# Patient Record
Sex: MALE | Race: BLACK OR AFRICAN AMERICAN | NOT HISPANIC OR LATINO | Employment: UNEMPLOYED | URBAN - NONMETROPOLITAN AREA
[De-identification: names, ages, dates, MRNs, and addresses within clinical notes are randomized per-mention and may not be internally consistent; named-entity substitution may affect disease eponyms.]

---

## 2017-01-03 RX ORDER — TESTOSTERONE CYPIONATE 200 MG/ML
200 INJECTION, SOLUTION INTRAMUSCULAR
Qty: 2 ML | Refills: 5 | Status: SHIPPED | OUTPATIENT
Start: 2017-01-03 | End: 2017-02-16

## 2017-01-03 RX ORDER — LEVOTHYROXINE SODIUM 0.05 MG/1
50 TABLET ORAL DAILY
Qty: 30 TABLET | Refills: 5 | Status: SHIPPED | OUTPATIENT
Start: 2017-01-03 | End: 2017-05-01 | Stop reason: SDUPTHER

## 2017-01-03 RX ORDER — ERGOCALCIFEROL 1.25 MG/1
50000 CAPSULE ORAL 2 TIMES WEEKLY
Qty: 8 CAPSULE | Refills: 5 | Status: SHIPPED | OUTPATIENT
Start: 2017-01-05 | End: 2017-05-01 | Stop reason: SDUPTHER

## 2017-02-16 ENCOUNTER — OFFICE VISIT (OUTPATIENT)
Dept: FAMILY MEDICINE CLINIC | Facility: CLINIC | Age: 42
End: 2017-02-16

## 2017-02-16 VITALS
BODY MASS INDEX: 42.66 KG/M2 | SYSTOLIC BLOOD PRESSURE: 124 MMHG | WEIGHT: 315 LBS | HEIGHT: 72 IN | DIASTOLIC BLOOD PRESSURE: 80 MMHG

## 2017-02-16 DIAGNOSIS — R11.0 NAUSEA: Primary | ICD-10-CM

## 2017-02-16 DIAGNOSIS — I10 ESSENTIAL HYPERTENSION: ICD-10-CM

## 2017-02-16 DIAGNOSIS — F41.1 GENERALIZED ANXIETY DISORDER: ICD-10-CM

## 2017-02-16 DIAGNOSIS — E11.8 TYPE 2 DIABETES MELLITUS WITH COMPLICATION, WITHOUT LONG-TERM CURRENT USE OF INSULIN (HCC): Primary | ICD-10-CM

## 2017-02-16 PROCEDURE — 99214 OFFICE O/P EST MOD 30 MIN: CPT | Performed by: FAMILY MEDICINE

## 2017-02-16 RX ORDER — ALPRAZOLAM 2 MG/1
2 TABLET ORAL 2 TIMES DAILY
Qty: 60 TABLET | Refills: 2 | Status: SHIPPED | OUTPATIENT
Start: 2017-02-16 | End: 2017-06-07 | Stop reason: SDUPTHER

## 2017-02-16 NOTE — PROGRESS NOTES
Subjective   Kuldip Bossman Castaneda is a 41 y.o. male.   He is here today for follow-up on some abdominal pain issues in refills of medicines.  Continues to have a lot of anxiety.  He says that every night he worries that he would not even wake up the following morning.  He recently had a cardiac workup that was negative.  The cardiologist recommended he consider an EGD because of some upper epigastric bloating and occasional pain that he has  sometimes after meals.  He has history of cholecystectomy he just had a colonoscopy about 5 months ago which was normal.  He's had a CT of the abdomen and chest recently which were also normal.    He worries excessively.  He says every night he finds himself getting very stressed out and worried about his health overall and that something might be wrong with him.  History of Present Illness   Anxiety   Presents for follow-up visit. Symptoms include decreased concentration, depressed mood, excessive worry, irritability, muscle tension, nervous/anxious behavior, palpitations, panic and restlessness. Patient reports no chest pain, compulsions, confusion, dizziness, dry mouth, feeling of choking, hyperventilation, impotence, insomnia, malaise, nausea, obsessions, shortness of breath or suicidal ideas. Symptoms occur most days. The severity of symptoms is severe and causing significant distress. The quality of sleep is fair. Nighttime awakenings: occasional.     Compliance with medications is %.     The following portions of the patient's history were reviewed and updated as appropriate: allergies, current medications, past family history, past medical history, past social history, past surgical history and problem list.    Review of Systems   Constitutional: Negative for activity change, appetite change, diaphoresis, fatigue, fever and unexpected weight change.   HENT: Negative for congestion, ear pain, hearing loss, sinus pressure, sore throat, tinnitus, trouble swallowing and  voice change.    Eyes: Negative.    Respiratory: Negative.    Cardiovascular: Negative.    Gastrointestinal: Negative for abdominal distention, abdominal pain, blood in stool, constipation, diarrhea, nausea and vomiting.   Endocrine: Negative.    Genitourinary: Negative for decreased urine volume, dysuria, frequency, hematuria and urgency.   Musculoskeletal: Negative.    Skin: Negative.    Allergic/Immunologic: Negative.    Neurological: Negative for dizziness, tremors, seizures, syncope, speech difficulty, weakness, numbness and headaches.   Hematological: Negative.    Psychiatric/Behavioral: Negative for dysphoric mood and sleep disturbance. The patient is not nervous/anxious.        Objective   Physical Exam   Constitutional: He is oriented to person, place, and time. He appears well-developed and well-nourished. No distress.   HENT:   Head: Normocephalic and atraumatic.   Nose: Nose normal.   Mouth/Throat: Oropharynx is clear and moist.   Eyes: Conjunctivae and EOM are normal. Pupils are equal, round, and reactive to light.   Neck: Normal range of motion. Neck supple. No JVD present. No tracheal deviation present. No thyromegaly present.   Cardiovascular: Normal rate, regular rhythm, normal heart sounds and intact distal pulses.    No murmur heard.  Pulmonary/Chest: Effort normal and breath sounds normal. He has no wheezes. He exhibits no tenderness.   Abdominal: Soft. Bowel sounds are normal. He exhibits no distension and no mass. There is no tenderness.   Musculoskeletal: Normal range of motion. He exhibits no edema or tenderness.   Lymphadenopathy:     He has no cervical adenopathy.   Neurological: He is alert and oriented to person, place, and time. He exhibits normal muscle tone. Coordination normal.   Skin: Skin is warm and dry. No rash noted. No erythema. No pallor.   Psychiatric: He has a normal mood and affect.   Nursing note and vitals reviewed.      Assessment/Plan   Kuldip was seen today for med  refill.    Diagnoses and all orders for this visit:    Nausea  -     Ambulatory Referral to Gastroenterology    Generalized anxiety disorder    Essential hypertension    Other orders  -     ALPRAZolam (XANAX) 2 MG tablet; Take 1 tablet by mouth 2 (Two) Times a Day.    The patient has read and signed the [facility] Controlled Substance Contract.  I will continue to see patient for regular follow up appointments. Patient is well controlled on the medication.  MARY has been reviewed by me and is updated every 3 months. The patient is aware of the potential for addiction and dependence.   Continue with Xanax when needed for panic attacks and encouraged him to use this minimally and only when needed    Discussed with him today all the tests that he's had an reviewed some of the results.  There's been no sign or finding of a life-threatening illness.  I will refer him for EGD to work up the epigastric pain.  I do think some of his symptoms may be anxiety related.    He is going to be due for labs in the near future.  He reports that he is not testing blood sugars anymore because whenever he did that were running around 9200, and the nondiabetic range.  Last A1c is 6.4 and he's off medications for this    His blood pressures a been doing well, will continue lisinopril

## 2017-02-27 ENCOUNTER — LAB (OUTPATIENT)
Dept: LAB | Facility: OTHER | Age: 42
End: 2017-02-27

## 2017-02-27 DIAGNOSIS — R53.83 FATIGUE, UNSPECIFIED TYPE: ICD-10-CM

## 2017-02-27 DIAGNOSIS — I10 ESSENTIAL HYPERTENSION: ICD-10-CM

## 2017-02-27 DIAGNOSIS — E11.8 TYPE 2 DIABETES MELLITUS WITH COMPLICATION, WITHOUT LONG-TERM CURRENT USE OF INSULIN (HCC): ICD-10-CM

## 2017-02-27 DIAGNOSIS — E55.9 VITAMIN D DEFICIENCY: ICD-10-CM

## 2017-02-27 LAB
ALBUMIN SERPL-MCNC: 4.1 G/DL (ref 3.2–5.5)
ALBUMIN/GLOB SERPL: 1.3 G/DL (ref 1–3)
ALP SERPL-CCNC: 42 U/L (ref 15–121)
ALT SERPL W P-5'-P-CCNC: 21 U/L (ref 10–60)
ANION GAP SERPL CALCULATED.3IONS-SCNC: 8 MMOL/L (ref 5–15)
AST SERPL-CCNC: 24 U/L (ref 10–60)
BACTERIA UR QL AUTO: ABNORMAL /HPF
BASOPHILS # BLD AUTO: 0.02 10*3/MM3 (ref 0–0.2)
BASOPHILS NFR BLD AUTO: 0.5 % (ref 0–2)
BILIRUB SERPL-MCNC: 0.7 MG/DL (ref 0.2–1)
BILIRUB UR QL STRIP: NEGATIVE
BUN BLD-MCNC: 14 MG/DL (ref 8–25)
BUN/CREAT SERPL: 12.7 (ref 7–25)
CALCIUM SPEC-SCNC: 9.5 MG/DL (ref 8.4–10.8)
CHLORIDE SERPL-SCNC: 106 MMOL/L (ref 100–112)
CHOLEST SERPL-MCNC: 124 MG/DL (ref 150–200)
CLARITY UR: CLEAR
CO2 SERPL-SCNC: 24 MMOL/L (ref 20–32)
COLOR UR: YELLOW
CREAT BLD-MCNC: 1.1 MG/DL (ref 0.4–1.3)
DEPRECATED RDW RBC AUTO: 44.8 FL (ref 35.1–43.9)
EOSINOPHIL # BLD AUTO: 0.29 10*3/MM3 (ref 0–0.7)
EOSINOPHIL NFR BLD AUTO: 7.2 % (ref 0–7)
ERYTHROCYTE [DISTWIDTH] IN BLOOD BY AUTOMATED COUNT: 14.8 % (ref 11.5–14.5)
GFR SERPL CREATININE-BSD FRML MDRD: 89 ML/MIN/1.73 (ref 63–147)
GLOBULIN UR ELPH-MCNC: 3.2 GM/DL (ref 2.5–4.6)
GLUCOSE BLD-MCNC: 112 MG/DL (ref 70–100)
GLUCOSE UR STRIP-MCNC: NEGATIVE MG/DL
HCT VFR BLD AUTO: 39.3 % (ref 39–49)
HDLC SERPL-MCNC: 31 MG/DL (ref 35–100)
HGB BLD-MCNC: 13.6 G/DL (ref 13.7–17.3)
HGB UR QL STRIP.AUTO: ABNORMAL
HYALINE CASTS UR QL AUTO: ABNORMAL /LPF
KETONES UR QL STRIP: NEGATIVE
LDLC SERPL CALC-MCNC: 81 MG/DL
LDLC/HDLC SERPL: 2.62 {RATIO}
LEUKOCYTE ESTERASE UR QL STRIP.AUTO: NEGATIVE
LYMPHOCYTES # BLD AUTO: 1.76 10*3/MM3 (ref 0.6–4.2)
LYMPHOCYTES NFR BLD AUTO: 43.7 % (ref 10–50)
MCH RBC QN AUTO: 29.1 PG (ref 26.5–34)
MCHC RBC AUTO-ENTMCNC: 34.6 G/DL (ref 31.5–36.3)
MCV RBC AUTO: 84.2 FL (ref 80–98)
MONOCYTES # BLD AUTO: 0.37 10*3/MM3 (ref 0–0.9)
MONOCYTES NFR BLD AUTO: 9.2 % (ref 0–12)
NEUTROPHILS # BLD AUTO: 1.59 10*3/MM3 (ref 2–8.6)
NEUTROPHILS NFR BLD AUTO: 39.4 % (ref 37–80)
NITRITE UR QL STRIP: NEGATIVE
PH UR STRIP.AUTO: 5.5 [PH] (ref 5.5–8)
PLATELET # BLD AUTO: 256 10*3/MM3 (ref 150–450)
PMV BLD AUTO: 9 FL (ref 8–12)
POTASSIUM BLD-SCNC: 4.1 MMOL/L (ref 3.4–5.4)
PROT SERPL-MCNC: 7.3 G/DL (ref 6.7–8.2)
PROT UR QL STRIP: NEGATIVE
RBC # BLD AUTO: 4.67 10*6/MM3 (ref 4.37–5.74)
RBC # UR: ABNORMAL /HPF
REF LAB TEST METHOD: ABNORMAL
SODIUM BLD-SCNC: 138 MMOL/L (ref 134–146)
SP GR UR STRIP: 1.02 (ref 1–1.03)
SQUAMOUS #/AREA URNS HPF: ABNORMAL /HPF
TRIGL SERPL-MCNC: 59 MG/DL (ref 35–160)
UROBILINOGEN UR QL STRIP: ABNORMAL
VLDLC SERPL-MCNC: 11.8 MG/DL
WBC NRBC COR # BLD: 4.03 10*3/MM3 (ref 3.2–9.8)
WBC UR QL AUTO: ABNORMAL /HPF

## 2017-02-27 PROCEDURE — 83036 HEMOGLOBIN GLYCOSYLATED A1C: CPT | Performed by: FAMILY MEDICINE

## 2017-02-27 PROCEDURE — 80053 COMPREHEN METABOLIC PANEL: CPT | Performed by: NURSE PRACTITIONER

## 2017-02-27 PROCEDURE — 36415 COLL VENOUS BLD VENIPUNCTURE: CPT | Performed by: NURSE PRACTITIONER

## 2017-02-27 PROCEDURE — 84443 ASSAY THYROID STIM HORMONE: CPT | Performed by: NURSE PRACTITIONER

## 2017-02-27 PROCEDURE — 80061 LIPID PANEL: CPT | Performed by: FAMILY MEDICINE

## 2017-02-27 PROCEDURE — 81001 URINALYSIS AUTO W/SCOPE: CPT | Performed by: NURSE PRACTITIONER

## 2017-02-27 PROCEDURE — 82607 VITAMIN B-12: CPT | Performed by: NURSE PRACTITIONER

## 2017-02-27 PROCEDURE — 84402 ASSAY OF FREE TESTOSTERONE: CPT | Performed by: NURSE PRACTITIONER

## 2017-02-27 PROCEDURE — 85025 COMPLETE CBC W/AUTO DIFF WBC: CPT | Performed by: NURSE PRACTITIONER

## 2017-02-27 PROCEDURE — 84403 ASSAY OF TOTAL TESTOSTERONE: CPT | Performed by: NURSE PRACTITIONER

## 2017-02-27 PROCEDURE — 82306 VITAMIN D 25 HYDROXY: CPT | Performed by: NURSE PRACTITIONER

## 2017-02-28 LAB
25(OH)D3 SERPL-MCNC: 39.9 NG/ML (ref 30–100)
HBA1C MFR BLD: 6.78 % (ref 4–5.6)
TSH SERPL DL<=0.05 MIU/L-ACNC: 2.43 MIU/ML (ref 0.46–4.68)
VIT B12 BLD-MCNC: 842 PG/ML (ref 239–931)

## 2017-03-02 LAB
CONV COMMENT: ABNORMAL
TESTOST FREE SERPL-MCNC: 11.6 PG/ML (ref 6.8–21.5)
TESTOST SERPL-MCNC: 328 NG/DL (ref 348–1197)

## 2017-03-06 ENCOUNTER — OFFICE VISIT (OUTPATIENT)
Dept: OPHTHALMOLOGY | Facility: CLINIC | Age: 42
End: 2017-03-06

## 2017-03-06 DIAGNOSIS — E11.9 DIABETES MELLITUS WITHOUT COMPLICATION (HCC): ICD-10-CM

## 2017-03-06 DIAGNOSIS — H40.003 BORDERLINE GLAUCOMA, BILATERAL: Primary | ICD-10-CM

## 2017-03-06 PROCEDURE — 92014 COMPRE OPH EXAM EST PT 1/>: CPT | Performed by: OPHTHALMOLOGY

## 2017-03-06 PROCEDURE — 92133 CPTRZD OPH DX IMG PST SGM ON: CPT | Performed by: OPHTHALMOLOGY

## 2017-03-06 PROCEDURE — 76514 ECHO EXAM OF EYE THICKNESS: CPT | Performed by: OPHTHALMOLOGY

## 2017-03-06 NOTE — PROGRESS NOTES
Subjective   Kuldip Castaneda is a 41 y.o. male.   Chief Complaint   Patient presents with   • Diabetic Eye Exam   • borderline glaucoma     HPI     Diabetic Eye Exam   Associated symptoms: Negative for blurred vision   Diabetes Type: Type 2 and controlled with diet   Duration: years   Blood Sugars: is controlled       Last edited by Christos Richardson MD on 3/6/2017 11:33 AM. (History)          Review of Systems    Objective   Visual Acuity (Snellen - Linear)      Right Left   Dist sc 20/30 +2 20/25   Near sc J1+ J1+                  Pupils      Pupils   Right PERRL   Left PERRL           Confrontational Visual Fields     Visual Fields      Left Right   Result Full Full                  Extraocular Movement      Right Left   Result Full Full              Tonometry (Applanation, 11:35 AM)      Right Left   Pressure 19 19              Main Ophthalmology Exam     External Exam      Right Left    External Normal Normal      Slit Lamp Exam      Right Left    Lids/Lashes Normal Normal    Conjunctiva/Sclera White and quiet White and quiet    Cornea Clear Clear    Anterior Chamber Deep and quiet Deep and quiet    Iris Round and reactive Round and reactive    Lens Clear Clear    Vitreous Normal Normal      Fundus Exam      Right Left    Disc Thin rim 0.15 ST, IT Thin rim 0.15 St, IT    Macula Normal Normal    Vessels Normal Normal    Periphery Normal Normal            OCT Disc:   OD- relative superior thinning and not progressed  OS- relative superior thinning and not progressed    Assessment/Plan   Diagnoses and all orders for this visit:    Borderline glaucoma, bilateral    Diabetes mellitus without complication    Eye disease risks with diabetes discussed         Return in about 1 year (around 3/6/2018) for Visual Field 24-2.

## 2017-03-13 ENCOUNTER — OFFICE VISIT (OUTPATIENT)
Dept: FAMILY MEDICINE CLINIC | Facility: CLINIC | Age: 42
End: 2017-03-13

## 2017-03-13 VITALS
BODY MASS INDEX: 42.66 KG/M2 | SYSTOLIC BLOOD PRESSURE: 162 MMHG | HEIGHT: 72 IN | WEIGHT: 315 LBS | DIASTOLIC BLOOD PRESSURE: 122 MMHG | HEART RATE: 65 BPM

## 2017-03-13 DIAGNOSIS — J06.9 ACUTE URI: Primary | ICD-10-CM

## 2017-03-13 PROCEDURE — 99213 OFFICE O/P EST LOW 20 MIN: CPT | Performed by: FAMILY MEDICINE

## 2017-03-13 RX ORDER — AZITHROMYCIN 250 MG/1
TABLET, FILM COATED ORAL
Qty: 6 TABLET | Refills: 0 | Status: SHIPPED | OUTPATIENT
Start: 2017-03-13 | End: 2017-03-18

## 2017-03-13 RX ORDER — AZITHROMYCIN 250 MG/1
TABLET, FILM COATED ORAL
Qty: 6 TABLET | Refills: 0 | Status: SHIPPED | OUTPATIENT
Start: 2017-03-13 | End: 2017-03-13 | Stop reason: SDUPTHER

## 2017-03-13 NOTE — PROGRESS NOTES
Subjective   Kuldip Castaneda is a 41 y.o. male.  He's here today with a weeklong history of productive cough congestion and drainage.    History of Present Illness   URI    This is a new problem. The current episode started in the past 7 days. The problem has been unchanged. There has been no fever. Associated symptoms include congestion, coughing, ear pain, headaches, a plugged ear sensation, rhinorrhea, sinus pain, sneezing and a sore throat. Pertinent negatives include no abdominal pain, chest pain, diarrhea, dysuria, joint pain, joint swelling, nausea, neck pain, rash, vomiting or wheezing.  he has tried, antihistamine and acetaminophen for the symptoms. The treatment provided mild relief.       The following portions of the patient's history were reviewed and updated as appropriate: allergies, current medications, past family history, past medical history, past social history, past surgical history and problem list.    Review of Systems  Review of Systems   Constitutional: Negative.    HENT: Positive for congestion, ear pain, rhinorrhea, sneezing and sore throat.    Respiratory: Positive for cough. Negative for shortness of breath and wheezing.    Cardiovascular: Negative.  Negative for chest pain.   Gastrointestinal: Negative.  Negative for abdominal pain, diarrhea, nausea and vomiting.   Genitourinary: Negative for dysuria.   Musculoskeletal: Negative.  Negative for joint pain, myalgias and neck pain.   Skin: Negative.  Negative for rash.   Allergic/Immunologic: Negative for immunocompromised state.   Neurological: Positive for headaches. Negative for dizziness, tremors, seizures, syncope, weakness and numbness.   Hematological: Negative.    Psychiatric/Behavioral: Negative for agitation, confusion, dysphoric mood and sleep disturbance. The patient is not nervous/anxious.      Objective   Physical Exam  Physical Exam   Constitutional: She is oriented to person, place, and time. She appears well-developed  and well-nourished.   HENT:   Head: Normocephalic and atraumatic.   Nose: Nose normal.   Mouth/Throat: Oropharynx is clear and moist.   Posterior pharynx shows lymphoid hyperplasia and mild erythema.  Tympanic membranes are retracted bilaterally.  There is mild tenderness over the maxillary sinuses bilaterally  tonsils are enlarged bilaterally  Eyes: Conjunctivae and EOM are normal. Pupils are equal, round, and reactive to light.   Neck: Normal range of motion. Neck supple. No JVD present. No tracheal deviation present. No thyromegaly present.   Cardiovascular: Normal rate, regular rhythm, normal heart sounds and intact distal pulses.    No murmur heard.  Pulmonary/Chest: Effort normal and breath sounds normal. She has no wheezes.   Abdominal: Soft. Bowel sounds are normal. She exhibits no distension. There is no tenderness.   Musculoskeletal: Normal range of motion. She exhibits no edema.   Lymphadenopathy:     She has no cervical adenopathy.   Neurological: She is alert and oriented to person, place, and time. Coordination normal.   Skin: Skin is warm and dry. No rash noted.   Psychiatric: She has a normal mood and affect.   Nursing note and vitals reviewed.    Assessment/Plan   Kuldip was seen today for cough.    Diagnoses and all orders for this visit:    Acute URI    Other orders  -     Discontinue: azithromycin (ZITHROMAX) 250 MG tablet; Take 2 tablets by mouth the first day, then 1 tablet daily for the next 4 days.  -     azithromycin (ZITHROMAX) 250 MG tablet; Take 2 tablets by mouth the first day, then 1 tablet daily for the next 4 days.   Zithromax is given for upper respiratory infection symptoms.  He is advised to use Mucinex, discontinue antihistamines for now.  Contact me if not improving within a week    Regarding the enlarged tonsils and snoring, once the infection clears he may want to consider sleep study or ENT referral.  Discussed sleep apnea with patient and his wife today.  They will consider  referral and let me know

## 2017-04-25 ENCOUNTER — OFFICE VISIT (OUTPATIENT)
Dept: GASTROENTEROLOGY | Facility: CLINIC | Age: 42
End: 2017-04-25

## 2017-04-25 VITALS
DIASTOLIC BLOOD PRESSURE: 82 MMHG | SYSTOLIC BLOOD PRESSURE: 130 MMHG | WEIGHT: 315 LBS | HEART RATE: 66 BPM | HEIGHT: 72 IN | BODY MASS INDEX: 42.66 KG/M2

## 2017-04-25 DIAGNOSIS — R10.13 EPIGASTRIC PAIN: Primary | ICD-10-CM

## 2017-04-25 PROCEDURE — 99214 OFFICE O/P EST MOD 30 MIN: CPT | Performed by: INTERNAL MEDICINE

## 2017-04-25 RX ORDER — DEXTROSE AND SODIUM CHLORIDE 5; .45 G/100ML; G/100ML
30 INJECTION, SOLUTION INTRAVENOUS CONTINUOUS PRN
Status: CANCELLED | OUTPATIENT
Start: 2017-05-24

## 2017-04-25 RX ORDER — CYANOCOBALAMIN 1000 UG/ML
INJECTION, SOLUTION INTRAMUSCULAR; SUBCUTANEOUS
Qty: 2 ML | Refills: 3 | Status: SHIPPED | OUTPATIENT
Start: 2017-04-25 | End: 2017-08-16

## 2017-04-25 RX ORDER — SODIUM CHLORIDE 0.9 % (FLUSH) 0.9 %
1-10 SYRINGE (ML) INJECTION AS NEEDED
Status: CANCELLED | OUTPATIENT
Start: 2017-05-24
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
## 2017-04-26 ENCOUNTER — APPOINTMENT (OUTPATIENT)
Dept: GENERAL RADIOLOGY | Facility: HOSPITAL | Age: 42
End: 2017-04-26

## 2017-04-26 ENCOUNTER — HOSPITAL ENCOUNTER (EMERGENCY)
Facility: HOSPITAL | Age: 42
Discharge: HOME | End: 2017-04-26
Attending: EMERGENCY MEDICINE | Admitting: EMERGENCY MEDICINE

## 2017-04-26 VITALS
DIASTOLIC BLOOD PRESSURE: 107 MMHG | RESPIRATION RATE: 18 BRPM | SYSTOLIC BLOOD PRESSURE: 163 MMHG | OXYGEN SATURATION: 100 % | TEMPERATURE: 98 F | WEIGHT: 308.25 LBS | HEIGHT: 72 IN | HEART RATE: 53 BPM | BODY MASS INDEX: 41.75 KG/M2

## 2017-04-26 DIAGNOSIS — S39.012A LUMBAR STRAIN, INITIAL ENCOUNTER: Primary | ICD-10-CM

## 2017-04-26 DIAGNOSIS — M51.36 DEGENERATIVE DISC DISEASE, LUMBAR: ICD-10-CM

## 2017-04-26 DIAGNOSIS — I10 HYPERTENSION, UNCONTROLLED: ICD-10-CM

## 2017-04-26 LAB
ALBUMIN SERPL-MCNC: 4.3 G/DL (ref 3.4–4.8)
ALBUMIN/GLOB SERPL: 1.4 G/DL (ref 1.1–1.8)
ALP SERPL-CCNC: 49 U/L (ref 38–126)
ALT SERPL W P-5'-P-CCNC: 27 U/L (ref 21–72)
ANION GAP SERPL CALCULATED.3IONS-SCNC: 12 MMOL/L (ref 5–15)
AST SERPL-CCNC: 30 U/L (ref 17–59)
BASOPHILS # BLD AUTO: 0.02 10*3/MM3 (ref 0–0.2)
BASOPHILS NFR BLD AUTO: 0.4 % (ref 0–2)
BILIRUB SERPL-MCNC: 0.5 MG/DL (ref 0.2–1.3)
BILIRUB UR QL STRIP: NEGATIVE
BUN BLD-MCNC: 14 MG/DL (ref 7–21)
BUN/CREAT SERPL: 13.1 (ref 7–25)
CALCIUM SPEC-SCNC: 9.2 MG/DL (ref 8.4–10.2)
CHLORIDE SERPL-SCNC: 99 MMOL/L (ref 95–110)
CLARITY UR: CLEAR
CO2 SERPL-SCNC: 26 MMOL/L (ref 22–31)
COLOR UR: YELLOW
CREAT BLD-MCNC: 1.07 MG/DL (ref 0.7–1.3)
DEPRECATED RDW RBC AUTO: 44.4 FL (ref 35.1–43.9)
EOSINOPHIL # BLD AUTO: 0.31 10*3/MM3 (ref 0–0.7)
EOSINOPHIL NFR BLD AUTO: 6.7 % (ref 0–7)
ERYTHROCYTE [DISTWIDTH] IN BLOOD BY AUTOMATED COUNT: 14.5 % (ref 11.5–14.5)
GFR SERPL CREATININE-BSD FRML MDRD: 92 ML/MIN/1.73 (ref 63–147)
GLOBULIN UR ELPH-MCNC: 3 GM/DL (ref 2.3–3.5)
GLUCOSE BLD-MCNC: 83 MG/DL (ref 60–100)
GLUCOSE UR STRIP-MCNC: NEGATIVE MG/DL
HCT VFR BLD AUTO: 40.9 % (ref 39–49)
HGB BLD-MCNC: 14.3 G/DL (ref 13.7–17.3)
HGB UR QL STRIP.AUTO: NEGATIVE
HOLD SPECIMEN: NORMAL
HOLD SPECIMEN: NORMAL
IMM GRANULOCYTES # BLD: 0.01 10*3/MM3 (ref 0–0.02)
IMM GRANULOCYTES NFR BLD: 0.2 % (ref 0–0.5)
KETONES UR QL STRIP: NEGATIVE
LEUKOCYTE ESTERASE UR QL STRIP.AUTO: NEGATIVE
LYMPHOCYTES # BLD AUTO: 2.57 10*3/MM3 (ref 0.6–4.2)
LYMPHOCYTES NFR BLD AUTO: 55.9 % (ref 10–50)
MCH RBC QN AUTO: 29.2 PG (ref 26.5–34)
MCHC RBC AUTO-ENTMCNC: 35 G/DL (ref 31.5–36.3)
MCV RBC AUTO: 83.5 FL (ref 80–98)
MONOCYTES # BLD AUTO: 0.37 10*3/MM3 (ref 0–0.9)
MONOCYTES NFR BLD AUTO: 8 % (ref 0–12)
NEUTROPHILS # BLD AUTO: 1.32 10*3/MM3 (ref 2–8.6)
NEUTROPHILS NFR BLD AUTO: 28.8 % (ref 37–80)
NITRITE UR QL STRIP: NEGATIVE
PH UR STRIP.AUTO: 6 [PH] (ref 5–9)
PLATELET # BLD AUTO: 246 10*3/MM3 (ref 150–450)
PMV BLD AUTO: 9.1 FL (ref 8–12)
POTASSIUM BLD-SCNC: 3.6 MMOL/L (ref 3.5–5.1)
PROT SERPL-MCNC: 7.3 G/DL (ref 6.3–8.6)
PROT UR QL STRIP: NEGATIVE
RBC # BLD AUTO: 4.9 10*6/MM3 (ref 4.37–5.74)
SODIUM BLD-SCNC: 137 MMOL/L (ref 137–145)
SP GR UR STRIP: 1.02 (ref 1–1.03)
UROBILINOGEN UR QL STRIP: NORMAL
WBC NRBC COR # BLD: 4.6 10*3/MM3 (ref 3.2–9.8)
WHOLE BLOOD HOLD SPECIMEN: NORMAL
WHOLE BLOOD HOLD SPECIMEN: NORMAL

## 2017-04-26 PROCEDURE — 96374 THER/PROPH/DIAG INJ IV PUSH: CPT

## 2017-04-26 PROCEDURE — 25010000002 KETOROLAC TROMETHAMINE PER 15 MG: Performed by: EMERGENCY MEDICINE

## 2017-04-26 PROCEDURE — 85025 COMPLETE CBC W/AUTO DIFF WBC: CPT | Performed by: EMERGENCY MEDICINE

## 2017-04-26 PROCEDURE — 81003 URINALYSIS AUTO W/O SCOPE: CPT | Performed by: EMERGENCY MEDICINE

## 2017-04-26 PROCEDURE — 80053 COMPREHEN METABOLIC PANEL: CPT | Performed by: EMERGENCY MEDICINE

## 2017-04-26 PROCEDURE — 72100 X-RAY EXAM L-S SPINE 2/3 VWS: CPT

## 2017-04-26 PROCEDURE — 99284 EMERGENCY DEPT VISIT MOD MDM: CPT

## 2017-04-26 RX ORDER — KETOROLAC TROMETHAMINE 30 MG/ML
60 INJECTION, SOLUTION INTRAMUSCULAR; INTRAVENOUS ONCE
Status: DISCONTINUED | OUTPATIENT
Start: 2017-04-26 | End: 2017-04-26

## 2017-04-26 RX ORDER — METHOCARBAMOL 750 MG/1
750 TABLET, FILM COATED ORAL 4 TIMES DAILY PRN
Qty: 30 TABLET | Refills: 0 | Status: SHIPPED | OUTPATIENT
Start: 2017-04-26 | End: 2017-06-07

## 2017-04-26 RX ORDER — SODIUM CHLORIDE 0.9 % (FLUSH) 0.9 %
10 SYRINGE (ML) INJECTION AS NEEDED
Status: DISCONTINUED | OUTPATIENT
Start: 2017-04-26 | End: 2017-04-26 | Stop reason: HOSPADM

## 2017-04-26 RX ORDER — KETOROLAC TROMETHAMINE 30 MG/ML
30 INJECTION, SOLUTION INTRAMUSCULAR; INTRAVENOUS ONCE
Status: COMPLETED | OUTPATIENT
Start: 2017-04-26 | End: 2017-04-26

## 2017-04-26 RX ORDER — CLONIDINE HYDROCHLORIDE 0.2 MG/1
0.2 TABLET ORAL ONCE
Status: COMPLETED | OUTPATIENT
Start: 2017-04-26 | End: 2017-04-26

## 2017-04-26 RX ADMIN — CLONIDINE HYDROCHLORIDE 0.2 MG: 0.2 TABLET ORAL at 21:12

## 2017-04-26 RX ADMIN — Medication 10 ML: at 20:12

## 2017-04-26 RX ADMIN — KETOROLAC TROMETHAMINE 30 MG: 30 INJECTION, SOLUTION INTRAMUSCULAR; INTRAVENOUS at 20:11

## 2017-05-01 ENCOUNTER — OFFICE VISIT (OUTPATIENT)
Dept: FAMILY MEDICINE CLINIC | Facility: CLINIC | Age: 42
End: 2017-05-01

## 2017-05-01 VITALS
WEIGHT: 306 LBS | SYSTOLIC BLOOD PRESSURE: 130 MMHG | DIASTOLIC BLOOD PRESSURE: 110 MMHG | BODY MASS INDEX: 41.45 KG/M2 | HEART RATE: 73 BPM | HEIGHT: 72 IN | OXYGEN SATURATION: 97 % | TEMPERATURE: 96 F

## 2017-05-01 DIAGNOSIS — M51.37 DDD (DEGENERATIVE DISC DISEASE), LUMBOSACRAL: Primary | Chronic | ICD-10-CM

## 2017-05-01 DIAGNOSIS — D17.0 LIPOMA OF NECK: ICD-10-CM

## 2017-05-01 PROCEDURE — 99214 OFFICE O/P EST MOD 30 MIN: CPT | Performed by: NURSE PRACTITIONER

## 2017-05-03 ENCOUNTER — HOSPITAL ENCOUNTER (OUTPATIENT)
Dept: CT IMAGING | Facility: HOSPITAL | Age: 42
Discharge: HOME | End: 2017-05-03
Attending: INTERNAL MEDICINE | Admitting: INTERNAL MEDICINE

## 2017-05-03 DIAGNOSIS — R10.13 EPIGASTRIC PAIN: ICD-10-CM

## 2017-05-03 PROCEDURE — 0 IOPAMIDOL 61 % SOLUTION: Performed by: INTERNAL MEDICINE

## 2017-05-03 PROCEDURE — 74177 CT ABD & PELVIS W/CONTRAST: CPT

## 2017-05-03 RX ADMIN — IOPAMIDOL 94 ML: 612 INJECTION, SOLUTION INTRAVENOUS at 11:30

## 2017-05-04 PROBLEM — D17.0 LIPOMA OF NECK: Status: ACTIVE | Noted: 2017-05-04

## 2017-05-04 PROBLEM — M51.37 DDD (DEGENERATIVE DISC DISEASE), LUMBOSACRAL: Chronic | Status: ACTIVE | Noted: 2017-05-04

## 2017-05-04 RX ORDER — METHYLPREDNISOLONE 4 MG/1
TABLET ORAL
Qty: 21 TABLET | Refills: 0 | Status: SHIPPED | OUTPATIENT
Start: 2017-05-04 | End: 2017-05-17

## 2017-05-04 RX ORDER — TIZANIDINE 4 MG/1
4 TABLET ORAL NIGHTLY PRN
Qty: 30 TABLET | Refills: 0 | Status: SHIPPED | OUTPATIENT
Start: 2017-05-04 | End: 2017-05-17 | Stop reason: SDDI

## 2017-05-04 RX ORDER — LIDOCAINE 50 MG/G
OINTMENT TOPICAL
Qty: 50 G | Refills: 0 | Status: SHIPPED | OUTPATIENT
Start: 2017-05-04 | End: 2017-05-17 | Stop reason: SDDI

## 2017-05-08 ENCOUNTER — OFFICE VISIT (OUTPATIENT)
Dept: ENDOCRINOLOGY | Facility: CLINIC | Age: 42
End: 2017-05-08

## 2017-05-08 VITALS
SYSTOLIC BLOOD PRESSURE: 140 MMHG | DIASTOLIC BLOOD PRESSURE: 84 MMHG | HEIGHT: 70 IN | HEART RATE: 72 BPM | WEIGHT: 297.2 LBS | BODY MASS INDEX: 42.55 KG/M2

## 2017-05-08 DIAGNOSIS — E03.8 HYPOTHYROIDISM DUE TO HASHIMOTO'S THYROIDITIS: ICD-10-CM

## 2017-05-08 DIAGNOSIS — E78.2 MIXED HYPERLIPIDEMIA: ICD-10-CM

## 2017-05-08 DIAGNOSIS — I10 ESSENTIAL HYPERTENSION: ICD-10-CM

## 2017-05-08 DIAGNOSIS — E55.9 VITAMIN D DEFICIENCY: ICD-10-CM

## 2017-05-08 DIAGNOSIS — E11.9 TYPE 2 DIABETES MELLITUS WITHOUT COMPLICATION, WITHOUT LONG-TERM CURRENT USE OF INSULIN (HCC): Primary | ICD-10-CM

## 2017-05-08 DIAGNOSIS — E29.1 DEFICIENCY OF TESTOSTERONE BIOSYNTHESIS: Chronic | ICD-10-CM

## 2017-05-08 DIAGNOSIS — E53.8 B12 DEFICIENCY: ICD-10-CM

## 2017-05-08 DIAGNOSIS — E06.3 HYPOTHYROIDISM DUE TO HASHIMOTO'S THYROIDITIS: ICD-10-CM

## 2017-05-08 PROCEDURE — 99214 OFFICE O/P EST MOD 30 MIN: CPT | Performed by: INTERNAL MEDICINE

## 2017-05-08 RX ORDER — TESTOSTERONE CYPIONATE 200 MG/ML
INJECTION, SOLUTION INTRAMUSCULAR
Qty: 3 ML | Refills: 5 | Status: SHIPPED | OUTPATIENT
Start: 2017-05-08 | End: 2017-10-10 | Stop reason: SDUPTHER

## 2017-05-08 RX ORDER — SULFAMETHOXAZOLE AND TRIMETHOPRIM 800; 160 MG/1; MG/1
1 TABLET ORAL 2 TIMES DAILY
Qty: 6 TABLET | Refills: 0 | Status: SHIPPED | OUTPATIENT
Start: 2017-05-08 | End: 2017-05-08 | Stop reason: SDUPTHER

## 2017-05-08 RX ORDER — SULFAMETHOXAZOLE AND TRIMETHOPRIM 800; 160 MG/1; MG/1
1 TABLET ORAL 2 TIMES DAILY
Qty: 6 TABLET | Refills: 0 | Status: SHIPPED | OUTPATIENT
Start: 2017-05-08 | End: 2017-05-11

## 2017-05-12 ENCOUNTER — HOSPITAL ENCOUNTER (OUTPATIENT)
Dept: MRI IMAGING | Facility: HOSPITAL | Age: 42
Discharge: HOME | End: 2017-05-12
Admitting: NURSE PRACTITIONER

## 2017-05-12 PROCEDURE — 72148 MRI LUMBAR SPINE W/O DYE: CPT

## 2017-05-15 DIAGNOSIS — R93.7 ABNORMAL MRI, LUMBAR SPINE: Primary | ICD-10-CM

## 2017-05-17 ENCOUNTER — CONSULT (OUTPATIENT)
Dept: PHYSICAL THERAPY | Facility: CLINIC | Age: 42
End: 2017-05-17

## 2017-05-17 DIAGNOSIS — R93.7 ABNORMAL MRI, LUMBAR SPINE: Primary | ICD-10-CM

## 2017-05-17 PROCEDURE — 97162 PT EVAL MOD COMPLEX 30 MIN: CPT | Performed by: PHYSICAL THERAPIST

## 2017-05-24 ENCOUNTER — ANESTHESIA EVENT (OUTPATIENT)
Dept: GASTROENTEROLOGY | Facility: HOSPITAL | Age: 42
End: 2017-05-24

## 2017-05-24 ENCOUNTER — ANESTHESIA (OUTPATIENT)
Dept: GASTROENTEROLOGY | Facility: HOSPITAL | Age: 42
End: 2017-05-24

## 2017-05-24 ENCOUNTER — HOSPITAL ENCOUNTER (OUTPATIENT)
Facility: HOSPITAL | Age: 42
Setting detail: HOSPITAL OUTPATIENT SURGERY
Discharge: HOME | End: 2017-05-24
Attending: INTERNAL MEDICINE | Admitting: INTERNAL MEDICINE

## 2017-05-24 VITALS
OXYGEN SATURATION: 97 % | SYSTOLIC BLOOD PRESSURE: 161 MMHG | HEART RATE: 59 BPM | BODY MASS INDEX: 39.15 KG/M2 | DIASTOLIC BLOOD PRESSURE: 78 MMHG | TEMPERATURE: 97 F | WEIGHT: 289.02 LBS | HEIGHT: 72 IN | RESPIRATION RATE: 18 BRPM

## 2017-05-24 DIAGNOSIS — R10.13 EPIGASTRIC PAIN: ICD-10-CM

## 2017-05-24 PROCEDURE — 88313 SPECIAL STAINS GROUP 2: CPT | Performed by: INTERNAL MEDICINE

## 2017-05-24 PROCEDURE — 88313 SPECIAL STAINS GROUP 2: CPT | Performed by: PATHOLOGY

## 2017-05-24 PROCEDURE — 43239 EGD BIOPSY SINGLE/MULTIPLE: CPT | Performed by: INTERNAL MEDICINE

## 2017-05-24 PROCEDURE — 25010000002 PROPOFOL 10 MG/ML EMULSION: Performed by: NURSE ANESTHETIST, CERTIFIED REGISTERED

## 2017-05-24 PROCEDURE — 88305 TISSUE EXAM BY PATHOLOGIST: CPT | Performed by: PATHOLOGY

## 2017-05-24 PROCEDURE — 88305 TISSUE EXAM BY PATHOLOGIST: CPT | Performed by: INTERNAL MEDICINE

## 2017-05-24 RX ORDER — LIDOCAINE HYDROCHLORIDE 10 MG/ML
INJECTION, SOLUTION INFILTRATION; PERINEURAL AS NEEDED
Status: DISCONTINUED | OUTPATIENT
Start: 2017-05-24 | End: 2017-05-24 | Stop reason: SURG

## 2017-05-24 RX ORDER — ONDANSETRON 2 MG/ML
4 INJECTION INTRAMUSCULAR; INTRAVENOUS ONCE AS NEEDED
Status: DISCONTINUED | OUTPATIENT
Start: 2017-05-24 | End: 2017-05-24 | Stop reason: HOSPADM

## 2017-05-24 RX ORDER — PROMETHAZINE HYDROCHLORIDE 25 MG/1
25 SUPPOSITORY RECTAL ONCE AS NEEDED
Status: DISCONTINUED | OUTPATIENT
Start: 2017-05-24 | End: 2017-05-24 | Stop reason: HOSPADM

## 2017-05-24 RX ORDER — PROMETHAZINE HYDROCHLORIDE 25 MG/1
25 TABLET ORAL ONCE AS NEEDED
Status: DISCONTINUED | OUTPATIENT
Start: 2017-05-24 | End: 2017-05-24 | Stop reason: HOSPADM

## 2017-05-24 RX ORDER — DEXAMETHASONE SODIUM PHOSPHATE 4 MG/ML
8 INJECTION, SOLUTION INTRA-ARTICULAR; INTRALESIONAL; INTRAMUSCULAR; INTRAVENOUS; SOFT TISSUE ONCE AS NEEDED
Status: DISCONTINUED | OUTPATIENT
Start: 2017-05-24 | End: 2017-05-24 | Stop reason: HOSPADM

## 2017-05-24 RX ORDER — PROMETHAZINE HYDROCHLORIDE 25 MG/ML
12.5 INJECTION, SOLUTION INTRAMUSCULAR; INTRAVENOUS ONCE AS NEEDED
Status: DISCONTINUED | OUTPATIENT
Start: 2017-05-24 | End: 2017-05-24 | Stop reason: HOSPADM

## 2017-05-24 RX ORDER — DEXTROSE AND SODIUM CHLORIDE 5; .45 G/100ML; G/100ML
30 INJECTION, SOLUTION INTRAVENOUS CONTINUOUS PRN
Status: DISCONTINUED | OUTPATIENT
Start: 2017-05-24 | End: 2017-05-24 | Stop reason: HOSPADM

## 2017-05-24 RX ORDER — PROPOFOL 10 MG/ML
VIAL (ML) INTRAVENOUS AS NEEDED
Status: DISCONTINUED | OUTPATIENT
Start: 2017-05-24 | End: 2017-05-24 | Stop reason: SURG

## 2017-05-24 RX ADMIN — DEXTROSE AND SODIUM CHLORIDE 30 ML/HR: 5; 450 INJECTION, SOLUTION INTRAVENOUS at 15:22

## 2017-05-24 RX ADMIN — PROPOFOL 100 MG: 10 INJECTION, EMULSION INTRAVENOUS at 16:04

## 2017-05-24 RX ADMIN — DEXTROSE AND SODIUM CHLORIDE: 5; 450 INJECTION, SOLUTION INTRAVENOUS at 16:01

## 2017-05-24 RX ADMIN — PROPOFOL 100 MG: 10 INJECTION, EMULSION INTRAVENOUS at 16:15

## 2017-05-24 RX ADMIN — PROPOFOL 100 MG: 10 INJECTION, EMULSION INTRAVENOUS at 16:08

## 2017-05-24 RX ADMIN — DEXTROSE AND SODIUM CHLORIDE 30 ML/HR: 5; 450 INJECTION, SOLUTION INTRAVENOUS at 16:26

## 2017-05-24 RX ADMIN — LIDOCAINE HYDROCHLORIDE 100 MG: 10 INJECTION, SOLUTION INFILTRATION; PERINEURAL at 16:15

## 2017-05-24 RX ADMIN — LIDOCAINE HYDROCHLORIDE 100 MG: 10 INJECTION, SOLUTION INFILTRATION; PERINEURAL at 16:04

## 2017-05-26 LAB
LAB AP CASE REPORT: NORMAL
Lab: NORMAL
PATH REPORT.FINAL DX SPEC: NORMAL
PATH REPORT.GROSS SPEC: NORMAL

## 2017-05-31 ENCOUNTER — TREATMENT (OUTPATIENT)
Dept: PHYSICAL THERAPY | Facility: CLINIC | Age: 42
End: 2017-05-31

## 2017-05-31 DIAGNOSIS — R93.7 ABNORMAL MRI, LUMBAR SPINE: Primary | ICD-10-CM

## 2017-05-31 PROCEDURE — A9999 DME SUPPLY OR ACCESSORY, NOS: HCPCS | Performed by: PHYSICAL THERAPIST

## 2017-05-31 PROCEDURE — 97110 THERAPEUTIC EXERCISES: CPT | Performed by: PHYSICAL THERAPIST

## 2017-06-07 ENCOUNTER — OFFICE VISIT (OUTPATIENT)
Dept: FAMILY MEDICINE CLINIC | Facility: CLINIC | Age: 42
End: 2017-06-07

## 2017-06-07 VITALS
BODY MASS INDEX: 39.06 KG/M2 | HEIGHT: 72 IN | SYSTOLIC BLOOD PRESSURE: 156 MMHG | WEIGHT: 288.4 LBS | DIASTOLIC BLOOD PRESSURE: 94 MMHG

## 2017-06-07 DIAGNOSIS — R22.2 ABDOMINAL WALL MASS: Primary | ICD-10-CM

## 2017-06-07 DIAGNOSIS — F41.1 GENERALIZED ANXIETY DISORDER: ICD-10-CM

## 2017-06-07 PROCEDURE — 99214 OFFICE O/P EST MOD 30 MIN: CPT | Performed by: FAMILY MEDICINE

## 2017-06-07 RX ORDER — ALPRAZOLAM 2 MG/1
2 TABLET ORAL 2 TIMES DAILY
Qty: 60 TABLET | Refills: 2 | Status: SHIPPED | OUTPATIENT
Start: 2017-06-07 | End: 2017-10-04 | Stop reason: HOSPADM

## 2017-06-07 RX ORDER — ALPRAZOLAM 2 MG/1
2 TABLET ORAL 2 TIMES DAILY
Qty: 60 TABLET | Refills: 2 | Status: SHIPPED | OUTPATIENT
Start: 2017-06-07 | End: 2017-06-07 | Stop reason: SDUPTHER

## 2017-06-07 NOTE — PROGRESS NOTES
Subjective   Kuldip Castaneda is a 41 y.o. male who presents to the office for follow-up after workup for some abdominal pain.  He had an EGD showing moderately severe esophagitis.  Evidently a colonoscopy had been discussed but wasn't done.  He had a CT of the abdomen which was read as normal.  He is having a pain and a firm area in the left lower abdominal wall.  He's concerned that he was bleeding here.  Nothing was seen on the CT in this region.  Needs refill of medication for anxiety.  He takes this only as needed usually at night.  History of Present Illness   Anxiety   Presents for follow-up visit. Symptoms include decreased concentration, depressed mood, excessive worry, irritability, muscle tension, nervous/anxious behavior, palpitations, panic and restlessness. Patient reports no chest pain, compulsions, confusion, dizziness, dry mouth, feeling of choking, hyperventilation, impotence, insomnia, malaise, nausea, obsessions, shortness of breath or suicidal ideas. Symptoms occur most days. The severity of symptoms is moderate to severe and causing significant distress. The quality of sleep is fair. Nighttime awakenings: occasional.     Compliance with medications is %.     The following portions of the patient's history were reviewed and updated as appropriate: allergies, current medications, past family history, past medical history, past social history, past surgical history and problem list.    Review of Systems   Constitutional: Negative for chills, fatigue and fever.   HENT: Negative for congestion, sneezing, sore throat and trouble swallowing.    Eyes: Negative for visual disturbance.   Respiratory: Negative for cough, chest tightness, shortness of breath and wheezing.    Cardiovascular: Negative for chest pain, palpitations and leg swelling.   Gastrointestinal: Negative for abdominal pain, constipation, diarrhea, nausea and vomiting.   Genitourinary: Negative for dysuria, frequency and urgency.    Musculoskeletal: Positive for back pain. Negative for neck pain.   Skin: Negative for rash.   Neurological: Negative for dizziness, weakness and headaches.   Psychiatric/Behavioral:        In the past two weeks the pt has not felt down, depressed, hopeless or lost interest in doing things   All other systems reviewed and are negative.      Objective   Physical Exam   Constitutional: He is oriented to person, place, and time. He appears well-developed and well-nourished. He is cooperative. He does not have a sickly appearance. He does not appear ill.   HENT:   Head: Normocephalic and atraumatic.   Right Ear: External ear normal.   Left Ear: External ear normal.   Nose: Nose normal.   Mouth/Throat: Oropharynx is clear and moist.   Eyes: Conjunctivae and EOM are normal. Pupils are equal, round, and reactive to light. Right eye exhibits no discharge. Left eye exhibits no discharge. No scleral icterus.   Neck: Trachea normal, normal range of motion and phonation normal. Neck supple. No thyromegaly present.   Lipoma palpated on back of neck, rates pain a 6   Cardiovascular: Normal rate, regular rhythm, normal heart sounds and intact distal pulses.  Exam reveals no gallop and no friction rub.    No murmur heard.  Pulmonary/Chest: Effort normal and breath sounds normal. No respiratory distress. He has no wheezes. He has no rales.   Abdominal: Soft. Normal appearance and bowel sounds are normal. He exhibits no distension. There is no tenderness. There is no rebound and no guarding.   There is a firm area in the lower abdominal wall on the left just above the inguinal line.  It feels like it is in the fatty tissue about 3-4 cm total and is not particular tender to palpation.   Musculoskeletal: Normal range of motion. He exhibits no edema.        Lumbar back: He exhibits pain (chronic, rates pain a 5).       Vascular Status -  His exam exhibits right foot vasculature normal. His exam exhibits no right foot edema. His exam  exhibits left foot vasculature normal. His exam exhibits no left foot edema.  Lymphadenopathy:     He has no cervical adenopathy.   Neurological: He is alert and oriented to person, place, and time. No cranial nerve deficit. GCS eye subscore is 4. GCS verbal subscore is 5. GCS motor subscore is 6.   Skin: Skin is warm and dry. No rash noted.   Psychiatric: He has a normal mood and affect. His behavior is normal. Judgment and thought content normal.   Nursing note and vitals reviewed.      Assessment/Plan   Kuldip was seen today for follow-up, med refill and abdominal pain.    Diagnoses and all orders for this visit:    Abdominal wall mass  -     US Abdomen Limited    Generalized anxiety disorder    Other orders  -     Discontinue: ALPRAZolam (XANAX) 2 MG tablet; Take 1 tablet by mouth 2 (Two) Times a Day.  -     ALPRAZolam (XANAX) 2 MG tablet; Take 1 tablet by mouth 2 (Two) Times a Day.     refilled Xanax to use as needed for anxiety    We'll get an ultrasound of the area in the left lower abdominal wall.  It may be a hernia.  Consider referral back to surgeon if it continues to bother him.

## 2017-06-08 LAB
BASOPHILS # BLD AUTO: 0.02 10*3/MM3 (ref 0–0.2)
BASOPHILS NFR BLD AUTO: 0.5 % (ref 0–2)
DEPRECATED RDW RBC AUTO: 44.9 FL (ref 35.1–43.9)
EOSINOPHIL # BLD AUTO: 0.61 10*3/MM3 (ref 0–0.7)
EOSINOPHIL NFR BLD AUTO: 14.8 % (ref 0–7)
ERYTHROCYTE [DISTWIDTH] IN BLOOD BY AUTOMATED COUNT: 14.8 % (ref 11.5–14.5)
HCT VFR BLD AUTO: 40.9 % (ref 39–49)
HGB BLD-MCNC: 14.1 G/DL (ref 13.7–17.3)
LYMPHOCYTES # BLD AUTO: 1.68 10*3/MM3 (ref 0.6–4.2)
LYMPHOCYTES NFR BLD AUTO: 40.7 % (ref 10–50)
MCH RBC QN AUTO: 29 PG (ref 26.5–34)
MCHC RBC AUTO-ENTMCNC: 34.5 G/DL (ref 31.5–36.3)
MCV RBC AUTO: 84 FL (ref 80–98)
MONOCYTES # BLD AUTO: 0.35 10*3/MM3 (ref 0–0.9)
MONOCYTES NFR BLD AUTO: 8.5 % (ref 0–12)
NEUTROPHILS # BLD AUTO: 1.47 10*3/MM3 (ref 2–8.6)
NEUTROPHILS NFR BLD AUTO: 35.5 % (ref 37–80)
PLATELET # BLD AUTO: 222 10*3/MM3 (ref 150–450)
PMV BLD AUTO: 9.2 FL (ref 8–12)
RBC # BLD AUTO: 4.87 10*6/MM3 (ref 4.37–5.74)
WBC NRBC COR # BLD: 4.13 10*3/MM3 (ref 3.2–9.8)

## 2017-06-08 PROCEDURE — 80061 LIPID PANEL: CPT | Performed by: INTERNAL MEDICINE

## 2017-06-08 PROCEDURE — 82607 VITAMIN B-12: CPT | Performed by: INTERNAL MEDICINE

## 2017-06-08 PROCEDURE — 83036 HEMOGLOBIN GLYCOSYLATED A1C: CPT | Performed by: INTERNAL MEDICINE

## 2017-06-08 PROCEDURE — 85025 COMPLETE CBC W/AUTO DIFF WBC: CPT | Performed by: INTERNAL MEDICINE

## 2017-06-08 PROCEDURE — 84153 ASSAY OF PSA TOTAL: CPT | Performed by: INTERNAL MEDICINE

## 2017-06-08 PROCEDURE — 82043 UR ALBUMIN QUANTITATIVE: CPT | Performed by: INTERNAL MEDICINE

## 2017-06-08 PROCEDURE — 80053 COMPREHEN METABOLIC PANEL: CPT | Performed by: INTERNAL MEDICINE

## 2017-06-08 PROCEDURE — 84403 ASSAY OF TOTAL TESTOSTERONE: CPT | Performed by: INTERNAL MEDICINE

## 2017-06-08 PROCEDURE — 82306 VITAMIN D 25 HYDROXY: CPT | Performed by: INTERNAL MEDICINE

## 2017-06-08 PROCEDURE — 84443 ASSAY THYROID STIM HORMONE: CPT | Performed by: INTERNAL MEDICINE

## 2017-06-08 PROCEDURE — 82570 ASSAY OF URINE CREATININE: CPT | Performed by: INTERNAL MEDICINE

## 2017-06-09 LAB
25(OH)D3 SERPL-MCNC: 44.1 NG/ML (ref 30–100)
ALBUMIN SERPL-MCNC: 4.4 G/DL (ref 3.2–5.5)
ALBUMIN UR-MCNC: 12.6 MG/L
ALBUMIN/GLOB SERPL: 1.5 G/DL (ref 1–3)
ALP SERPL-CCNC: 44 U/L (ref 15–121)
ALT SERPL W P-5'-P-CCNC: 18 U/L (ref 10–60)
ANION GAP SERPL CALCULATED.3IONS-SCNC: 10 MMOL/L (ref 5–15)
AST SERPL-CCNC: 21 U/L (ref 10–60)
BILIRUB SERPL-MCNC: 0.5 MG/DL (ref 0.2–1)
BUN BLD-MCNC: 15 MG/DL (ref 8–25)
BUN/CREAT SERPL: 12.5 (ref 7–25)
CALCIUM SPEC-SCNC: 9.7 MG/DL (ref 8.4–10.8)
CHLORIDE SERPL-SCNC: 104 MMOL/L (ref 100–112)
CHOLEST SERPL-MCNC: 139 MG/DL (ref 150–200)
CO2 SERPL-SCNC: 24 MMOL/L (ref 20–32)
CREAT BLD-MCNC: 1.2 MG/DL (ref 0.4–1.3)
CREAT UR-MCNC: 229.4 MG/DL
GFR SERPL CREATININE-BSD FRML MDRD: 81 ML/MIN/1.73 (ref 63–147)
GLOBULIN UR ELPH-MCNC: 3 GM/DL (ref 2.5–4.6)
GLUCOSE BLD-MCNC: 98 MG/DL (ref 70–100)
HBA1C MFR BLD: 6.42 % (ref 4–5.6)
HDLC SERPL-MCNC: 35 MG/DL (ref 35–100)
LDLC SERPL CALC-MCNC: 90 MG/DL
LDLC/HDLC SERPL: 2.57 {RATIO}
MICROALBUMIN/CREAT UR: 54.9 MG/G (ref 0–30)
POTASSIUM BLD-SCNC: 4.7 MMOL/L (ref 3.4–5.4)
PROT SERPL-MCNC: 7.4 G/DL (ref 6.7–8.2)
SODIUM BLD-SCNC: 138 MMOL/L (ref 134–146)
TRIGL SERPL-MCNC: 71 MG/DL (ref 35–160)
TSH SERPL DL<=0.05 MIU/L-ACNC: 2.46 MIU/ML (ref 0.46–4.68)
VIT B12 BLD-MCNC: 637 PG/ML (ref 239–931)
VLDLC SERPL-MCNC: 14.2 MG/DL

## 2017-06-10 LAB
CONV COMMENT: NORMAL
PSA SERPL-MCNC: 0.6 NG/ML (ref 0–4)
REFLEX: NORMAL
TESTOST SERPL-MCNC: 538 NG/DL (ref 348–1197)

## 2017-06-12 NOTE — PROGRESS NOTES
Notify patient test results are ok.  There is no mass.  Tell the patient that the radiologist does see the knots that he's complaining about and they look like just calcifications from where he has received shots in his abdomen in the past.  There is nothing suspicious about the

## 2017-06-19 ENCOUNTER — OFFICE VISIT (OUTPATIENT)
Dept: FAMILY MEDICINE CLINIC | Facility: CLINIC | Age: 42
End: 2017-06-19

## 2017-06-19 VITALS
BODY MASS INDEX: 39.01 KG/M2 | WEIGHT: 288 LBS | DIASTOLIC BLOOD PRESSURE: 72 MMHG | HEIGHT: 72 IN | SYSTOLIC BLOOD PRESSURE: 120 MMHG

## 2017-06-19 DIAGNOSIS — T80.90XS INJECTION SITE REACTION, SEQUELA: ICD-10-CM

## 2017-06-19 DIAGNOSIS — F41.1 GENERALIZED ANXIETY DISORDER: Primary | ICD-10-CM

## 2017-06-19 PROCEDURE — 99214 OFFICE O/P EST MOD 30 MIN: CPT | Performed by: FAMILY MEDICINE

## 2017-06-19 NOTE — PROGRESS NOTES
Subjective   Kuldip Castaneda is a 41 y.o. male who presents to the office for follow-up and review of labs.  He had some recent testing including a CT for a mass in the abdomen that appeared to be consistent with a calcification in the abdominal fat likely from previous abdominal injections, which he has taken.  He does labs as below.     History of Present Illness     The following portions of the patient's history were reviewed and updated as appropriate: allergies, current medications, past family history, past medical history, past social history, past surgical history and problem list.    Review of Systems   Constitutional: Negative for chills, fatigue and fever.   HENT: Negative for congestion, sneezing, sore throat and trouble swallowing.    Eyes: Negative for visual disturbance.   Respiratory: Negative for cough, chest tightness, shortness of breath and wheezing.    Cardiovascular: Negative for chest pain, palpitations and leg swelling.   Gastrointestinal: Negative for abdominal pain, constipation, diarrhea, nausea and vomiting.   Genitourinary: Negative for dysuria, frequency and urgency.   Musculoskeletal: Positive for back pain. Negative for neck pain.   Skin: Negative for rash.   Neurological: Negative for dizziness, weakness and headaches.   Psychiatric/Behavioral: The patient is nervous/anxious.         In the past two weeks the pt has not felt down, depressed, hopeless or lost interest in doing things   All other systems reviewed and are negative.      Objective   Physical Exam   Constitutional: He is oriented to person, place, and time. He appears well-developed and well-nourished. He is cooperative. He does not have a sickly appearance. He does not appear ill.   HENT:   Head: Normocephalic and atraumatic.   Right Ear: External ear normal.   Left Ear: External ear normal.   Nose: Nose normal.   Mouth/Throat: Oropharynx is clear and moist.   Eyes: Conjunctivae and EOM are normal. Pupils are equal,  round, and reactive to light. Right eye exhibits no discharge. Left eye exhibits no discharge. No scleral icterus.   Neck: Trachea normal, normal range of motion and phonation normal. Neck supple. No thyromegaly present.   Lipoma palpated on back of neck, rates pain a 6   Cardiovascular: Normal rate, regular rhythm, normal heart sounds and intact distal pulses.  Exam reveals no gallop and no friction rub.    No murmur heard.  Pulmonary/Chest: Effort normal and breath sounds normal. No respiratory distress. He has no wheezes. He has no rales.   Abdominal: Soft. Normal appearance and bowel sounds are normal. He exhibits no distension. There is no tenderness. There is no rebound and no guarding.   There is a firm area in the lower abdominal wall on the left just above the inguinal line.  It feels like it is in the fatty tissue about 3-4 cm total and is not particular tender to palpation.   Musculoskeletal: Normal range of motion. He exhibits no edema.        Lumbar back: He exhibits pain (chronic, rates pain a 5).       Vascular Status -  His exam exhibits right foot vasculature normal. His exam exhibits no right foot edema. His exam exhibits left foot vasculature normal. His exam exhibits no left foot edema.  Lymphadenopathy:     He has no cervical adenopathy.   Neurological: He is alert and oriented to person, place, and time. No cranial nerve deficit. GCS eye subscore is 4. GCS verbal subscore is 5. GCS motor subscore is 6.   Skin: Skin is warm and dry. No rash noted.   Psychiatric: He has a normal mood and affect. His behavior is normal. Judgment and thought content normal.   Nursing note and vitals reviewed.      Assessment/Plan   Kuldip was seen today for labs only.    Diagnoses and all orders for this visit:    Generalized anxiety disorder    Injection site reaction, sequela   reviewed all his labs and radiology findings with him today.  He continues to have a high level of stress and worry over her physical  symptoms.  Reassurance is given today.  He's to continue with current medications.  His blood sugar is better and his A1c has improved.  Test blood sugars daily and as needed, recheck labs in 6 months.     Labs are reviewed with patient.      ]

## 2017-06-22 ENCOUNTER — OFFICE VISIT (OUTPATIENT)
Dept: GASTROENTEROLOGY | Facility: CLINIC | Age: 42
End: 2017-06-22

## 2017-06-22 VITALS
SYSTOLIC BLOOD PRESSURE: 132 MMHG | HEART RATE: 67 BPM | BODY MASS INDEX: 38.18 KG/M2 | WEIGHT: 281.9 LBS | DIASTOLIC BLOOD PRESSURE: 88 MMHG | HEIGHT: 72 IN

## 2017-06-22 DIAGNOSIS — K21.9 GASTROESOPHAGEAL REFLUX DISEASE, ESOPHAGITIS PRESENCE NOT SPECIFIED: Primary | ICD-10-CM

## 2017-06-22 PROCEDURE — 99213 OFFICE O/P EST LOW 20 MIN: CPT | Performed by: INTERNAL MEDICINE

## 2017-06-22 RX ORDER — OMEPRAZOLE 20 MG/1
20 CAPSULE, DELAYED RELEASE ORAL DAILY
Qty: 30 CAPSULE | Refills: 5 | Status: SHIPPED | OUTPATIENT
Start: 2017-06-22 | End: 2017-10-04 | Stop reason: HOSPADM

## 2017-06-22 NOTE — PROGRESS NOTES
[facility]      Chief Complaint:   Chief Complaint   Patient presents with   • egd follow up       Subjective     HPI:   Patient with epigastric abdominal pain EGD shows patient to have mild esophagitis and gastritis biopsies are only significant for mild inflammation.  Patient is not currently taking any medication for his abdominal discomfort.    Plan; place patient on Prilosec 20 mg daily.  Will have patient follow up as needed.  Patient will need a repeat colonoscopy in 4 years.    Past Medical History:   Past Medical History:   Diagnosis Date   • Anxiety    • Biliary dyskinesia    • Blood in feces         • Chest pain    • Chronic cholecystitis without calculus     postoperative      • Depressive disorder    • Epigastric pain    • Essential hypertension    • Gastro-esophageal reflux disease with esophagitis    • Generalized anxiety disorder    • Genital warts     large left groin      • Glaucoma suspect     suspicious disc cupping      • Hashimoto's thyroiditis    • Hematochezia    • History of echocardiogram 01/15/2016    Mild concentric LV hypertrophy with normal left atrial and aortic root size. LV systolic function is overall well preserved with EF 55-60%. Mitral valve mildly thickened with adequate opening.   • Hypercholesterolemia    • Hyperlipemia    • Hypertensive disorder    • Lipoma of anterior chest wall     left   • Major depressive disorder    • Microscopic hematuria    • Morbid obesity    • Multiple acquired skin tags     in groin   • Nausea    • Nausea and vomiting    • Obesity    • Pain in pelvis    • Painful urging to urinate    • Panic disorder    • Right upper quadrant pain    • Transient visual loss     resolved, prob BS elevation      • Type 2 diabetes mellitus     not on medications at this time    • Visual disturbance    • Vitamin D deficiency        Family History:  Family History   Problem Relation Age of Onset   • No Known Problems Mother    • No Known Problems  "Father        Social History:   reports that he has quit smoking. He has never used smokeless tobacco. He reports that he does not drink alcohol or use illicit drugs.    Medications:   Current Outpatient Prescriptions   Medication Sig Dispense Refill   • ALPRAZolam (XANAX) 2 MG tablet Take 1 tablet by mouth 2 (Two) Times a Day. 60 tablet 2   • atorvastatin (LIPITOR) 20 MG tablet Take 1 tablet by mouth Every Night. 30 tablet 10   • B-D 3CC LUER-JUAN DIEGO SYR 25GX1/2\" 25G X 1-1/2\" 3 ML misc   5   • cyanocobalamin 1000 MCG/ML injection Inject 1 ml (1,000 mcg) intramuscularly every 14 days (Patient taking differently: Inject 1,000 mcg as directed Every 14 (Fourteen) Days.) 2 mL 3   • enalapril (VASOTEC) 10 MG tablet Take 1 tablet by mouth Daily. 30 tablet 5   • SYNTHROID 50 MCG tablet Take 1 tablet by mouth Every Morning. 30 tablet 7   • Syringe/Needle, Disp, (B-D 3CC LUER-JUAN DIEGO SYR 24TY2-8/2) 21G X 1-1/2\" 3 ML misc FOR USE WITH TESTOSTERONE **RX DISP ALSO 18 GAUGE NEEDLE TO DRAW UP** 2 each 3   • Syringe/Needle, Disp, (B-D 3CC LUER-JUAN DIEGO SYR 06SZ4-1/2) 21G X 1-1/2\" 3 ML misc INJECT 1 SYRINGE INTO THE BUTTOCKS MUSCLE EVERY 14 DAYS **DISPENSE 18 GAUGE NEEDLE** 2 each 6   • Syringe/Needle, Disp, (B-D 3CC LUER-JUAN DIEGO SYR 78NL9-7/2) 22G X 1-1/2\" 3 ML misc Inject 1 syringe into the shoulder, thigh, or buttocks Every 14 (Fourteen) Days. 2 each 5   • Syringe/Needle, Disp, (B-D SYRINGE/NEEDLE 1CC/25GX5/8) 25G X 5/8\" 1 ML misc FOR USE WITH VIT B-12 SHOT 2 each 3   • Testosterone Cypionate (DEPOTESTOTERONE CYPIONATE) 200 MG/ML injection 100mg every 10 days , Provide(#3)18G,(#3)21Gneedles (#3)3mlsyringes q month (Patient taking differently: Inject 100 mg into the shoulder, thigh, or buttocks. 100mg every 10 days , Provide(#3)18G,(#3)21Gneedles (#3)3mlsyringes q month) 3 mL 5   • Testosterone Cypionate (DEPOTESTOTERONE CYPIONATE) 200 MG/ML injection Inject 0.5 mL into the shoulder, thigh, or buttocks every 10 days 3 mL 5   • vitamin D " "(ERGOCALCIFEROL) 66679 UNITS capsule capsule Take 1 capsule by mouth 2 (Two) Times a Week. 8 capsule 10   • omeprazole (priLOSEC) 20 MG capsule Take 1 capsule by mouth Daily. 30 capsule 5     Current Facility-Administered Medications   Medication Dose Route Frequency Provider Last Rate Last Dose   • cyanocobalamin injection 1,000 mcg  1,000 mcg Intramuscular Q28 Days SARIKA Napoles   1,000 mcg at 11/16/16 1037       Allergies:  Review of patient's allergies indicates no known allergies.    ROS:    Review of Systems   Constitutional: Negative for activity change, appetite change, chills, diaphoresis, fatigue, fever and unexpected weight change.   HENT: Negative for sore throat and trouble swallowing.    Respiratory: Negative for shortness of breath.    Gastrointestinal: Negative for abdominal distention, abdominal pain, anal bleeding, blood in stool, constipation, diarrhea, nausea, rectal pain and vomiting.   Musculoskeletal: Negative for arthralgias.   Skin: Negative for pallor.   Neurological: Negative for light-headedness.     Objective     Blood pressure 132/88, pulse 67, height 72\" (182.9 cm), weight 281 lb 14.4 oz (128 kg).    Physical Exam   Constitutional: He is oriented to person, place, and time. He appears well-developed and well-nourished. No distress.   HENT:   Head: Normocephalic and atraumatic.   Cardiovascular: Normal rate, regular rhythm, normal heart sounds and intact distal pulses.  Exam reveals no gallop and no friction rub.    No murmur heard.  Pulmonary/Chest: Breath sounds normal. No respiratory distress. He has no wheezes. He has no rales. He exhibits no tenderness.   Abdominal: Soft. Bowel sounds are normal. He exhibits no distension and no mass. There is no tenderness. There is no rebound and no guarding. No hernia.   Musculoskeletal: Normal range of motion. He exhibits no edema.   Neurological: He is alert and oriented to person, place, and time.   Skin: Skin is warm and dry. No rash " noted. He is not diaphoretic. No erythema. No pallor.   Psychiatric: He has a normal mood and affect. His behavior is normal. Judgment and thought content normal.        Assessment/Plan   Kuldip was seen today for egd follow up.    Diagnoses and all orders for this visit:    Gastroesophageal reflux disease, esophagitis presence not specified  -     omeprazole (priLOSEC) 20 MG capsule; Take 1 capsule by mouth Daily.        * Surgery not found *     Diagnosis Plan   1. Gastroesophageal reflux disease, esophagitis presence not specified  omeprazole (priLOSEC) 20 MG capsule       Anticipated Surgical Procedure:  No orders of the defined types were placed in this encounter.      The risks, benefits, and alternatives of this procedure have been discussed with the patient or the responsible party- the patient understands and agrees to proceed.

## 2017-07-11 ENCOUNTER — APPOINTMENT (OUTPATIENT)
Dept: CT IMAGING | Facility: HOSPITAL | Age: 42
End: 2017-07-11

## 2017-07-11 ENCOUNTER — APPOINTMENT (OUTPATIENT)
Dept: GENERAL RADIOLOGY | Facility: HOSPITAL | Age: 42
End: 2017-07-11

## 2017-07-11 ENCOUNTER — HOSPITAL ENCOUNTER (OUTPATIENT)
Facility: HOSPITAL | Age: 42
Setting detail: OBSERVATION
Discharge: HOME | End: 2017-07-14
Attending: FAMILY MEDICINE | Admitting: FAMILY MEDICINE

## 2017-07-11 DIAGNOSIS — R07.9 CHEST PAIN, UNSPECIFIED TYPE: Primary | ICD-10-CM

## 2017-07-11 DIAGNOSIS — I20.9 ISCHEMIC CHEST PAIN (HCC): ICD-10-CM

## 2017-07-11 LAB
ALBUMIN SERPL-MCNC: 4.9 G/DL (ref 3.4–4.8)
ALBUMIN/GLOB SERPL: 1.2 G/DL (ref 1.1–1.8)
ALP SERPL-CCNC: 69 U/L (ref 38–126)
ALT SERPL W P-5'-P-CCNC: 36 U/L (ref 21–72)
AMPHET+METHAMPHET UR QL: NEGATIVE
ANION GAP SERPL CALCULATED.3IONS-SCNC: 20 MMOL/L (ref 5–15)
AST SERPL-CCNC: 29 U/L (ref 17–59)
BACTERIA UR QL AUTO: ABNORMAL /HPF
BARBITURATES UR QL SCN: NEGATIVE
BENZODIAZ UR QL SCN: POSITIVE
BILIRUB SERPL-MCNC: 1.2 MG/DL (ref 0.2–1.3)
BILIRUB UR QL STRIP: ABNORMAL
BUN BLD-MCNC: 13 MG/DL (ref 7–21)
BUN/CREAT SERPL: 10.7 (ref 7–25)
CALCIUM SPEC-SCNC: 10.1 MG/DL (ref 8.4–10.2)
CANNABINOIDS SERPL QL: POSITIVE
CHLORIDE SERPL-SCNC: 98 MMOL/L (ref 95–110)
CK MB SERPL-CCNC: 4.1 NG/ML (ref 0–5)
CK SERPL-CCNC: 343 U/L (ref 55–170)
CLARITY UR: CLEAR
CO2 SERPL-SCNC: 22 MMOL/L (ref 22–31)
COCAINE UR QL: NEGATIVE
COLOR UR: ABNORMAL
CREAT BLD-MCNC: 1.21 MG/DL (ref 0.7–1.3)
GFR SERPL CREATININE-BSD FRML MDRD: 80 ML/MIN/1.73 (ref 63–147)
GLOBULIN UR ELPH-MCNC: 4 GM/DL (ref 2.3–3.5)
GLUCOSE BLD-MCNC: 145 MG/DL (ref 60–100)
GLUCOSE BLDC GLUCOMTR-MCNC: 119 MG/DL (ref 70–130)
GLUCOSE UR STRIP-MCNC: NEGATIVE MG/DL
HGB UR QL STRIP.AUTO: ABNORMAL
HYALINE CASTS UR QL AUTO: ABNORMAL /LPF
KETONES UR QL STRIP: ABNORMAL
LEUKOCYTE ESTERASE UR QL STRIP.AUTO: NEGATIVE
METHADONE UR QL SCN: NEGATIVE
NITRITE UR QL STRIP: NEGATIVE
NT-PROBNP SERPL-MCNC: 207 PG/ML (ref 0–450)
OPIATES UR QL: NEGATIVE
OXYCODONE UR QL SCN: NEGATIVE
PH UR STRIP.AUTO: 6 [PH] (ref 5–9)
POTASSIUM BLD-SCNC: 3.8 MMOL/L (ref 3.5–5.1)
PROT SERPL-MCNC: 8.9 G/DL (ref 6.3–8.6)
PROT UR QL STRIP: ABNORMAL
RBC # UR: ABNORMAL /HPF
REF LAB TEST METHOD: ABNORMAL
SODIUM BLD-SCNC: 140 MMOL/L (ref 137–145)
SP GR UR STRIP: 1.03 (ref 1–1.03)
SQUAMOUS #/AREA URNS HPF: ABNORMAL /HPF
TROPONIN I SERPL-MCNC: 0.06 NG/ML
TSH SERPL DL<=0.05 MIU/L-ACNC: 3.5 MIU/ML (ref 0.46–4.68)
UROBILINOGEN UR QL STRIP: ABNORMAL
WBC UR QL AUTO: ABNORMAL /HPF
WHOLE BLOOD HOLD SPECIMEN: NORMAL

## 2017-07-11 PROCEDURE — 82553 CREATINE MB FRACTION: CPT | Performed by: FAMILY MEDICINE

## 2017-07-11 PROCEDURE — 96376 TX/PRO/DX INJ SAME DRUG ADON: CPT

## 2017-07-11 PROCEDURE — 80307 DRUG TEST PRSMV CHEM ANLYZR: CPT | Performed by: PHYSICIAN ASSISTANT

## 2017-07-11 PROCEDURE — G0378 HOSPITAL OBSERVATION PER HR: HCPCS

## 2017-07-11 PROCEDURE — 83880 ASSAY OF NATRIURETIC PEPTIDE: CPT | Performed by: PHYSICIAN ASSISTANT

## 2017-07-11 PROCEDURE — 71010 HC CHEST PA OR AP: CPT

## 2017-07-11 PROCEDURE — 96361 HYDRATE IV INFUSION ADD-ON: CPT

## 2017-07-11 PROCEDURE — 99285 EMERGENCY DEPT VISIT HI MDM: CPT

## 2017-07-11 PROCEDURE — 25010000002 LORAZEPAM PER 2 MG: Performed by: EMERGENCY MEDICINE

## 2017-07-11 PROCEDURE — 82962 GLUCOSE BLOOD TEST: CPT

## 2017-07-11 PROCEDURE — 84484 ASSAY OF TROPONIN QUANT: CPT | Performed by: FAMILY MEDICINE

## 2017-07-11 PROCEDURE — 84443 ASSAY THYROID STIM HORMONE: CPT | Performed by: FAMILY MEDICINE

## 2017-07-11 PROCEDURE — 84484 ASSAY OF TROPONIN QUANT: CPT | Performed by: PHYSICIAN ASSISTANT

## 2017-07-11 PROCEDURE — 80053 COMPREHEN METABOLIC PANEL: CPT | Performed by: FAMILY MEDICINE

## 2017-07-11 PROCEDURE — 93005 ELECTROCARDIOGRAM TRACING: CPT | Performed by: PHYSICIAN ASSISTANT

## 2017-07-11 PROCEDURE — 70450 CT HEAD/BRAIN W/O DYE: CPT

## 2017-07-11 PROCEDURE — 96374 THER/PROPH/DIAG INJ IV PUSH: CPT

## 2017-07-11 PROCEDURE — 81001 URINALYSIS AUTO W/SCOPE: CPT | Performed by: PHYSICIAN ASSISTANT

## 2017-07-11 PROCEDURE — 93010 ELECTROCARDIOGRAM REPORT: CPT | Performed by: INTERNAL MEDICINE

## 2017-07-11 PROCEDURE — 82550 ASSAY OF CK (CPK): CPT | Performed by: FAMILY MEDICINE

## 2017-07-11 RX ORDER — HYDROCHLOROTHIAZIDE 25 MG/1
25 TABLET ORAL DAILY
COMMUNITY
End: 2017-09-13 | Stop reason: SDUPTHER

## 2017-07-11 RX ORDER — ALPRAZOLAM 1 MG/1
2 TABLET ORAL 2 TIMES DAILY
Status: DISCONTINUED | OUTPATIENT
Start: 2017-07-11 | End: 2017-07-12

## 2017-07-11 RX ORDER — DEXTROSE MONOHYDRATE 25 G/50ML
25 INJECTION, SOLUTION INTRAVENOUS
Status: DISCONTINUED | OUTPATIENT
Start: 2017-07-11 | End: 2017-07-14 | Stop reason: HOSPADM

## 2017-07-11 RX ORDER — SODIUM CHLORIDE 0.9 % (FLUSH) 0.9 %
10 SYRINGE (ML) INJECTION AS NEEDED
Status: DISCONTINUED | OUTPATIENT
Start: 2017-07-11 | End: 2017-07-14 | Stop reason: HOSPADM

## 2017-07-11 RX ORDER — ACETYLCYSTEINE 100 MG/ML
600 SOLUTION ORAL; RESPIRATORY (INHALATION) EVERY 12 HOURS SCHEDULED
Status: DISCONTINUED | OUTPATIENT
Start: 2017-07-12 | End: 2017-07-13

## 2017-07-11 RX ORDER — LEVOTHYROXINE SODIUM 0.05 MG/1
50 TABLET ORAL EVERY MORNING
Status: DISCONTINUED | OUTPATIENT
Start: 2017-07-12 | End: 2017-07-14 | Stop reason: HOSPADM

## 2017-07-11 RX ORDER — PANTOPRAZOLE SODIUM 40 MG/1
40 TABLET, DELAYED RELEASE ORAL EVERY MORNING
Status: DISCONTINUED | OUTPATIENT
Start: 2017-07-12 | End: 2017-07-11

## 2017-07-11 RX ORDER — ONDANSETRON 2 MG/ML
4 INJECTION INTRAMUSCULAR; INTRAVENOUS EVERY 6 HOURS PRN
Status: DISCONTINUED | OUTPATIENT
Start: 2017-07-11 | End: 2017-07-14 | Stop reason: HOSPADM

## 2017-07-11 RX ORDER — ENALAPRIL MALEATE 10 MG/1
10 TABLET ORAL DAILY
Status: DISCONTINUED | OUTPATIENT
Start: 2017-07-11 | End: 2017-07-11

## 2017-07-11 RX ORDER — LORAZEPAM 2 MG/ML
0.5 INJECTION INTRAMUSCULAR ONCE
Status: COMPLETED | OUTPATIENT
Start: 2017-07-11 | End: 2017-07-11

## 2017-07-11 RX ORDER — HYDRALAZINE HYDROCHLORIDE 20 MG/ML
10 INJECTION INTRAMUSCULAR; INTRAVENOUS EVERY 6 HOURS PRN
Status: DISCONTINUED | OUTPATIENT
Start: 2017-07-11 | End: 2017-07-14 | Stop reason: HOSPADM

## 2017-07-11 RX ORDER — LORAZEPAM 2 MG/ML
1 INJECTION INTRAMUSCULAR ONCE
Status: COMPLETED | OUTPATIENT
Start: 2017-07-11 | End: 2017-07-11

## 2017-07-11 RX ORDER — SODIUM CHLORIDE 0.9 % (FLUSH) 0.9 %
1-10 SYRINGE (ML) INJECTION AS NEEDED
Status: DISCONTINUED | OUTPATIENT
Start: 2017-07-11 | End: 2017-07-14 | Stop reason: HOSPADM

## 2017-07-11 RX ORDER — LORAZEPAM 0.5 MG/1
0.5 TABLET ORAL EVERY 8 HOURS PRN
Status: DISCONTINUED | OUTPATIENT
Start: 2017-07-11 | End: 2017-07-14 | Stop reason: HOSPADM

## 2017-07-11 RX ORDER — NICOTINE POLACRILEX 4 MG
15 LOZENGE BUCCAL
Status: DISCONTINUED | OUTPATIENT
Start: 2017-07-11 | End: 2017-07-14 | Stop reason: HOSPADM

## 2017-07-11 RX ORDER — ASPIRIN 81 MG/1
324 TABLET, CHEWABLE ORAL ONCE
Status: COMPLETED | OUTPATIENT
Start: 2017-07-11 | End: 2017-07-11

## 2017-07-11 RX ORDER — LORAZEPAM 2 MG/ML
0.5 INJECTION INTRAMUSCULAR EVERY 8 HOURS PRN
Status: DISCONTINUED | OUTPATIENT
Start: 2017-07-11 | End: 2017-07-11

## 2017-07-11 RX ORDER — ATORVASTATIN CALCIUM 20 MG/1
20 TABLET, FILM COATED ORAL NIGHTLY
Status: DISCONTINUED | OUTPATIENT
Start: 2017-07-11 | End: 2017-07-11

## 2017-07-11 RX ORDER — ACETAMINOPHEN 325 MG/1
650 TABLET ORAL EVERY 4 HOURS PRN
Status: DISCONTINUED | OUTPATIENT
Start: 2017-07-11 | End: 2017-07-14 | Stop reason: HOSPADM

## 2017-07-11 RX ORDER — LEVOTHYROXINE SODIUM 50 MCG
50 TABLET ORAL EVERY MORNING
Qty: 30 TABLET | Refills: 7 | Status: ON HOLD | OUTPATIENT
Start: 2017-07-11 | End: 2017-10-04

## 2017-07-11 RX ORDER — ATORVASTATIN CALCIUM 20 MG/1
20 TABLET, FILM COATED ORAL NIGHTLY
Qty: 30 TABLET | Refills: 10 | Status: ON HOLD | OUTPATIENT
Start: 2017-07-11 | End: 2017-10-04

## 2017-07-11 RX ADMIN — ALPRAZOLAM 2 MG: 1 TABLET ORAL at 19:55

## 2017-07-11 RX ADMIN — ASPIRIN 81 MG 324 MG: 81 TABLET ORAL at 11:36

## 2017-07-11 RX ADMIN — LORAZEPAM 0.5 MG: 2 INJECTION INTRAMUSCULAR; INTRAVENOUS at 14:55

## 2017-07-11 RX ADMIN — LORAZEPAM 1 MG: 2 INJECTION INTRAMUSCULAR; INTRAVENOUS at 10:46

## 2017-07-11 RX ADMIN — SODIUM CHLORIDE 1000 ML: 9 INJECTION, SOLUTION INTRAVENOUS at 12:43

## 2017-07-11 RX ADMIN — Medication 10 ML: at 14:45
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
## 2017-07-11 NOTE — ED NOTES
Pt pulled out iv line from left arm, states he wanted to go home, iv located on floor with catheter intact, sterile dressing to site, no bleeding noted.  Iv restarted in right arm with 18g catheter, flushed easily and blood return noted, pt medicated with ativan for anxiety/restlessness     Elvi Kumar, RN  07/11/17 2965

## 2017-07-11 NOTE — NURSING NOTE
"Rivas Verdugo PA-C called. Patient wondering when psychologist would be in to see him as he is ready to leave. Wife very upset and was glaring at patient, patient was quiet and calm when approaching the nurses desk. Wife very agitated. Stated \"Get back in the room, what are you going to do, go walk around the streets of [place]? No, I don't think so. Sit down.\"     Dr. Mathur called and voicemail left in regards to when physician is going to see patient.    "

## 2017-07-11 NOTE — CONSULTS
Cardiology Consultation Note.        Patient Name: Kuldip Castaneda  Age/Sex: 41 y.o. male  : 1975  MRN: [id number]    Date of consultation: 2017  Consulting Physician: Dirk Dawson MD  Primary care Physician: Aura Carrillo MD  Requesting Physician:   SIMONE Jackson   Reason for consultation:  Chest pain and positive troponin.      Subjective:       Chief Complaint: Chest pain.    History of Present Illness:  Kuldip Castaneda is a 41 y.o. male     Body mass index is 37.3 kg/(m^2). with a past medical history significant for coronary angiogram performed on 2016 which didn't revealed no evidence of any obstructive epicardial coronary artery disease, history of arterial hypertension, hypertensive heart disease, obesity, mild anemia, vitamin D deficiency, and diabetes.  Patient over the last 2 weeks has been having symptoms of chest discomfort.  Patient initially attributed his discomfort to panic attack.  Patient has had symptoms of chest discomfort on and off.  Due to the patient's persistent discomfort along with symptoms of profuse diaphoresis lightheaded dizziness and presyncopal symptoms patient presented to the emergency room.  Patient initial resting electrocardiogram on presentation to the emergency room did not show any acute ST-T wave changes.  Patient underwent further evaluation including a CT of the head which was unremarkable.    Patient on further questioning does complain of having symptoms of palpitation.  Patient denies any fever or chill productive cough.  Patient denies any recent weight loss.    Patient on further questioning denies any lower extremity edema.  Patient denies any cough muscle tenderness.  Patient denies any tonic-clonic activity.    Patient 10 point review of system except for stated in the history of present illness is negative      Past Medical History:    1.  Chest pain.   2. Coronary Angiogram performed on 2016 revealed no evidence of any  "obstructive epicardial coronary artery disease.  3. Obesity.  Body mask index of 37   4. Arterial hypertension.  5. Hypertensive heart disease.  6. Newly diagnosed diabetes.  7. Vitamin D deficiency  8. Gastroesophageal reflux disease.  9. Acute renal insufficiency secondary to dehydration.  10. Anxiety.  11. Chronic back pain.  12. Hyperlipidemia    Past Surgical History:    1. Cholecystectomy.  2. Lipoma excision on the left side of the chest.  3. Back surgery.    Family History:     Significant for diabetes in both parents and father who had a fatal myocardial infarction at age 47.    Social History:      Patient smokes up to 1 cigarette a day with previous history of marijuana abuse. Denies any drug abuse.    Cardiac Risk factor:     1. Male.  2. Obesity  3. Arterial hypertension.  4. Hyperlipidemia .  5. Diabetes.  6. Infrequent tobacco abuse.  7. Family history for coronary artery disease.      Allergies:  No Known Allergies    Medication::  Facility-Administered Medications Prior to Admission   Medication Dose Route Frequency Provider Last Rate Last Dose   • cyanocobalamin injection 1,000 mcg  1,000 mcg Intramuscular Q28 Days SARIKA Napoles   1,000 mcg at 11/16/16 1037     Prescriptions Prior to Admission   Medication Sig Dispense Refill Last Dose   • ALPRAZolam (XANAX) 2 MG tablet Take 1 tablet by mouth 2 (Two) Times a Day. 60 tablet 2 7/6/2017   • B-D 3CC LUER-JUAN DIEGO SYR 25GX1/2\" 25G X 1-1/2\" 3 ML misc   5 Taking   • cyanocobalamin 1000 MCG/ML injection Inject 1 ml (1,000 mcg) intramuscularly every 14 days (Patient taking differently: Inject 1,000 mcg as directed Every 14 (Fourteen) Days.) 2 mL 3 7/4/2017   • hydrochlorothiazide (HYDRODIURIL) 25 MG tablet Take 25 mg by mouth Daily.   6/20/2017   • SYNTHROID 50 MCG tablet Take 1 tablet by mouth Every Morning. 30 tablet 7 6/6/2017   • Syringe/Needle, Disp, (B-D 3CC LUER-JUAN DIEGO SYR 63MD9-0/2) 21G X 1-1/2\" 3 ML misc FOR USE WITH TESTOSTERONE **RX DISP ALSO 18 " "GAUGE NEEDLE TO DRAW UP** 2 each 3 Taking   • Syringe/Needle, Disp, (B-D 3CC LUER-JUAN DIEGO SYR 80CP5-0/2) 21G X 1-1/2\" 3 ML misc INJECT 1 SYRINGE INTO THE BUTTOCKS MUSCLE EVERY 14 DAYS **DISPENSE 18 GAUGE NEEDLE** 2 each 6 Taking   • Syringe/Needle, Disp, (B-D 3CC LUER-JUAN DIEGO SYR 05XD2-4/2) 22G X 1-1/2\" 3 ML misc Inject 1 syringe into the shoulder, thigh, or buttocks Every 14 (Fourteen) Days. 2 each 5 Taking   • Syringe/Needle, Disp, (B-D SYRINGE/NEEDLE 1CC/25GX5/8) 25G X 5/8\" 1 ML misc FOR USE WITH VIT B-12 SHOT 2 each 3 Taking   • Testosterone Cypionate (DEPOTESTOTERONE CYPIONATE) 200 MG/ML injection 100mg every 10 days , Provide(#3)18G,(#3)21Gneedles (#3)3mlsyringes q month (Patient taking differently: Inject 100 mg into the shoulder, thigh, or buttocks. 100mg every 10 days , Provide(#3)18G,(#3)21Gneedles (#3)3mlsyringes q month) 3 mL 5 6/20/2017   • Testosterone Cypionate (DEPOTESTOTERONE CYPIONATE) 200 MG/ML injection Inject 0.5 mL into the shoulder, thigh, or buttocks every 10 days 3 mL 5 Taking   • vitamin D (ERGOCALCIFEROL) 30017 UNITS capsule capsule Take 1 capsule by mouth 2 (Two) Times a Week. 8 capsule 10 6/27/2017   • atorvastatin (LIPITOR) 20 MG tablet Take 1 tablet by mouth Every Night. 30 tablet 10    • enalapril (VASOTEC) 10 MG tablet Take 1 tablet by mouth Daily. 30 tablet 5 Taking   • metoprolol tartrate (LOPRESSOR) 25 MG tablet Take 25 mg by mouth 2 (Two) Times a Day.      • omeprazole (priLOSEC) 20 MG capsule Take 1 capsule by mouth Daily. 30 capsule 5            Review of Systems:       Constitutional:  Denies recent weight loss, weight gain, fever or chills, no change in exercise tolerance     HENT:  Denies any hearing loss, epistaxis, hoarseness, or difficulty speaking.     Eyes: Wears eyeglasses or contact lenses     Respiratory:  Denies dyspnea with exertion,no cough, wheezing, or hemoptysis.     Cardiovascular: Positive for chest pain lightheaded dizziness presyncopal symptoms.  Negative for " palpations,  orthopnea, PND, peripheral edema, syncope, or claudication.     Gastrointestinal:  Denies change in bowel habits, dyspepsia, ulcer disease, hematochezia, or melena.  No nausea, no vomiting, no hematemesis, no diarrhea or constipation, no melena      Endocrine: Negative for cold intolerance, heat intolerance, polydipsia, polyphagia and polyuria. Denies any history of weight change, heat/cold intolerance, polydipsia, polyuria     Genitourinary: Negative hor hematuria.      Musculoskeletal: Denies any history of arthritic symptoms or back problems .  No joint pain, joint stiffness, joint swelling, muscle pain, muscle weakness and neck pain    Skin:  Denies any change in hair or nails, rashes, or skin lesions.     Allergic/Immunologic: Negative.  Negative for environmental allergies, food allergies and immunocompromised state.     Neurological:  Denies any history of recurrent headaches, strokes, TIA, or seizure disorder.     Hematological: Denies excessive bleeding, easy bruising, fatigue, lymphadenopathy and petechiae or any bleeding disorders, or lymphadenopathy.     Psychiatric/Behavioral: Denies any history of depression, substance abuse, or change in cognitive function. Denies any psychomotor reaction or tangential thought.  No depression, homicidal ideations and suicidal ideations    Endocrine: No frequent urination and nocturia, temperature intolerance, weight gain, unintended and weight loss, unintended            Objective:     Objective:  Vitals:    07/11/17 1618   BP: 162/96   Pulse: 84   Resp: 20   Temp: 97.5 °F (36.4 °C)   SpO2: 98%     .    Body mass index is 37.3 kg/(m^2).           Physical Exam:   General Appearance:    Alert, oriented, cooperative, in no acute distress   Head:    Normocephalic, atraumatic, without obvious abnormality   Eyes:           MICKEY  Lids and lashes normal, conjunctivae and sclerae normal, no icterus, no pallor   Ears:    Ears appear intact with no abnormalities  noted   Throat:   Mucous membranes pink and moist   Neck:   Supple, trachea midline, no carotid bruit, no organomegaly or JVD   Lungs:     Clear to auscultation and percussion, respirations regular, even and Unlabored. No wheezes, rales, rhonchi    Heart:    Regular rhythm and normal rate, normal S1 and S2, no            murmur, no gallop, no rub, no click   Abdomen:     Soft, non-tender, non-distended, no guarding, no rebound tenderness, Normal bowel sounds in all four quadrant, no masses, liver and spleen nonpalpable,    Genitalia:    Deferred   Extremities:   Moves all extremities well, no edema, no cyanosis, no              Redness, no clubbing   Pulses:   Pulses palpable and equal bilaterally   Skin:   Moist and warm. No bleeding, bruising or rash   Neurologic/Psychiatric:   Alert and oriented to person, place, and time.  Motor, power and tone in upper and lower extremity is grossly intact.  No focal neurological deficits. Normal cognitive function. No psychomotor reaction or tangential thought. No depression, homicidal ideations and suicidal ideations           Lab Review:       Results from last 7 days  Lab Units 07/11/17  0943   SODIUM mmol/L 140   POTASSIUM mmol/L 3.8   CHLORIDE mmol/L 98   CO2 mmol/L 22.0   BUN mg/dL 13   CREATININE mg/dL 1.21   CALCIUM mg/dL 10.1   BILIRUBIN mg/dL 1.2   ALK PHOS U/L 69   ALT (SGPT) U/L 36   AST (SGOT) U/L 29   GLUCOSE mg/dL 145*       Results from last 7 days  Lab Units 07/11/17  0943   TROPONIN I ng/mL 0.064*                           Results from last 7 days  Lab Units 07/11/17  0943   TSH mIU/mL 3.500       EKG:   ECG/EMG Results (last 24 hours)     Procedure Component Value Units Date/Time    SCANNED EKG [[id number]] Resulted:  07/11/17      Updated:  07/11/17 1504          Imaging:  Imaging Results (last 24 hours)     Procedure Component Value Units Date/Time    XR Chest 1 View [[id number]] Collected:  07/11/17 1100     Updated:  07/11/17 1117    Narrative:       Chest  single view       CLINICAL INDICATION: Alteration mental status.    COMPARISON: Chest July 26, 2016.        FINDINGS: Cardiac silhouette is normal in size. Pulmonary  vascularity is unremarkable.     No focal infiltrate or consolidation.  No pleural effusion.  No  pneumothorax.      Impression:       CONCLUSION: No evidence of active disease.    Electronically signed by:  Jossue Garcia MD  7/11/2017 11:16 AM CDT  Workstation: [id number]    CT Head Without Contrast [[id number]] Collected:  07/11/17 1218     Updated:  07/11/17 1252    Narrative:         PROCEDURE: CT head without contrast    REASON FOR EXAM: altered mental status    This exam was performed according to our departmental  dose-optimization program, which includes automated exposure  control, adjustment of the mA and/or kV according to patient size  and/or use of iterative reconstruction technique.    FINDINGS: No comparison. Axial computer tomography sequential  imaging of the head was performed from the vertex to the base of  the skull. .Sagittal and coronal reformation was performed .    The skull vault is intact. Paranasal sinuses and bilateral  mastoid air cells are well aerated. Cerebral and cerebellar  parenchymal are normal. Ventricular system and subarachnoid  spaces are normal.      Impression:       Normal CT of the head.    Electronically signed by:  Solis Hernández MD  7/11/2017 12:51 PM CDT  Workstation: [id number]          I personally viewed and interpreted the patient's EKG/Telemetry data.    Assessment:   1.  Chest pain.  Indeterminate troponin.  2.  Obesity.  3.  Arterial hypertension.  4.  Hypertensive heart disease.  5.  Family history for coronary artery disease.  6.  Hyperlipidemia.          Plan:   1.  Chest pain. Patient resting electrocardiogram done revealed sinus rhythm with nonspecific ST-T wave changes.  Pelvis supple ST-T wave changes in the anterior wall distribution.  Patient continues to have waxing and waning chest pain  even after being hospitalized.  Patient had mild elevation in the troponin.  Patient last coronary angiogram done in January 2016 had not revealed of any evidence of any obstructive epicardial coronary artery disease.  At the present time patient continues to have symptoms of chest discomfort suggestive of angina.  Would repeat another set of troponin.  If the patient has rising troponin then would consider a coronary angiogram to rule out progression of the coronary artery disease.  Patient does have risk factors for coronary artery disease.  2.  Arterial hypertension.  Patient blood pressure is currently labile.  Patient has been counseled to decrease the salt intake and would continue with the present antihypertensive medication.  3.  Hypertensive heart disease.  Clinically patient is not in congestive heart failure.  4.  Hyperlipidemia.  Patient has been counseled on low-fat low-cholesterol diet.  5.  Obesity.  Patient has been recommended weight reduction lifestyle modification and dietary restriction.  6.  Diabetes.  Patient would be administered Mucomyst and IV hydration should the patient needs coronary angiogram.    Thank you for the consultation.        Time: time spent in face-to-face evaluation off greater than 55  minutes and interacting and formulating examining and discussing the plan with the patient with 50% of greater time spent in face-to-face interaction.    Dirk Dawson MD  07/11/17  5:04 PM      EMR Dragon/Transcription disclaimer:   Some of this note may be an electronic transcription/translation of spoken language to printed text. The electronic translation of spoken language may permit erroneous, or at times, nonsensical words or phrases to be inadvertently transcribed; Although I have reviewed the note for such errors, some may still exist.

## 2017-07-12 ENCOUNTER — APPOINTMENT (OUTPATIENT)
Dept: CT IMAGING | Facility: HOSPITAL | Age: 42
End: 2017-07-12

## 2017-07-12 LAB
ALBUMIN SERPL-MCNC: 4 G/DL (ref 3.4–4.8)
ALBUMIN/GLOB SERPL: 1.2 G/DL (ref 1.1–1.8)
ALP SERPL-CCNC: 52 U/L (ref 38–126)
ALT SERPL W P-5'-P-CCNC: 38 U/L (ref 21–72)
ANION GAP SERPL CALCULATED.3IONS-SCNC: 13 MMOL/L (ref 5–15)
ARTICHOKE IGE QN: 142 MG/DL (ref 1–129)
AST SERPL-CCNC: 32 U/L (ref 17–59)
BILIRUB SERPL-MCNC: 1.3 MG/DL (ref 0.2–1.3)
BUN BLD-MCNC: 12 MG/DL (ref 7–21)
BUN/CREAT SERPL: 10.6 (ref 7–25)
CALCIUM SPEC-SCNC: 9.2 MG/DL (ref 8.4–10.2)
CHLORIDE SERPL-SCNC: 101 MMOL/L (ref 95–110)
CHOLEST SERPL-MCNC: 193 MG/DL (ref 0–199)
CO2 SERPL-SCNC: 24 MMOL/L (ref 22–31)
CREAT BLD-MCNC: 1.13 MG/DL (ref 0.7–1.3)
DEPRECATED RDW RBC AUTO: 44 FL (ref 35.1–43.9)
ERYTHROCYTE [DISTWIDTH] IN BLOOD BY AUTOMATED COUNT: 14.5 % (ref 11.5–14.5)
GFR SERPL CREATININE-BSD FRML MDRD: 87 ML/MIN/1.73 (ref 63–147)
GLOBULIN UR ELPH-MCNC: 3.3 GM/DL (ref 2.3–3.5)
GLUCOSE BLD-MCNC: 105 MG/DL (ref 60–100)
GLUCOSE BLDC GLUCOMTR-MCNC: 109 MG/DL (ref 70–130)
GLUCOSE BLDC GLUCOMTR-MCNC: 122 MG/DL (ref 70–130)
GLUCOSE BLDC GLUCOMTR-MCNC: 88 MG/DL (ref 70–130)
GLUCOSE BLDC GLUCOMTR-MCNC: 97 MG/DL (ref 70–130)
HBA1C MFR BLD: 6.02 % (ref 4–5.6)
HCT VFR BLD AUTO: 40.8 % (ref 39–49)
HDLC SERPL-MCNC: 31 MG/DL (ref 60–200)
HGB BLD-MCNC: 14.1 G/DL (ref 13.7–17.3)
LDLC/HDLC SERPL: 4.79 {RATIO} (ref 0–3.55)
MCH RBC QN AUTO: 28.8 PG (ref 26.5–34)
MCHC RBC AUTO-ENTMCNC: 34.6 G/DL (ref 31.5–36.3)
MCV RBC AUTO: 83.3 FL (ref 80–98)
PLATELET # BLD AUTO: 219 10*3/MM3 (ref 150–450)
PMV BLD AUTO: 9.7 FL (ref 8–12)
POTASSIUM BLD-SCNC: 3.6 MMOL/L (ref 3.5–5.1)
PROT SERPL-MCNC: 7.3 G/DL (ref 6.3–8.6)
RBC # BLD AUTO: 4.9 10*6/MM3 (ref 4.37–5.74)
SODIUM BLD-SCNC: 138 MMOL/L (ref 137–145)
TRIGL SERPL-MCNC: 68 MG/DL (ref 20–199)
TROPONIN I SERPL-MCNC: 0.04 NG/ML
TROPONIN I SERPL-MCNC: 0.05 NG/ML
TROPONIN I SERPL-MCNC: 0.05 NG/ML
WBC NRBC COR # BLD: 3.7 10*3/MM3 (ref 3.2–9.8)

## 2017-07-12 PROCEDURE — 83036 HEMOGLOBIN GLYCOSYLATED A1C: CPT | Performed by: FAMILY MEDICINE

## 2017-07-12 PROCEDURE — G0378 HOSPITAL OBSERVATION PER HR: HCPCS

## 2017-07-12 PROCEDURE — 80053 COMPREHEN METABOLIC PANEL: CPT | Performed by: INTERNAL MEDICINE

## 2017-07-12 PROCEDURE — 85027 COMPLETE CBC AUTOMATED: CPT | Performed by: FAMILY MEDICINE

## 2017-07-12 PROCEDURE — 96375 TX/PRO/DX INJ NEW DRUG ADDON: CPT

## 2017-07-12 PROCEDURE — 75574 CT ANGIO HRT W/3D IMAGE: CPT

## 2017-07-12 PROCEDURE — 80061 LIPID PANEL: CPT | Performed by: FAMILY MEDICINE

## 2017-07-12 PROCEDURE — 99243 OFF/OP CNSLTJ NEW/EST LOW 30: CPT | Performed by: PSYCHIATRY & NEUROLOGY

## 2017-07-12 PROCEDURE — 84484 ASSAY OF TROPONIN QUANT: CPT | Performed by: INTERNAL MEDICINE

## 2017-07-12 PROCEDURE — 82962 GLUCOSE BLOOD TEST: CPT

## 2017-07-12 PROCEDURE — 0 IOPAMIDOL PER 1 ML: Performed by: FAMILY MEDICINE

## 2017-07-12 RX ORDER — METOPROLOL TARTRATE 5 MG/5ML
INJECTION INTRAVENOUS
Status: COMPLETED | OUTPATIENT
Start: 2017-07-12 | End: 2017-07-12

## 2017-07-12 RX ORDER — ALPRAZOLAM 1 MG/1
1 TABLET ORAL 2 TIMES DAILY
Status: DISCONTINUED | OUTPATIENT
Start: 2017-07-12 | End: 2017-07-14 | Stop reason: HOSPADM

## 2017-07-12 RX ADMIN — LORAZEPAM 0.5 MG: 0.5 TABLET ORAL at 07:25

## 2017-07-12 RX ADMIN — Medication 3 ML: at 15:52

## 2017-07-12 RX ADMIN — IOPAMIDOL 90 ML: 755 INJECTION, SOLUTION INTRAVENOUS at 17:15

## 2017-07-12 RX ADMIN — LEVOTHYROXINE SODIUM 50 MCG: 50 TABLET ORAL at 06:36

## 2017-07-12 RX ADMIN — METOPROLOL TARTRATE 5 MG: 5 INJECTION INTRAVENOUS at 16:17

## 2017-07-12 RX ADMIN — ALPRAZOLAM 1 MG: 1 TABLET ORAL at 20:28

## 2017-07-12 RX ADMIN — METOPROLOL TARTRATE 25 MG: 25 TABLET ORAL at 15:08

## 2017-07-12 RX ADMIN — SERTRALINE HYDROCHLORIDE 50 MG: 50 TABLET ORAL at 15:08

## 2017-07-12 RX ADMIN — ACETYLCYSTEINE 600 MG: 100 INHALANT RESPIRATORY (INHALATION) at 05:49

## 2017-07-12 RX ADMIN — METOPROLOL TARTRATE 5 MG: 5 INJECTION INTRAVENOUS at 16:27

## 2017-07-12 RX ADMIN — ALPRAZOLAM 2 MG: 1 TABLET ORAL at 10:46

## 2017-07-12 RX ADMIN — METOPROLOL TARTRATE 5 MG: 5 INJECTION INTRAVENOUS at 16:21

## 2017-07-12 NOTE — DISCHARGE INSTR - APPOINTMENTS
August 1, 2017 1:00 PM [facility] [phone]    Please arrive at 12:30 PM for paperwork and bring SS card, ID and insurance card to appointment
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
## 2017-07-12 NOTE — CONSULTS
"Adult Nutrition  Assessment    Patient Name:  Kuldip Castaneda  YOB: 1975  MRN: [id number]  Admit Date:  7/11/2017    Assessment Date:  7/12/2017          Reason for Assessment       07/12/17 1445    Reason for Assessment    Reason For Assessment/Visit identified at risk by screening criteria    Identified At Risk By Screening Criteria MST SCORE 2+;unintentional loss of 10 lbs or more in the past 2 mos;reduced oral intake over the last month                Nutrition/Diet History       07/12/17 1445    Nutrition/Diet History    Typical Food/Fluid Intake Pt's wife during my first visit reports poor intake over the last 6 months.  \"He ate nothing yesterday\"  Pt reports that he has just cut back.  MD came into to see pt so RD left the room.  When I returned only the pt is present.  Pt claims that he works out a lot and has cut back.  He isnt't very hungry today.  He is slow to answer my questions.  Per nsg staff wife told them that pt does excessive exercising either running or walking and then showers.  Mult times during the day.  Psych has evaluated pt.  Per CNA pt hasn't eaten much at all today              Labs/Tests/Procedures/Meds       07/12/17 1448    Labs/Tests/Procedures/Meds    Labs/Tests Review Reviewed;Hgb A1C;Alb;Glucose    Medication Review Reviewed, pertinent              Estimated/Assessed Needs       07/12/17 1448    Calculation Measurements    Weight Used For Calculations 80.7 kg (178 lb)    Height Used for Calculations 1.829 m (6')    Estimated/Assessed Energy Needs    Energy Need Method Kcal/kg    kcal/kg 30    30 Kcal/Kg (kcal) 2422.2    Estimated Kcal Range  2200    Estimated/Assessed Protein Needs    Weight Used for Protein Calculation 80.7 kg (178 lb)    Protein (gm/kg) 1.0    1.0 Gm Protein (gm) 80.74    Estimated Protein Range 81    Estimated/Assessed Fluid Needs    Fluid Need Method RDA method    RDA Method (mL)  2200            Nutrition Prescription Ordered       07/12/17 " 1449    Nutrition Prescription PO    Current PO Diet Regular    Fluid Consistency Thin    Common Modifiers Cardiac;Consistent Carbohydrate;Renal            Evaluation of Received Nutrient/Fluid Intake       07/12/17 1449    PO Evaluation    Number of Days PO Intake Evaluated Insufficient Data    Number of Meals 2    % PO Intake 0-25%            Comments:  Pt admitted with panic attack and anxiety along with chest pain.  He has a hx of wt loss orf ~50#--60#.  This wt is reported to be intentional per pt but according to his wife this is questionable.  Overall poor intake and/or excessive exercising is the reason for wt loss.  Pt appears to have difficulty answering my questions.  Psych has evaluated pt.  Rd will add Glucerna to trays.  I couseled pt in adequate oral intake to maintain strength.  Rd will monitor.         Electronically signed by:  Jamila Bowling RD  07/12/17 2:54 PM

## 2017-07-12 NOTE — ED PROVIDER NOTES
Subjective   Patient is a 41 y.o. male presenting with chest pain.   History provided by:  Patient   used: No    Chest Pain   Pain location:  Substernal area  Pain quality: tightness    Pain quality: not aching, not burning, not crushing, not dull, not hot, no pressure, not radiating, not sharp, not shooting, not stabbing, not tearing and not throbbing    Pain radiates to:  Does not radiate  Pain severity:  Mild  Onset quality:  Sudden  Duration:  3 days  Timing:  Constant  Progression:  Worsening  Chronicity:  Recurrent  Context: stress    Context: not breathing, not drug use, not eating, not intercourse, not lifting, not movement, not raising an arm, not at rest and not trauma    Relieved by:  Nothing  Worsened by:  Nothing  Ineffective treatments:  None tried  Associated symptoms: anxiety    Associated symptoms: no abdominal pain, no altered mental status, no anorexia, no back pain, no claudication, no cough, no diaphoresis, no dizziness, no dysphagia, no fatigue, no fever, no headache, no heartburn, no lower extremity edema, no nausea, no near-syncope, no numbness, no orthopnea, no palpitations, no PND, no shortness of breath, no syncope, no vomiting and no weakness    Risk factors: no aortic disease, no birth control, no coronary artery disease, no diabetes mellitus, no Vicki-Danlos syndrome, no high cholesterol, no hypertension, no immobilization, not male, no Marfan's syndrome, not obese, not pregnant, no prior DVT/PE, no smoking and no surgery        Review of Systems   Constitutional: Negative.  Negative for diaphoresis, fatigue and fever.   HENT: Negative.  Negative for trouble swallowing.    Eyes: Negative.    Respiratory: Negative.  Negative for cough and shortness of breath.    Cardiovascular: Positive for chest pain. Negative for palpitations, orthopnea, claudication, leg swelling, syncope, PND and near-syncope.   Gastrointestinal: Negative.  Negative for abdominal pain, anorexia,  heartburn, nausea and vomiting.   Endocrine: Negative.    Genitourinary: Negative.    Musculoskeletal: Negative.  Negative for back pain.   Skin: Negative.    Allergic/Immunologic: Negative.    Neurological: Negative for dizziness, seizures, syncope, facial asymmetry, speech difficulty, weakness, light-headedness, numbness and headaches.   Hematological: Negative.    Psychiatric/Behavioral: Positive for agitation. The patient is nervous/anxious.        Past Medical History:   Diagnosis Date   • Anxiety    • Biliary dyskinesia    • Blood in feces         • Chest pain    • Chronic cholecystitis without calculus     postoperative      • Depressive disorder    • Epigastric pain    • Essential hypertension    • Gastro-esophageal reflux disease with esophagitis    • Generalized anxiety disorder    • Genital warts     large left groin      • Glaucoma suspect     suspicious disc cupping      • Hashimoto's thyroiditis    • Hematochezia    • History of echocardiogram 01/15/2016    Mild concentric LV hypertrophy with normal left atrial and aortic root size. LV systolic function is overall well preserved with EF 55-60%. Mitral valve mildly thickened with adequate opening.   • Hypercholesterolemia    • Hyperlipemia    • Hypertensive disorder    • Lipoma of anterior chest wall     left   • Major depressive disorder    • Microscopic hematuria    • Morbid obesity    • Multiple acquired skin tags     in groin   • Nausea    • Nausea and vomiting    • Obesity    • Pain in pelvis    • Painful urging to urinate    • Panic disorder    • Right upper quadrant pain    • Transient visual loss     resolved, prob BS elevation      • Type 2 diabetes mellitus     not on medications at this time    • Visual disturbance    • Vitamin D deficiency        No Known Allergies    Past Surgical History:   Procedure Laterality Date   • CARDIAC CATHETERIZATION  01/20/2016    No evidence of any obstructive epicardial CAD. Preserved LV systolic function with  EF 55%.   • CARDIAC CATHETERIZATION N/A 7/14/2017    Procedure: Left Heart Cath;  Surgeon: Dirk Dawson MD;  Location: [facility] CATH INVASIVE LOCATION;  Service:    • COLONOSCOPY  09/27/2016   • ENDOSCOPY N/A 5/24/2017    Procedure: ESOPHAGOGASTRODUODENOSCOPY;  Surgeon: Mitul Campbell MD;  Location: [facility] ENDOSCOPY;  Service:    • INJECTION OF MEDICATION  01/12/2016    Zofran (Nausea with vomiting, unspecified)    • LAPAROSCOPIC CHOLECYSTECTOMY  01/26/2015    With attempted intraoperative cholangiogram. Transversus abdominis preperitoneal block.   • LIPOMA EXCISION  07/06/2015    Excision of 5 cm left chest lipoma. Excision of 4 cm left groin skin lesion.   • LUMBAR DISC SURGERY  2012   • ORIF ANKLE FRACTURE  03/31/2016    Open reduction and internal fixation of right bimalleolar ankle fracture, placement of short leg splint and radiographic evaluation of fracture for reduction and placement of fixation   • UPPER GASTROINTESTINAL ENDOSCOPY  05/24/2017       Family History   Problem Relation Age of Onset   • No Known Problems Mother    • Schizophrenia Father      or Bipolar Disorder, admitted to [facility]   • Heart attack Father                Objective   Physical Exam   Constitutional: He is oriented to person, place, and time. He appears well-developed and well-nourished. No distress.   HENT:   Head: Normocephalic and atraumatic.   Mouth/Throat: No oropharyngeal exudate.   Eyes: Conjunctivae and EOM are normal. Pupils are equal, round, and reactive to light.   Neck: Normal range of motion. Neck supple. No JVD present. No tracheal deviation present. No thyromegaly present.   Cardiovascular: Normal rate, regular rhythm and normal heart sounds.  Exam reveals no gallop and no friction rub.    No murmur heard.  Pulmonary/Chest: Effort normal and breath sounds normal. No stridor. No respiratory distress. He has no wheezes. He has no rales. He exhibits no tenderness.   Abdominal: Soft. Bowel sounds are normal. He  exhibits no distension and no mass. There is no tenderness. There is no rebound and no guarding. No hernia.   Musculoskeletal: Normal range of motion. He exhibits no edema, tenderness or deformity.   Lymphadenopathy:     He has no cervical adenopathy.   Neurological: He is alert and oriented to person, place, and time. He has normal reflexes. He displays normal reflexes. No cranial nerve deficit. He exhibits normal muscle tone. Coordination normal.   Skin: Skin is warm and dry. No rash noted. He is not diaphoretic. No erythema. No pallor.   Psychiatric: Judgment and thought content normal. His mood appears anxious. His affect is not angry, not blunt, not labile and not inappropriate. His speech is not rapid and/or pressured, not delayed, not tangential and not slurred. He is agitated. He is not aggressive, not hyperactive, not slowed, not withdrawn and not combative. Thought content is not paranoid and not delusional. Cognition and memory are normal. He does not express impulsivity or inappropriate judgment. He does not exhibit a depressed mood. He expresses no homicidal and no suicidal ideation. He expresses no suicidal plans and no homicidal plans. He is communicative.   Nursing note and vitals reviewed.      Procedures         ED Course  ED Course      Labs Reviewed   COMPREHENSIVE METABOLIC PANEL - Abnormal; Notable for the following:        Result Value    Glucose 145 (*)     Total Protein 8.9 (*)     Albumin 4.90 (*)     Globulin 4.0 (*)     Anion Gap 20.0 (*)     All other components within normal limits   URINALYSIS W/ CULTURE IF INDICATED - Abnormal; Notable for the following:     Ketones, UA 80 mg/dL (3+) (*)     Bilirubin, UA Moderate (2+) (*)     Blood, UA Small (1+) (*)     Protein, UA >=300 mg/dL (3+) (*)     All other components within normal limits   TROPONIN (IN-HOUSE) - Abnormal; Notable for the following:     Troponin I 0.064 (*)     All other components within normal limits   URINE DRUG SCREEN -  Abnormal; Notable for the following:     Benzodiazepine Screen, Urine Positive (*)     THC, Screen, Urine Positive (*)     All other components within normal limits    Narrative:     Negative Thresholds For Drugs Screened in Urine:     Amphetamines          500 ng/ml  Barbiturates          200 ng/ml  Benzodiazepines       200 ng/ml  Cocaine               150 ng/ml  Methadone             300 ng/mL  Opiates               300 ng/mL  Oxycodone             100 ng/mL  THC                   20 ng/mL    The normal value for all drugs tested is negative. This report includes final unconfirmed screening results.  A positive result by this assay can be, at your request, sent to the Reference Lab for confirmation by gas chromatography. Unconfirmed results must not be used for non-medical purposes, such as employment or legal testing. Clinical consideration should be applied to any drug of abuse test result, particularly when unconfirmed results are used.   URINALYSIS, MICROSCOPIC ONLY - Abnormal; Notable for the following:     RBC, UA 6-12 (*)     All other components within normal limits   TROPONIN (IN-HOUSE) - Abnormal; Notable for the following:     Troponin I 0.060 (*)     All other components within normal limits   TROPONIN (IN-HOUSE) - Abnormal; Notable for the following:     Troponin I 0.064 (*)     All other components within normal limits   CK - Abnormal; Notable for the following:     Creatine Kinase 343 (*)     All other components within normal limits   TROPONIN (IN-HOUSE) - Abnormal; Notable for the following:     Troponin I 0.051 (*)     All other components within normal limits   CBC (NO DIFF) - Abnormal; Notable for the following:     RDW-SD 44.0 (*)     All other components within normal limits   HEMOGLOBIN A1C - Abnormal; Notable for the following:     Hemoglobin A1C 6.02 (*)     All other components within normal limits   LIPID PANEL - Abnormal; Notable for the following:     HDL Cholesterol 31 (*)     LDL  Cholesterol  142 (*)     LDL/HDL Ratio 4.79 (*)     All other components within normal limits   COMPREHENSIVE METABOLIC PANEL - Abnormal; Notable for the following:     Glucose 105 (*)     All other components within normal limits   TROPONIN (IN-HOUSE) - Abnormal; Notable for the following:     Troponin I 0.053 (*)     All other components within normal limits   TROPONIN (IN-HOUSE) - Abnormal; Notable for the following:     Troponin I 0.045 (*)     All other components within normal limits   LIPID PANEL - Abnormal; Notable for the following:     HDL Cholesterol 33 (*)     LDL Cholesterol  141 (*)     LDL/HDL Ratio 4.44 (*)     All other components within normal limits   TSH - Normal   BNP (IN-HOUSE) - Normal   CK MB - Normal   POCT GLUCOSE FINGERSTICK - Normal   POCT GLUCOSE FINGERSTICK - Normal   POCT GLUCOSE FINGERSTICK - Normal   POCT GLUCOSE FINGERSTICK - Normal   POCT GLUCOSE FINGERSTICK - Normal   POCT GLUCOSE FINGERSTICK - Normal   POCT GLUCOSE FINGERSTICK - Normal   POCT GLUCOSE FINGERSTICK - Normal   POCT GLUCOSE FINGERSTICK - Normal   POCT GLUCOSE FINGERSTICK - Normal   RAINBOW DRAW    Narrative:     The following orders were created for panel order [place] Draw.  Procedure                               Abnormality         Status                     ---------                               -----------         ------                     Light Blue Top[[id number]]                                   Final result                 Please view results for these tests on the individual orders.   POCT GLUCOSE FINGERSTICK   POCT GLUCOSE FINGERSTICK   POCT GLUCOSE FINGERSTICK   POCT GLUCOSE FINGERSTICK   POCT GLUCOSE FINGERSTICK   POCT GLUCOSE FINGERSTICK   POCT GLUCOSE FINGERSTICK   LIGHT BLUE TOP   EXTRA TUBES    Narrative:     The following orders were created for panel order Extra Tubes.  Procedure                               Abnormality         Status                     ---------                                -----------         ------                     Lavender Top[[id number]]                                     Final result                 Please view results for these tests on the individual orders.   [facility] TOP     Ct Head Without Contrast    Result Date: 7/11/2017  Narrative: PROCEDURE: CT head without contrast REASON FOR EXAM: altered mental status This exam was performed according to our departmental dose-optimization program, which includes automated exposure control, adjustment of the mA and/or kV according to patient size and/or use of iterative reconstruction technique. FINDINGS: No comparison. Axial computer tomography sequential imaging of the head was performed from the vertex to the base of the skull. .Sagittal and coronal reformation was performed . The skull vault is intact. Paranasal sinuses and bilateral mastoid air cells are well aerated. Cerebral and cerebellar parenchymal are normal. Ventricular system and subarachnoid spaces are normal.     Impression: Normal CT of the head. Electronically signed by:  Solis Hernández MD  7/11/2017 12:51 PM CDT Workstation: [id number]    Xr Chest 1 View    Result Date: 7/11/2017  Narrative: Chest single view CLINICAL INDICATION: Alteration mental status. COMPARISON: Chest July 26, 2016. FINDINGS: Cardiac silhouette is normal in size. Pulmonary vascularity is unremarkable. No focal infiltrate or consolidation.  No pleural effusion.  No pneumothorax.     Impression: CONCLUSION: No evidence of active disease. Electronically signed by:  Jossue Garcia MD  7/11/2017 11:16 AM CDT Workstation: [id number]    Ct Angiogram Coronary    Result Date: 7/12/2017  Narrative: Procedure: CT angiogram coronary with contrast CLINICAL HISTORY: Chest pain COMPARISON: None. Post processing was performed by the radiologist at the [employer]soft workstation. Serial axial CT images were obtained through the heart at 3 mm thickness without contrast for calcium scoring. 3D images including vessel  probing technique were also obtained. Subsequently, following the intravenous administration of 90 ml of Isovue-370, serial axial CT images were obtained through the heart at 0.6 mm thickness utilizing retrospective gating. Images were obtained after premedication with 50 mg of metoprolol by IV. Full field of view reconstructed images were used for evaluation of the extracardiac tissues. CALCIUM PLAQUE BURDEN: REGION                                         CALCIUM SCORE ([place]) Left Main                                                      0 Right Coronary Artery                                 72 Left Anterior Descending                            0 Circumflex                                                    0 Posterior Descending Artery                       72      Your Calcium Score is 72  This places the patent in the mild or minimal coronary narrowings likely risk for coronary artery disease. CTA OF THE CORONARY ARTERIES: There is suboptimal visualization of the left main, LAD, diagonals, circumflex, and RCA. The patient is right dominant. Left Main: No calcified or soft itssue density plaque and no stenosis. LAD: No calcified or soft tissue density plaque and no stenosis. Circumflex: No calcified or soft tissue density plaque and no stenosis. RCA: Evaluation is severely limited secondary to variation in heart rate and motion. Proximal RCA moderate narrowing of 50-70%. Mid and distal RCA focal regions of stenoses which appears significant 70% or greater. The aortic valve is tricuspid. There is no myocardial bridging. On short axis views, the myocardium is homogeneous in thickness. EXTRACARDIAC SOFT TISSUES: Mediastinum: On the imaging, the ascending and descending aorta are normal in caliber. There is no mediastinal or hilar lymphadenopathy. The pericardium is normal. Lungs: No consolidation or focal nodules are present. There is no pneumothorax or effusion. The pulmonary arteries are normal in  appearance. Abdomen: No evidence of hiatal hernia. The imaged liver and spleen are unremarkable. There is no lymphadenopathy in the upper abdomen. Bones: There are no lytic or blastic lesions within the osseous structures. CT FUNCTIONAL ANALYSIS: Ejection Fraction     75 % Diastolic Volume     104 ml Systolic Volume      26 ml Stroke Volume        78 ml Cardiac Output       5.7 L/minute     Impression: CONCLUSION: 1.  Patient has calcium score 72 which puts him in the mild to minimal coronary narrowings likely for coronary artery disease. 2.  CT coronary angiogram study limited due to patient motion and variable heart rate. 3.  Proximal RCA moderate narrowing of 50-70%. Mid and distal RCA focal regions of stenoses which appears significant 70% or greater. Evaluation however is again limited secondary to patient motion and variable heart rate. Electronically signed by:  Solis Hernández MD  7/12/2017 5:59 PM CDT Workstation: [id number]                  MDM  Number of Diagnoses or Management Options  Chest pain, unspecified type: minor     Amount and/or Complexity of Data Reviewed  Clinical lab tests: reviewed  Tests in the medicine section of CPT®: reviewed    Risk of Complications, Morbidity, and/or Mortality  Presenting problems: low  Diagnostic procedures: low  Management options: low    Critical Care  Total time providing critical care: < 30 minutes    Patient Progress  Patient progress: stable      Final diagnoses:   Chest pain, unspecified type            SIMONE Napoles  07/12/17 1609       SIMONE Napoles  07/12/17 1902       SIMONE Napoles  07/25/17 1431

## 2017-07-12 NOTE — PLAN OF CARE
Problem: Patient Care Overview (Adult)  Goal: Plan of Care Review  Outcome: Ongoing (interventions implemented as appropriate)    07/12/17 0459   Coping/Psychosocial Response Interventions   Plan Of Care Reviewed With patient;spouse   Patient Care Overview   Progress improving   Outcome Evaluation   Outcome Summary/Follow up Plan Pt has not c/o any pain this shift. Pt is oriented to person, place, but not sure what day it is. Spouse reports episodes of confusion, inability to focus, wandering, and anxiety. Previous nurse reports pt wanting to leave AMA, however this shift pt has been calm and cooperative. He is slow to respond to quesitons and requests to do things, but for the most part answers appropriately. Cardiology has seen and is waiting on additional troponins to decide if cath is needed. Most recent troponin is still elevated but improved from previous. Waiting on consult to psych to respond this am. Please see previous nursing note for summary of discussion with spouse regarding events leaing up to admission and family hx.       Goal: Adult Individualization and Mutuality  Outcome: Ongoing (interventions implemented as appropriate)  Goal: Discharge Needs Assessment  Outcome: Ongoing (interventions implemented as appropriate)    Problem: Acute Coronary Syndrome (ACS) (Adult)  Goal: Signs and Symptoms of Listed Potential Problems Will be Absent or Manageable (Acute Coronary Syndrome)  Outcome: Ongoing (interventions implemented as appropriate)
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
## 2017-07-13 LAB
GLUCOSE BLDC GLUCOMTR-MCNC: 84 MG/DL (ref 70–130)
GLUCOSE BLDC GLUCOMTR-MCNC: 85 MG/DL (ref 70–130)
GLUCOSE BLDC GLUCOMTR-MCNC: 88 MG/DL (ref 70–130)
GLUCOSE BLDC GLUCOMTR-MCNC: 88 MG/DL (ref 70–130)

## 2017-07-13 PROCEDURE — G0378 HOSPITAL OBSERVATION PER HR: HCPCS

## 2017-07-13 PROCEDURE — 82962 GLUCOSE BLOOD TEST: CPT

## 2017-07-13 RX ORDER — SODIUM CHLORIDE 0.9 % (FLUSH) 0.9 %
1-10 SYRINGE (ML) INJECTION AS NEEDED
Status: CANCELLED | OUTPATIENT
Start: 2017-07-14

## 2017-07-13 RX ORDER — ASPIRIN 325 MG
325 TABLET ORAL DAILY
Status: DISCONTINUED | OUTPATIENT
Start: 2017-07-13 | End: 2017-07-14 | Stop reason: HOSPADM

## 2017-07-13 RX ADMIN — ALPRAZOLAM 1 MG: 1 TABLET ORAL at 17:32

## 2017-07-13 RX ADMIN — ASPIRIN 325 MG: 325 TABLET, COATED ORAL at 12:18

## 2017-07-13 RX ADMIN — LORAZEPAM 0.5 MG: 0.5 TABLET ORAL at 23:04

## 2017-07-13 RX ADMIN — LEVOTHYROXINE SODIUM 50 MCG: 50 TABLET ORAL at 07:00

## 2017-07-13 RX ADMIN — ALPRAZOLAM 1 MG: 1 TABLET ORAL at 08:16

## 2017-07-13 RX ADMIN — SERTRALINE HYDROCHLORIDE 50 MG: 50 TABLET ORAL at 08:16

## 2017-07-13 NOTE — PLAN OF CARE
Problem: Patient Care Overview (Adult)  Goal: Plan of Care Review  Outcome: Ongoing (interventions implemented as appropriate)    07/13/17 0502   Coping/Psychosocial Response Interventions   Plan Of Care Reviewed With patient   Patient Care Overview   Progress progress toward functional goals is gradual   Outcome Evaluation   Outcome Summary/Follow up Plan no c/o pain this shift. He has exhibited restlessness, pacing the room and fidgeting. Taking medication this shift without having to be talked into it. He has been cooperative and although still saying very little, he is speaking more to nursing staff than he did the previous night. Pt watched tv the first half of the shift, which is an improvement from the previous night when he just sat in his room in the dark. Dr. Hedrick saw pt yesterday and adjusted medications after pt was offered inpatient psych care, but refused. Case management consulted to set up outpatient psychiatry follow-up.       Goal: Adult Individualization and Mutuality  Outcome: Ongoing (interventions implemented as appropriate)  Goal: Discharge Needs Assessment  Outcome: Ongoing (interventions implemented as appropriate)    Problem: Acute Coronary Syndrome (ACS) (Adult)  Goal: Signs and Symptoms of Listed Potential Problems Will be Absent or Manageable (Acute Coronary Syndrome)  Outcome: Ongoing (interventions implemented as appropriate)    Problem: Anxiety (Adult)  Goal: Identify Related Risk Factors and Signs and Symptoms  Outcome: Outcome(s) achieved Date Met:  07/13/17  Goal: Reduction/Resolution  Outcome: Ongoing (interventions implemented as appropriate)

## 2017-07-13 NOTE — PROGRESS NOTES
[facility] Medicine Services  INPATIENT PROGRESS NOTE    Length of Stay: 0  Date of Admission: 7/11/2017  Primary Care Physician: Aura Carrillo MD    Subjective   Chief Complaint: Chest pain  HPI:  41 year old male who presented with a complaint of panic attack and chest pain.  He has been evaluated by psych who recommended Zoloft and outpatient mental health follow-up.  Cardiology evaluated and recommended CTA coronary which reveals suspected RCA lesion.  Cardiology considering coronary angiogram.     Review of Systems   Constitutional: Negative for chills and fever.   Respiratory: Negative for shortness of breath.    Cardiovascular: Positive for chest pain. Negative for palpitations.   Gastrointestinal: Negative for abdominal pain, diarrhea, nausea and vomiting.   Psychiatric/Behavioral: The patient is nervous/anxious.       All pertinent negatives and positives are as above. All other systems have been reviewed and are negative unless otherwise stated.     Objective    Temp:  [95.7 °F (35.4 °C)-98.5 °F (36.9 °C)] 98 °F (36.7 °C)  Heart Rate:  [65-84] 75  Resp:  [18-20] 18  BP: (130-166)/(72-95) 142/95    Physical Exam   Constitutional: He is oriented to person, place, and time. He appears well-developed and well-nourished.   Cardiovascular: Normal rate, regular rhythm, normal heart sounds and intact distal pulses.    Pulmonary/Chest: Effort normal and breath sounds normal.   Abdominal: Soft. Bowel sounds are normal. He exhibits no distension. There is no tenderness.   Musculoskeletal: Normal range of motion.   Neurological: He is alert and oriented to person, place, and time.   Skin: Skin is warm and dry.   Psychiatric: His affect is blunt.   Vitals reviewed.    Results Review:  I have reviewed the labs, radiology results, and diagnostic studies.    Laboratory Data:     Results from last 7 days  Lab Units 07/12/17  0608 07/11/17  0943   SODIUM mmol/L 138 140    POTASSIUM mmol/L 3.6 3.8   CHLORIDE mmol/L 101 98   CO2 mmol/L 24.0 22.0   BUN mg/dL 12 13   CREATININE mg/dL 1.13 1.21   GLUCOSE mg/dL 105* 145*   CALCIUM mg/dL 9.2 10.1   BILIRUBIN mg/dL 1.3 1.2   ALK PHOS U/L 52 69   ALT (SGPT) U/L 38 36   AST (SGOT) U/L 32 29   ANION GAP mmol/L 13.0 20.0*     Estimated Creatinine Clearance: 117.5 mL/min (by C-G formula based on Cr of 1.13).            Results from last 7 days  Lab Units 07/12/17  0608   WBC 10*3/mm3 3.70   HEMOGLOBIN g/dL 14.1   HEMATOCRIT % 40.8   PLATELETS 10*3/mm3 219           Culture Data:   No results found for: BLOODCX  No results found for: URINECX  No results found for: RESPCX  No results found for: WOUNDCX  No results found for: STOOLCX  No components found for: BODYFLD    Radiology Data:   Imaging Results (last 24 hours)     Procedure Component Value Units Date/Time    CT Angiogram Coronary [[id number]] Collected:  07/12/17 1621     Updated:  07/12/17 1800    Narrative:       Procedure: CT angiogram coronary with contrast      CLINICAL HISTORY: Chest pain    COMPARISON: None.    Post processing was performed by the radiologist at the [employer]  workstation. Serial axial CT images were obtained through the  heart at 3 mm thickness without contrast for calcium scoring. 3D  images including vessel probing technique were also obtained.  Subsequently, following the intravenous administration of 90 ml  of Isovue-370, serial axial CT images were obtained through the  heart at 0.6 mm thickness utilizing retrospective gating. Images  were obtained after premedication with 50 mg of metoprolol by IV.  Full field of view reconstructed images were used for evaluation  of the extracardiac tissues.    CALCIUM PLAQUE BURDEN:    REGION                                         CALCIUM SCORE  (Agatston)  Left Main                                                      0   Right Coronary Artery                                 72   Left Anterior Descending                             0   Circumflex                                                    0   Posterior Descending Artery                       72          Your Calcium Score is 72      This places the patent in the mild or minimal coronary narrowings  likely risk for coronary artery disease.    CTA OF THE CORONARY ARTERIES: There is suboptimal visualization  of the left main, LAD, diagonals, circumflex, and RCA. The  patient is right dominant.    Left Main: No calcified or soft itssue density plaque and no  stenosis.  LAD: No calcified or soft tissue density plaque and no stenosis.  Circumflex: No calcified or soft tissue density plaque and no  stenosis.  RCA: Evaluation is severely limited secondary to variation in   heart rate and motion. Proximal RCA moderate narrowing of 50-70%.  Mid and distal RCA focal regions of stenoses which appears  significant 70% or greater.    The aortic valve is tricuspid. There is no myocardial bridging.  On short axis views, the myocardium is homogeneous in thickness.    EXTRACARDIAC SOFT TISSUES:  Mediastinum: On the imaging, the ascending and descending aorta  are normal in caliber. There is no mediastinal or hilar  lymphadenopathy. The pericardium is normal.  Lungs: No consolidation or focal nodules are present. There is no  pneumothorax or effusion. The pulmonary arteries are normal in  appearance.  Abdomen: No evidence of hiatal hernia. The imaged liver and  spleen are unremarkable. There is no lymphadenopathy in the upper  abdomen.  Bones: There are no lytic or blastic lesions within the osseous  structures.    CT FUNCTIONAL ANALYSIS:  Ejection Fraction     75 %  Diastolic Volume     104 ml  Systolic Volume      26 ml  Stroke Volume        78 ml  Cardiac Output       5.7 L/minute      Impression:       CONCLUSION:  1.  Patient has calcium score 72 which puts him in the mild to  minimal coronary narrowings likely for coronary artery disease.  2.  CT coronary angiogram study limited due to  patient motion and  variable heart rate.  3.  Proximal RCA moderate narrowing of 50-70%. Mid and distal RCA  focal regions of stenoses which appears significant 70% or  greater. Evaluation however is again limited secondary to patient  motion and variable heart rate.    Electronically signed by:  Solis Hernández MD  7/12/2017 5:59 PM CDT  Workstation: [id number]          I have reviewed the patient current medications.     Assessment/Plan     Hospital Problem List     * (Principal)Chest pain    Type 2 diabetes mellitus without complications    Overview Signed 8/16/2016 10:10 AM by Aura Carrillo MD     no retinopathy            Morbid obesity    Major depressive disorder    Generalized anxiety disorder    Essential hypertension          Plan:   Await further cardiology recs  ASA  Lipid panel  Zoloft          This document has been electronically signed by SARIKA Pittman on July 13, 2017 11:10 AM

## 2017-07-13 NOTE — PLAN OF CARE
Problem: Patient Care Overview (Adult)  Goal: Plan of Care Review  Outcome: Ongoing (interventions implemented as appropriate)    07/13/17 1684   Coping/Psychosocial Response Interventions   Plan Of Care Reviewed With patient   Patient Care Overview   Progress no change   Outcome Evaluation   Outcome Summary/Follow up Plan no c/o pain. has exhibited anxiety at beginning of shift, after MD talked with patient, pt has been calm the remainder of shift. pt will have heart cath tomorrow, NPO after midnight.. will continue to monitor.        Goal: Adult Individualization and Mutuality  Outcome: Ongoing (interventions implemented as appropriate)    Problem: Acute Coronary Syndrome (ACS) (Adult)  Goal: Signs and Symptoms of Listed Potential Problems Will be Absent or Manageable (Acute Coronary Syndrome)  Outcome: Ongoing (interventions implemented as appropriate)    Problem: Anxiety (Adult)  Goal: Reduction/Resolution  Outcome: Ongoing (interventions implemented as appropriate)

## 2017-07-14 VITALS
HEART RATE: 71 BPM | RESPIRATION RATE: 18 BRPM | BODY MASS INDEX: 37.25 KG/M2 | OXYGEN SATURATION: 95 % | HEIGHT: 72 IN | WEIGHT: 275 LBS | DIASTOLIC BLOOD PRESSURE: 88 MMHG | SYSTOLIC BLOOD PRESSURE: 138 MMHG | TEMPERATURE: 98.5 F

## 2017-07-14 LAB
ARTICHOKE IGE QN: 141 MG/DL (ref 1–129)
CHOLEST SERPL-MCNC: 197 MG/DL (ref 0–199)
GLUCOSE BLDC GLUCOMTR-MCNC: 89 MG/DL (ref 70–130)
HDLC SERPL-MCNC: 33 MG/DL (ref 60–200)
LDLC/HDLC SERPL: 4.44 {RATIO} (ref 0–3.55)
TRIGL SERPL-MCNC: 87 MG/DL (ref 20–199)
WHOLE BLOOD HOLD SPECIMEN: NORMAL

## 2017-07-14 PROCEDURE — 25010000002 HYDROMORPHONE PER 4 MG: Performed by: INTERNAL MEDICINE

## 2017-07-14 PROCEDURE — 0 IOPAMIDOL PER 1 ML: Performed by: INTERNAL MEDICINE

## 2017-07-14 PROCEDURE — G0378 HOSPITAL OBSERVATION PER HR: HCPCS

## 2017-07-14 PROCEDURE — C1894 INTRO/SHEATH, NON-LASER: HCPCS | Performed by: INTERNAL MEDICINE

## 2017-07-14 PROCEDURE — 80061 LIPID PANEL: CPT | Performed by: NURSE PRACTITIONER

## 2017-07-14 PROCEDURE — C1760 CLOSURE DEV, VASC: HCPCS | Performed by: INTERNAL MEDICINE

## 2017-07-14 PROCEDURE — C1769 GUIDE WIRE: HCPCS | Performed by: INTERNAL MEDICINE

## 2017-07-14 PROCEDURE — 25010000002 MIDAZOLAM PER 1 MG: Performed by: INTERNAL MEDICINE

## 2017-07-14 PROCEDURE — 82962 GLUCOSE BLOOD TEST: CPT

## 2017-07-14 PROCEDURE — 93458 L HRT ARTERY/VENTRICLE ANGIO: CPT | Performed by: INTERNAL MEDICINE

## 2017-07-14 RX ORDER — LIDOCAINE HYDROCHLORIDE 20 MG/ML
INJECTION, SOLUTION INFILTRATION; PERINEURAL AS NEEDED
Status: DISCONTINUED | OUTPATIENT
Start: 2017-07-14 | End: 2017-07-14 | Stop reason: HOSPADM

## 2017-07-14 RX ORDER — MIDAZOLAM HYDROCHLORIDE 1 MG/ML
INJECTION INTRAMUSCULAR; INTRAVENOUS AS NEEDED
Status: DISCONTINUED | OUTPATIENT
Start: 2017-07-14 | End: 2017-07-14 | Stop reason: HOSPADM

## 2017-07-14 RX ORDER — SODIUM CHLORIDE 9 MG/ML
INJECTION, SOLUTION INTRAVENOUS CONTINUOUS PRN
Status: DISCONTINUED | OUTPATIENT
Start: 2017-07-14 | End: 2017-07-14 | Stop reason: HOSPADM

## 2017-07-14 RX ORDER — PANTOPRAZOLE SODIUM 40 MG/1
40 TABLET, DELAYED RELEASE ORAL DAILY
Qty: 30 TABLET | Refills: 0 | Status: SHIPPED | OUTPATIENT
Start: 2017-07-14 | End: 2017-09-05

## 2017-07-14 RX ORDER — SODIUM CHLORIDE 9 MG/ML
100 INJECTION, SOLUTION INTRAVENOUS CONTINUOUS
Status: DISCONTINUED | OUTPATIENT
Start: 2017-07-14 | End: 2017-07-14 | Stop reason: HOSPADM

## 2017-07-14 RX ADMIN — ALPRAZOLAM 1 MG: 1 TABLET ORAL at 08:06

## 2017-07-14 RX ADMIN — SERTRALINE HYDROCHLORIDE 50 MG: 50 TABLET ORAL at 08:07

## 2017-07-14 RX ADMIN — ASPIRIN 325 MG: 325 TABLET, COATED ORAL at 08:06

## 2017-07-14 NOTE — PROGRESS NOTES
Cardiology Progress Note:     LOS: 0 days   Patient Care Team:  Aura Carrillo MD as PCP - General  Lashawn Echavarria as Technician      Subjective:   Chart reviewed , patient seen and examined. Patient denies any chest pain, shortness of breath palpitation.: CT angiogram coronary revealed proximal RCA moderate narrowing of 50-70%. Mid and distal RCA  focal regions of stenoses which appears significant 70% or greater.  Finding of the CT angiogram were discussed with the patient.  Patient has not had any recurrent symptoms of severe substernal chest pain.  Patient denies any symptoms of palpitation.              Objective:     Objective:  Vitals:    07/13/17 2340   BP:    Pulse: 64   Resp:    Temp:    SpO2:        Intake/Output Summary (Last 24 hours) at 07/13/17 2345  Last data filed at 07/13/17 1629   Gross per 24 hour   Intake             1020 ml   Output                0 ml   Net             1020 ml             Physical Exam:   General Appearance:    Alert, oriented, cooperative, in no acute distress   Head:    Normocephalic, atraumatic, without obvious abnormality   Eyes:           MICKEY  Lids and lashes normal, conjunctivae and sclerae normal, no icterus, no pallor   Ears:    Ears appear intact with no abnormalities noted   Throat:   Mucous membranes pink and moist   Neck:   Supple, trachea midline, no carotid bruit, no organomegaly or JVD   Lungs:     Clear to auscultation and percussion, respirations regular, even and Unlabored. No wheezes, rales, rhonchi    Heart:    Regular rhythm and normal rate, normal S1 and S2, no            murmur, no gallop, no rub, no click   Abdomen:     Soft, non-tender, non-distended, no guarding, no rebound tenderness, Normal bowel sounds in all four quadrant, no masses, liver and spleen nonpalpable,    Genitalia:    Deferred   Extremities:   Moves all extremities well, no edema, no cyanosis, no              Redness, no clubbing   Pulses:   Pulses palpable and equal bilaterally    Skin:   Moist and warm. No bleeding, bruising or rash   Neurologic/Psychiatric:   Alert and oriented to person, place, and time.  Motor, power and tone in upper and lower extremity is grossly intact.  No focal neurological deficits. Normal cognitive function. No psychomotor reaction or tangential thought. No depression, homicidal ideations and suicidal ideations            Results Review:    Lab Results (last 24 hours)     Procedure Component Value Units Date/Time    POC Glucose Fingerstick [[id number]]  (Normal) Collected:  07/13/17 0702    Specimen:  Blood Updated:  07/13/17 0714     Glucose 88 mg/dL       Meter: [id number]: [id number] GERRI MARRERO       POC Glucose Fingerstick [[id number]]  (Normal) Collected:  07/13/17 1051    Specimen:  Blood Updated:  07/13/17 1105     Glucose 88 mg/dL       Meter: [id number]: [id number] [facility]       POC Glucose Fingerstick [[id number]]  (Normal) Collected:  07/13/17 1645    Specimen:  Blood Updated:  07/13/17 1720     Glucose 85 mg/dL       Meter: [id number]: [id number] SALAMANCA GEOVANNI       POC Glucose Fingerstick [[id number]]  (Normal) Collected:  07/13/17 2134    Specimen:  Blood Updated:  07/13/17 2147     Glucose 84 mg/dL       Meter: [id number]: [id number] TERRELL SUSHIL              Medication Review:   Current Facility-Administered Medications   Medication Dose Route Frequency Provider Last Rate Last Dose   • acetaminophen (TYLENOL) tablet 650 mg  650 mg Oral Q4H PRN Saurabh Up MD       • ALPRAZolam (XANAX) tablet 1 mg  1 mg Oral BID Amaury Hedrick MD   1 mg at 07/13/17 1732   • aspirin tablet 325 mg  325 mg Oral Daily SARIKA Pittman   325 mg at 07/13/17 1218   • dextrose (D50W) solution 25 g  25 g Intravenous Q15 Min PRN Saurabh Up MD       • dextrose (GLUTOSE) oral gel 15 g  15 g Oral Q15 Min PRN Saurabh Up MD       • glucagon (human recombinant) (GLUCAGEN DIAGNOSTIC)  injection 1 mg  1 mg Subcutaneous Q15 Min PRN Saurabh Up MD       • hydrALAZINE (APRESOLINE) injection 10 mg  10 mg Intravenous Q6H PRN Saurabh Up MD       • insulin aspart (novoLOG) injection 0-14 Units  0-14 Units Subcutaneous 4x Daily AC & at Bedtime Saurabh Up MD       • levothyroxine (SYNTHROID, LEVOTHROID) tablet 50 mcg  50 mcg Oral QAM Saurabh Up MD   50 mcg at 07/13/17 0700   • LORazepam (ATIVAN) tablet 0.5 mg  0.5 mg Oral Q8H PRN Gianfranco Neal MD   0.5 mg at 07/13/17 2304   • ondansetron (ZOFRAN) injection 4 mg  4 mg Intravenous Q6H PRN Saurabh Up MD       • sertraline (ZOLOFT) tablet 50 mg  50 mg Oral Daily Amaury Hedrick MD   50 mg at 07/13/17 0816   • sodium chloride 0.9 % flush 1-10 mL  1-10 mL Intravenous PRN Saurabh Up MD       • sodium chloride 0.9 % flush 10 mL  10 mL Intravenous PRN SIMONE Napoles   3 mL at 07/12/17 1552       Assessment and Plan:    Principal Problem:    Chest pain  Active Problems:    Type 2 diabetes mellitus without complications    Morbid obesity    Major depressive disorder    Generalized anxiety disorder    Essential hypertension      1. Chest pain: CT angiogram coronary revealed proximal RCA moderate narrowing of 50-70%. Mid and distal RCA  focal regions of stenoses which appears significant 70% or greater.Patient was recommended a coronary angiogram.  Risk-benefit treatment option for the coronary angiogram were discussed with the patient and an informed consent was obtained from the patient for the coronary angiogram.  Patient Sanaz 40 score #2 patient ASA classification was #2 rest for minimal sedation was also discussed with the patient and an informed consent was obtained from the patient for the minimal sedation.  Plan was to proceed with a left heart catheterization in the morning.  2.  Arterial hypertension.  Patient blood pressure is currently labile.  Patient has been counseled to decrease his salt  intake.  At the present time would continue with the present dose of the antihypertensive medication.  3.  Obesity.  Patient has been counseled on weight reduction lifestyle modification and dietary restriction.            Dirk Dawson MD  07/13/17  11:45 PM      Time: Spent on face-to-face 25  minutes    EMR Dragon/Transcription disclaimer:   Some of this note may be an electronic transcription/translation of spoken language to printed text. The electronic translation of spoken language may permit erroneous, or at times, nonsensical words or phrases to be inadvertently transcribed; Although I have reviewed the note for such errors, some may still exist.

## 2017-07-14 NOTE — NURSING NOTE
Not cooperating with vital signs,, wrote note to nurse practitioner that has a flea in his stomach and concerned about tumor in his leg

## 2017-07-14 NOTE — CONSULTS
"Adult Nutrition  Assessment    Patient Name:  Kuldip Castaneda  YOB: 1975  MRN: [id number]  Admit Date:  7/11/2017    Assessment Date:  7/14/2017          Reason for Assessment       07/14/17 1104    Reason for Assessment    Reason For Assessment/Visit follow up protocol              Nutrition/Diet History       07/14/17 1104    Nutrition/Diet History    Typical Food/Fluid Intake Pt with a very \"flat affect\"  He did not talk very much during my conversation with him but he did voice understanding.  His wife states that he does exercise in excess.  He only ate broth and oatmeal over the last month.  counseled pt in Healthy eating              Labs/Tests/Procedures/Meds       07/14/17 1106    Labs/Tests/Procedures/Meds    Labs/Tests Review Reviewed    Medication Review Reviewed, pertinent                Nutrition Prescription Ordered       07/14/17 1106    Nutrition Prescription PO    Current PO Diet Regular    Fluid Consistency Thin    Supplement Glucerna Shake    Supplement Frequency 3 times a day    Common Modifiers Cardiac;Consistent Carbohydrate            Evaluation of Received Nutrient/Fluid Intake       07/14/17 1107    PO Evaluation    Number of Days PO Intake Evaluated 2 days    Number of Meals 3    % PO Intake 25% X 2;  75% X 1            Comments:  Pt is s/p Heart cath which according to staff was clear.  Pt still with decreased oral intake.  His wife reports that over the last month he has eaten primarily oatmeal and broth.  She states that he exercises in excess and walks up to 6 miles a day with little beverage intake.  I discussed with pt in length the need to eat properly in order to prevent muscle loss.  Heart healthy/Adequate oral po intake that includes eating 3 meals a day that includes lean meats and fruits and vegetables; drinking adequate fluid intake.  He voiced understanding but I am not sure if he will follow my recommendations.  Pt is to follow-up with behavioral health on " an outpatient basis.         Electronically signed by:  Jamila Bowling RD  07/14/17 11:11 AM

## 2017-07-14 NOTE — PLAN OF CARE
Problem: Patient Care Overview (Adult)  Goal: Plan of Care Review  Outcome: Ongoing (interventions implemented as appropriate)  Goal: Adult Individualization and Mutuality  Outcome: Ongoing (interventions implemented as appropriate)  Goal: Discharge Needs Assessment  Outcome: Ongoing (interventions implemented as appropriate)    Problem: Acute Coronary Syndrome (ACS) (Adult)  Goal: Signs and Symptoms of Listed Potential Problems Will be Absent or Manageable (Acute Coronary Syndrome)  Outcome: Ongoing (interventions implemented as appropriate)    Problem: Anxiety (Adult)  Goal: Reduction/Resolution  Outcome: Ongoing (interventions implemented as appropriate)

## 2017-07-14 NOTE — DISCHARGE SUMMARY
"    [facility] Medicine Services  DISCHARGE SUMMARY       Date of Admission: 7/11/2017  Date of Discharge:  7/14/2017  Primary Care Physician: Aura Carrillo MD    Presenting Problem/History of Present Illness:  Chest pain, unspecified type [R07.9]     Final Discharge Diagnoses:  Principal Problem:    Chest pain  Active Problems:    Type 2 diabetes mellitus without complications    Morbid obesity    Major depressive disorder    Generalized anxiety disorder    Essential hypertension      Consults:   Consults     Date and Time Order Name Status Description    7/11/2017 1810 Inpatient Consult to Psychiatrist Completed     7/11/2017 1810 Inpatient Consult to Cardiology      7/11/2017 1605 Inpatient Consult to Cardiology Completed     7/11/2017 1446 Hospitalist (on-call MD unless specified)            Procedures Performed: Procedure(s):  Left Heart Cath                HPI/Hospital Course:  The patient is a 41 y.o. male who presented to [facility] with complaints of a panic attack.  Troponin was evaluated and was elevated.  He was evaluated by cardiology who recommended CTA coronary arteries which raised concern for RCA lesion.  LHC was performed revealing no evidence of any obstructive disease.  Psychiatry was also consulted during hospitalization for severe anxiety, depression, apathy, and anhedonia.  He was evaluated by psychiatry and did not meet criteria for inpatient hospitalization.  He was recommended Zoloft and outpatient mental health follow-up, which was arranged.  The patient was also offered volentary psych hospitalization and declined.  He is stable and will be discharged to home.    Condition on Discharge:  Stable    Physical Exam on Discharge:  /88 (BP Location: Right arm, Patient Position: Lying)  Pulse 71  Temp 98.5 °F (36.9 °C)  Resp 18  Ht 72\" (182.9 cm)  Wt 275 lb (125 kg)  SpO2 95%  BMI 37.3 kg/m2  Physical Exam   Constitutional: " "He is oriented to person, place, and time. He appears well-developed and well-nourished.   HENT:   Head: Normocephalic.   Cardiovascular: Normal rate, regular rhythm, normal heart sounds and intact distal pulses.    Pulmonary/Chest: Effort normal and breath sounds normal. No respiratory distress.   Abdominal: Soft. Bowel sounds are normal. He exhibits no distension. There is no tenderness.   Musculoskeletal: Normal range of motion. He exhibits no edema.   Neurological: He is alert and oriented to person, place, and time.   Skin: Skin is warm and dry.   Psychiatric:   Affect is flat.   Vitals reviewed.      Discharge Disposition:  Home or Self Care    Discharge Medications:   Leonel Kuldipbret Keita   Home Medication Instructions ZACH:[id number]    Printed on:07/14/17 1234   Medication Information                      ALPRAZolam (XANAX) 2 MG tablet  Take 1 tablet by mouth 2 (Two) Times a Day.             atorvastatin (LIPITOR) 20 MG tablet  Take 1 tablet by mouth Every Night.             B-D 3CC LUER-JUAN DIEGO SYR 25GX1/2\" 25G X 1-1/2\" 3 ML misc               cyanocobalamin 1000 MCG/ML injection  Inject 1 ml (1,000 mcg) intramuscularly every 14 days             enalapril (VASOTEC) 10 MG tablet  Take 1 tablet by mouth Daily.             hydrochlorothiazide (HYDRODIURIL) 25 MG tablet  Take 25 mg by mouth Daily.             metoprolol tartrate (LOPRESSOR) 25 MG tablet  Take 25 mg by mouth 2 (Two) Times a Day.             omeprazole (priLOSEC) 20 MG capsule  Take 1 capsule by mouth Daily.             pantoprazole (PROTONIX) 40 MG EC tablet  Take 1 tablet by mouth Daily.             sertraline (ZOLOFT) 50 MG tablet  Take 1 tablet by mouth Daily.             SYNTHROID 50 MCG tablet  Take 1 tablet by mouth Every Morning.             Syringe/Needle, Disp, (B-D 3CC LUER-JUAN DIEGO SYR 80YA2-1/2) 21G X 1-1/2\" 3 ML misc  FOR USE WITH TESTOSTERONE **RX DISP ALSO 18 GAUGE NEEDLE TO DRAW UP**             Syringe/Needle, Disp, (B-D 3CC LUER-JUAN DIEGO SYR " "82WQ8-1/2) 21G X 1-1/2\" 3 ML misc  INJECT 1 SYRINGE INTO THE BUTTOCKS MUSCLE EVERY 14 DAYS **DISPENSE 18 GAUGE NEEDLE**             Syringe/Needle, Disp, (B-D 3CC LUER-JUAN DIEGO SYR 10IK8-5/2) 22G X 1-1/2\" 3 ML misc  Inject 1 syringe into the shoulder, thigh, or buttocks Every 14 (Fourteen) Days.             Syringe/Needle, Disp, (B-D SYRINGE/NEEDLE 1CC/25GX5/8) 25G X 5/8\" 1 ML misc  FOR USE WITH VIT B-12 SHOT             Testosterone Cypionate (DEPOTESTOTERONE CYPIONATE) 200 MG/ML injection  100mg every 10 days , Provide(#3)18G,(#3)21Gneedles (#3)3mlsyringes q month             Testosterone Cypionate (DEPOTESTOTERONE CYPIONATE) 200 MG/ML injection  Inject 0.5 mL into the shoulder, thigh, or buttocks every 10 days             vitamin D (ERGOCALCIFEROL) 18442 UNITS capsule capsule  Take 1 capsule by mouth 2 (Two) Times a Week.                 Discharge Diet:   Diet Instructions     Diet: Cardiac; Thin Liquids, No Restrictions       Discharge Diet:  Cardiac   Fluid Consistency:  Thin Liquids, No Restrictions                 Activity at Discharge:   Activity Instructions     Activity as Tolerated                     Follow-up Appointments:   Future Appointments  Date Time Provider Department Center   7/20/2017 10:30 AM MD BRENDON Goss [facility] None   7/26/2017 8:20 AM MD BRENDON Cotton [facility] None       Manuel Myers, SARIKA  07/14/17  1:24 PM                "

## 2017-07-18 RX ORDER — ENALAPRIL MALEATE 10 MG/1
10 TABLET ORAL DAILY
Qty: 30 TABLET | Refills: 3 | Status: ON HOLD | OUTPATIENT
Start: 2017-07-18 | End: 2017-10-04

## 2017-07-20 ENCOUNTER — OFFICE VISIT (OUTPATIENT)
Dept: FAMILY MEDICINE CLINIC | Facility: CLINIC | Age: 42
End: 2017-07-20

## 2017-07-20 VITALS
SYSTOLIC BLOOD PRESSURE: 120 MMHG | BODY MASS INDEX: 34.95 KG/M2 | WEIGHT: 258 LBS | HEIGHT: 72 IN | DIASTOLIC BLOOD PRESSURE: 88 MMHG

## 2017-07-20 DIAGNOSIS — F43.10 PTSD (POST-TRAUMATIC STRESS DISORDER): ICD-10-CM

## 2017-07-20 DIAGNOSIS — F33.2 SEVERE EPISODE OF RECURRENT MAJOR DEPRESSIVE DISORDER, WITHOUT PSYCHOTIC FEATURES (HCC): Primary | ICD-10-CM

## 2017-07-20 PROCEDURE — 99214 OFFICE O/P EST MOD 30 MIN: CPT | Performed by: FAMILY MEDICINE

## 2017-07-20 NOTE — PROGRESS NOTES
Subjective   Kuldip Castaneda is a 41 y.o. male who presents to the office for follow-up after hospitalization.  I reviewed the discharge summary and he had a fairly extensive workup including a left heart catheter which was negative for any occlusive disease and a CT of the head.  He was kept in the hospital for a couple of days.  He was evaluated by psychiatry and the psychiatrist felt that he had very significant depression but that he was in denial about this.  He also had a lot of anxiety.  The patient does admit that he has a lot of nervousness.  His wife had found him and what sounds like basically a catatonic state where he was awake and looking at her but wouldn't answer questions or move.  He had some family stressors just prior to this.    History of Present Illness   Anxiety   Presents for follow-up visit. Symptoms include decreased concentration, depressed mood, excessive worry, irritability, muscle tension, nervous/anxious behavior, palpitations, panic and restlessness. Patient reports no chest pain, compulsions, confusion, dizziness, dry mouth, feeling of choking, hyperventilation, impotence, insomnia, malaise, nausea, obsessions, shortness of breath or suicidal ideas. Symptoms occur most days. The severity of symptoms is severe and causing significant distress. The quality of sleep is fair. Nighttime awakenings: occasional.     Compliance with medications is %.     The following portions of the patient's history were reviewed and updated as appropriate: allergies, current medications, past family history, past medical history, past social history, past surgical history and problem list.    Review of Systems   Constitutional: Negative for chills, fatigue and fever.   HENT: Negative for congestion, sneezing, sore throat and trouble swallowing.    Eyes: Negative for visual disturbance.   Respiratory: Negative for cough, chest tightness, shortness of breath and wheezing.    Cardiovascular:  Negative for chest pain, palpitations and leg swelling.   Gastrointestinal: Negative for abdominal pain, constipation, diarrhea, nausea and vomiting.   Genitourinary: Negative for dysuria, frequency and urgency.   Musculoskeletal: Positive for back pain. Negative for neck pain.   Skin: Negative for rash.   Neurological: Negative for dizziness, weakness and headaches.   Psychiatric/Behavioral: The patient is nervous/anxious.             All other systems reviewed and are negative.      Objective   Physical Exam   Constitutional: He appears well-developed and well-nourished. He is cooperative. He does not have a sickly appearance. He does not appear ill.   HENT:   Head: Normocephalic and atraumatic.   Right Ear: External ear normal.   Left Ear: External ear normal.   Nose: Nose normal.   Mouth/Throat: Oropharynx is clear and moist.   Eyes: Conjunctivae and EOM are normal. Pupils are equal, round, and reactive to light. Right eye exhibits no discharge. Left eye exhibits no discharge. No scleral icterus.   Neck: Trachea normal, normal range of motion and phonation normal. Neck supple. No thyromegaly present.       Cardiovascular: Normal rate, regular rhythm, normal heart sounds and intact distal pulses.  Exam reveals no gallop and no friction rub.    No murmur heard.  Pulmonary/Chest: Effort normal and breath sounds normal. No respiratory distress. He has no wheezes. He has no rales.   Abdominal: Soft. Normal appearance and bowel sounds are normal. He exhibits no distension. There is no tenderness. There is no rebound and no guarding.       Musculoskeletal: Normal range of motion. He exhibits no edema.        Lumbar back: He exhibits pain (chronic, rates pain a 5).       Vascular Status -  His exam exhibits right foot vasculature normal. His exam exhibits no right foot edema. His exam exhibits left foot vasculature normal. His exam exhibits no left foot edema.  Lymphadenopathy:     He has no cervical adenopathy.    Neurological: No cranial nerve deficit.   Skin: Skin is warm and dry. No rash noted.   Psychiatric: He has a normal mood and affect. His behavior is normal. Judgment and thought content normal.   Nursing note and vitals reviewed.      Assessment/Plan   Kuldip was seen today for follow-up.    Diagnoses and all orders for this visit:    Severe episode of recurrent major depressive disorder, without psychotic features    PTSD (post-traumatic stress disorder)   had a good discussion with the patient and his wife this morning.  He does exhibit signs of depression but I have long noticed symptoms of posterior med a stress disorder.  He's had a lot of stressors which have been quite severe and difficult to deal with.  He is still a bit resistant about the idea of depression but is taking the Zoloft and his wife has noted a positive change in him, stating that he is interacting with the family and spinning time with him now rather than staying alone all the time.  I encouraged him to consider counseling and he has an appointment set.  Also encouraged him to continue the Zoloft him back in one month

## 2017-08-16 RX ORDER — CYANOCOBALAMIN 1000 UG/ML
INJECTION, SOLUTION INTRAMUSCULAR; SUBCUTANEOUS
Qty: 2 ML | Refills: 3 | Status: SHIPPED | OUTPATIENT
Start: 2017-08-16 | End: 2017-10-10 | Stop reason: SDUPTHER

## 2017-09-05 ENCOUNTER — OFFICE VISIT (OUTPATIENT)
Dept: FAMILY MEDICINE CLINIC | Facility: CLINIC | Age: 42
End: 2017-09-05

## 2017-09-05 VITALS
DIASTOLIC BLOOD PRESSURE: 110 MMHG | SYSTOLIC BLOOD PRESSURE: 152 MMHG | WEIGHT: 271 LBS | HEIGHT: 72 IN | BODY MASS INDEX: 36.7 KG/M2

## 2017-09-05 DIAGNOSIS — I10 ESSENTIAL HYPERTENSION: ICD-10-CM

## 2017-09-05 DIAGNOSIS — K21.00 GASTRO-ESOPHAGEAL REFLUX DISEASE WITH ESOPHAGITIS: Primary | ICD-10-CM

## 2017-09-05 PROCEDURE — 99214 OFFICE O/P EST MOD 30 MIN: CPT | Performed by: FAMILY MEDICINE

## 2017-09-05 RX ORDER — DEXLANSOPRAZOLE 30 MG/1
30 CAPSULE, DELAYED RELEASE ORAL DAILY
Qty: 30 CAPSULE | Refills: 5 | Status: ON HOLD | OUTPATIENT
Start: 2017-09-05 | End: 2017-10-04

## 2017-09-05 NOTE — PROGRESS NOTES
Subjective   Kuldip Castaneda is a 42 y.o. male who presents to the office for discomfort and acid reflux symptoms in the mid chest.  He says it feels different than it had previous times.  He has had a heart catheter which was negative in the recent past.  He had an EGD showing moderately severe esophagitis, negative for dysplasia.  He has not been taking his PPIs as prescribed.  He denies dysphagia or coughing or choking during meals.    History of Present Illness     The following portions of the patient's history were reviewed and updated as appropriate: allergies, current medications, past family history, past medical history, past social history, past surgical history and problem list.    Review of Systems   Constitutional: Negative for chills, fatigue and fever.   HENT: Negative for congestion, sneezing, sore throat and trouble swallowing.    Eyes: Negative for visual disturbance.   Respiratory: Negative for cough, chest tightness, shortness of breath and wheezing.    Cardiovascular: Positive for chest pain. Negative for palpitations and leg swelling.   Gastrointestinal: Positive for abdominal pain. Negative for constipation, diarrhea, nausea and vomiting.   Genitourinary: Negative for dysuria, frequency and urgency.   Musculoskeletal: Positive for back pain. Negative for neck pain.   Skin: Negative for rash.   Neurological: Negative for dizziness, weakness and headaches.   Psychiatric/Behavioral: The patient is nervous/anxious.             All other systems reviewed and are negative.      Objective   Physical Exam   Constitutional: He appears well-developed and well-nourished. He is cooperative. He does not have a sickly appearance. He does not appear ill.   HENT:   Head: Normocephalic and atraumatic.   Right Ear: External ear normal.   Left Ear: External ear normal.   Nose: Nose normal.   Mouth/Throat: Oropharynx is clear and moist.   Eyes: Conjunctivae and EOM are normal. Pupils are equal, round, and  reactive to light. Right eye exhibits no discharge. Left eye exhibits no discharge. No scleral icterus.   Neck: Trachea normal, normal range of motion and phonation normal. Neck supple. No thyromegaly present.       Cardiovascular: Normal rate, regular rhythm, normal heart sounds and intact distal pulses.  Exam reveals no gallop and no friction rub.    No murmur heard.  Pulmonary/Chest: Effort normal and breath sounds normal. No respiratory distress. He has no wheezes. He has no rales.   Abdominal: Soft. Normal appearance and bowel sounds are normal. He exhibits no distension. There is no tenderness. There is no rebound and no guarding.       Musculoskeletal: Normal range of motion. He exhibits no edema.        Lumbar back: He exhibits pain (chronic, rates pain a 5).       Vascular Status -  His exam exhibits right foot vasculature normal. His exam exhibits no right foot edema. His exam exhibits left foot vasculature normal. His exam exhibits no left foot edema.  Lymphadenopathy:     He has no cervical adenopathy.   Neurological: No cranial nerve deficit.   Skin: Skin is warm and dry. No rash noted.   Psychiatric: He has a normal mood and affect. His behavior is normal. Judgment and thought content normal.   Nursing note and vitals reviewed.      Assessment/Plan   Kuldip was seen today for follow-up.    Diagnoses and all orders for this visit:    Gastro-esophageal reflux disease with esophagitis    Essential hypertension    Other orders  -     dexlansoprazole (DEXILANT) 30 MG capsule; Take 1 capsule by mouth Daily for 30 days.    Will try Dexilant for the esophagitis and reflux symptoms.  He is to see me back in 3 months.  Stressed the importance of compliance with taking the medication daily and the need for repeat EGD at some point in the future.  Blood pressure is high today, encourage medication compliance and will have the patient monitor blood pressures at home over the next few days and contact us if not  improving, seek emergency treatment for any acute neurologic changes

## 2017-09-13 RX ORDER — HYDROCHLOROTHIAZIDE 25 MG/1
25 TABLET ORAL DAILY
Qty: 30 TABLET | Refills: 5 | Status: ON HOLD | OUTPATIENT
Start: 2017-09-13 | End: 2017-10-04

## 2017-09-26 ENCOUNTER — HOSPITAL ENCOUNTER (INPATIENT)
Facility: HOSPITAL | Age: 42
LOS: 8 days | Discharge: HOME | End: 2017-10-04
Attending: PSYCHIATRY & NEUROLOGY | Admitting: PSYCHIATRY & NEUROLOGY

## 2017-09-26 ENCOUNTER — HOSPITAL ENCOUNTER (EMERGENCY)
Facility: HOSPITAL | Age: 42
Discharge: PSYCHIATRIC OR SUBSTANCE USE FACILITY | End: 2017-09-26
Attending: EMERGENCY MEDICINE | Admitting: EMERGENCY MEDICINE

## 2017-09-26 DIAGNOSIS — R45.851 SUICIDAL IDEATION: ICD-10-CM

## 2017-09-26 DIAGNOSIS — R44.3 HALLUCINATION: Primary | ICD-10-CM

## 2017-09-26 LAB
ALBUMIN SERPL-MCNC: 4.9 G/DL (ref 3.4–4.8)
ALBUMIN/GLOB SERPL: 1.3 G/DL (ref 1.1–1.8)
ALP SERPL-CCNC: 52 U/L (ref 38–126)
ALT SERPL W P-5'-P-CCNC: 29 U/L (ref 21–72)
AMPHET+METHAMPHET UR QL: NEGATIVE
ANION GAP SERPL CALCULATED.3IONS-SCNC: 15 MMOL/L (ref 5–15)
APAP SERPL-MCNC: <10 MCG/ML (ref 10–30)
AST SERPL-CCNC: 34 U/L (ref 17–59)
BACTERIA UR QL AUTO: ABNORMAL /HPF
BARBITURATES UR QL SCN: NEGATIVE
BASOPHILS # BLD AUTO: 0.03 10*3/MM3 (ref 0–0.2)
BASOPHILS NFR BLD AUTO: 0.5 % (ref 0–2)
BENZODIAZ UR QL SCN: NEGATIVE
BILIRUB SERPL-MCNC: 1.4 MG/DL (ref 0.2–1.3)
BILIRUB UR QL STRIP: NEGATIVE
BUN BLD-MCNC: 15 MG/DL (ref 7–21)
BUN/CREAT SERPL: 12.6 (ref 7–25)
CALCIUM SPEC-SCNC: 10 MG/DL (ref 8.4–10.2)
CANNABINOIDS SERPL QL: NEGATIVE
CHLORIDE SERPL-SCNC: 96 MMOL/L (ref 95–110)
CLARITY UR: CLEAR
CO2 SERPL-SCNC: 27 MMOL/L (ref 22–31)
COCAINE UR QL: NEGATIVE
COLOR UR: YELLOW
CREAT BLD-MCNC: 1.19 MG/DL (ref 0.7–1.3)
DEPRECATED RDW RBC AUTO: 43.3 FL (ref 35.1–43.9)
EOSINOPHIL # BLD AUTO: 0.01 10*3/MM3 (ref 0–0.7)
EOSINOPHIL NFR BLD AUTO: 0.2 % (ref 0–7)
ERYTHROCYTE [DISTWIDTH] IN BLOOD BY AUTOMATED COUNT: 14.1 % (ref 11.5–14.5)
ETHANOL BLD-MCNC: <10 MG/DL (ref 0–10)
ETHANOL UR QL: <0.01 %
GFR SERPL CREATININE-BSD FRML MDRD: 81 ML/MIN/1.73 (ref 63–147)
GLOBULIN UR ELPH-MCNC: 3.8 GM/DL (ref 2.3–3.5)
GLUCOSE BLD-MCNC: 128 MG/DL (ref 60–100)
GLUCOSE UR STRIP-MCNC: NEGATIVE MG/DL
HCT VFR BLD AUTO: 42.1 % (ref 39–49)
HGB BLD-MCNC: 14.7 G/DL (ref 13.7–17.3)
HGB UR QL STRIP.AUTO: ABNORMAL
HYALINE CASTS UR QL AUTO: ABNORMAL /LPF
IMM GRANULOCYTES # BLD: 0.01 10*3/MM3 (ref 0–0.02)
IMM GRANULOCYTES NFR BLD: 0.2 % (ref 0–0.5)
KETONES UR QL STRIP: NEGATIVE
LEUKOCYTE ESTERASE UR QL STRIP.AUTO: NEGATIVE
LYMPHOCYTES # BLD AUTO: 1.61 10*3/MM3 (ref 0.6–4.2)
LYMPHOCYTES NFR BLD AUTO: 27.4 % (ref 10–50)
MCH RBC QN AUTO: 29.3 PG (ref 26.5–34)
MCHC RBC AUTO-ENTMCNC: 34.9 G/DL (ref 31.5–36.3)
MCV RBC AUTO: 84 FL (ref 80–98)
METHADONE UR QL SCN: NEGATIVE
MONOCYTES # BLD AUTO: 0.63 10*3/MM3 (ref 0–0.9)
MONOCYTES NFR BLD AUTO: 10.7 % (ref 0–12)
NEUTROPHILS # BLD AUTO: 3.59 10*3/MM3 (ref 2–8.6)
NEUTROPHILS NFR BLD AUTO: 61 % (ref 37–80)
NITRITE UR QL STRIP: NEGATIVE
OPIATES UR QL: NEGATIVE
OXYCODONE UR QL SCN: NEGATIVE
PH UR STRIP.AUTO: 6.5 [PH] (ref 5–9)
PLATELET # BLD AUTO: 257 10*3/MM3 (ref 150–450)
PMV BLD AUTO: 9.5 FL (ref 8–12)
POTASSIUM BLD-SCNC: 3.5 MMOL/L (ref 3.5–5.1)
PROT SERPL-MCNC: 8.7 G/DL (ref 6.3–8.6)
PROT UR QL STRIP: ABNORMAL
RBC # BLD AUTO: 5.01 10*6/MM3 (ref 4.37–5.74)
RBC # UR: ABNORMAL /HPF
REF LAB TEST METHOD: ABNORMAL
SALICYLATES SERPL-MCNC: <1 MG/DL (ref 10–20)
SODIUM BLD-SCNC: 138 MMOL/L (ref 137–145)
SP GR UR STRIP: 1.01 (ref 1–1.03)
SQUAMOUS #/AREA URNS HPF: ABNORMAL /HPF
UROBILINOGEN UR QL STRIP: ABNORMAL
WBC NRBC COR # BLD: 5.88 10*3/MM3 (ref 3.2–9.8)
WBC UR QL AUTO: ABNORMAL /HPF
WHOLE BLOOD HOLD SPECIMEN: NORMAL

## 2017-09-26 RX ORDER — LORAZEPAM 2 MG/ML
1 INJECTION INTRAMUSCULAR ONCE
Status: COMPLETED | OUTPATIENT
Start: 2017-09-26 | End: 2017-09-26

## 2017-09-26 RX ORDER — LABETALOL 100 MG/1
100 TABLET, FILM COATED ORAL ONCE
Status: COMPLETED | OUTPATIENT
Start: 2017-09-26 | End: 2017-09-26

## 2017-09-26 RX ORDER — CLONIDINE HYDROCHLORIDE 0.1 MG/1
0.1 TABLET ORAL ONCE
Status: COMPLETED | OUTPATIENT
Start: 2017-09-26 | End: 2017-09-26

## 2017-09-26 RX ORDER — ZIPRASIDONE HYDROCHLORIDE 20 MG/1
20 CAPSULE ORAL ONCE
Status: COMPLETED | OUTPATIENT
Start: 2017-09-26 | End: 2017-09-26

## 2017-09-26 RX ORDER — CLONIDINE HYDROCHLORIDE 0.2 MG/1
0.2 TABLET ORAL EVERY 12 HOURS SCHEDULED
Status: DISCONTINUED | OUTPATIENT
Start: 2017-09-26 | End: 2017-09-26 | Stop reason: HOSPADM

## 2017-09-26 RX ORDER — CLONIDINE HYDROCHLORIDE 0.2 MG/1
0.2 TABLET ORAL ONCE
Status: DISCONTINUED | OUTPATIENT
Start: 2017-09-26 | End: 2017-09-26 | Stop reason: HOSPADM

## 2017-09-26 RX ADMIN — CLONIDINE HYDROCHLORIDE 0.2 MG: 0.2 TABLET ORAL at 16:38

## 2017-09-26 RX ADMIN — CLONIDINE HYDROCHLORIDE 0.1 MG: 0.1 TABLET ORAL at 20:33

## 2017-09-26 RX ADMIN — LABETALOL HYDROCHLORIDE 100 MG: 100 TABLET, FILM COATED ORAL at 15:03

## 2017-09-26 RX ADMIN — LORAZEPAM 1 MG: 2 INJECTION INTRAMUSCULAR; INTRAVENOUS at 21:05

## 2017-09-26 RX ADMIN — ZIPRASIDONE HYDROCHLORIDE 20 MG: 20 CAPSULE ORAL at 15:41

## 2017-09-27 VITALS
HEART RATE: 68 BPM | OXYGEN SATURATION: 99 % | TEMPERATURE: 99.3 F | BODY MASS INDEX: 36.11 KG/M2 | HEIGHT: 72 IN | SYSTOLIC BLOOD PRESSURE: 127 MMHG | WEIGHT: 266.6 LBS | DIASTOLIC BLOOD PRESSURE: 79 MMHG | RESPIRATION RATE: 17 BRPM

## 2017-09-27 PROBLEM — F29 PSYCHOSIS (HCC): Status: ACTIVE | Noted: 2017-09-27

## 2017-09-27 LAB
GLUCOSE BLDC GLUCOMTR-MCNC: 124 MG/DL (ref 70–130)
GLUCOSE BLDC GLUCOMTR-MCNC: 94 MG/DL (ref 70–130)
GLUCOSE BLDC GLUCOMTR-MCNC: 99 MG/DL (ref 70–130)

## 2017-09-27 PROCEDURE — 99232 SBSQ HOSP IP/OBS MODERATE 35: CPT | Performed by: FAMILY MEDICINE

## 2017-09-27 PROCEDURE — 82962 GLUCOSE BLOOD TEST: CPT

## 2017-09-27 PROCEDURE — 90791 PSYCH DIAGNOSTIC EVALUATION: CPT | Performed by: NURSE PRACTITIONER

## 2017-09-27 RX ORDER — HYDROXYZINE PAMOATE 50 MG/1
50 CAPSULE ORAL EVERY 6 HOURS PRN
Status: DISCONTINUED | OUTPATIENT
Start: 2017-09-26 | End: 2017-10-04 | Stop reason: HOSPADM

## 2017-09-27 RX ORDER — PANTOPRAZOLE SODIUM 40 MG/1
40 TABLET, DELAYED RELEASE ORAL EVERY MORNING
Status: DISCONTINUED | OUTPATIENT
Start: 2017-09-27 | End: 2017-10-04 | Stop reason: HOSPADM

## 2017-09-27 RX ORDER — LOPERAMIDE HYDROCHLORIDE 2 MG/1
2 CAPSULE ORAL 4 TIMES DAILY PRN
Status: DISCONTINUED | OUTPATIENT
Start: 2017-09-26 | End: 2017-10-04 | Stop reason: HOSPADM

## 2017-09-27 RX ORDER — ACETAMINOPHEN 325 MG/1
650 TABLET ORAL EVERY 4 HOURS PRN
Status: DISCONTINUED | OUTPATIENT
Start: 2017-09-26 | End: 2017-10-04 | Stop reason: HOSPADM

## 2017-09-27 RX ORDER — ATORVASTATIN CALCIUM 20 MG/1
20 TABLET, FILM COATED ORAL NIGHTLY
Status: DISCONTINUED | OUTPATIENT
Start: 2017-09-27 | End: 2017-10-04 | Stop reason: HOSPADM

## 2017-09-27 RX ORDER — DEXTROSE MONOHYDRATE 25 G/50ML
25 INJECTION, SOLUTION INTRAVENOUS
Status: DISCONTINUED | OUTPATIENT
Start: 2017-09-27 | End: 2017-10-04 | Stop reason: HOSPADM

## 2017-09-27 RX ORDER — ENALAPRIL MALEATE 10 MG/1
10 TABLET ORAL DAILY
Status: DISCONTINUED | OUTPATIENT
Start: 2017-09-27 | End: 2017-10-04 | Stop reason: HOSPADM

## 2017-09-27 RX ORDER — HYDROCHLOROTHIAZIDE 25 MG/1
25 TABLET ORAL DAILY
Status: DISCONTINUED | OUTPATIENT
Start: 2017-09-27 | End: 2017-10-04 | Stop reason: HOSPADM

## 2017-09-27 RX ORDER — ALUMINA, MAGNESIA, AND SIMETHICONE 2400; 2400; 240 MG/30ML; MG/30ML; MG/30ML
15 SUSPENSION ORAL EVERY 6 HOURS PRN
Status: DISCONTINUED | OUTPATIENT
Start: 2017-09-26 | End: 2017-10-04 | Stop reason: HOSPADM

## 2017-09-27 RX ORDER — TRAZODONE HYDROCHLORIDE 50 MG/1
50 TABLET ORAL NIGHTLY PRN
Status: DISCONTINUED | OUTPATIENT
Start: 2017-09-26 | End: 2017-10-04 | Stop reason: HOSPADM

## 2017-09-27 RX ORDER — NICOTINE POLACRILEX 4 MG
15 LOZENGE BUCCAL
Status: DISCONTINUED | OUTPATIENT
Start: 2017-09-27 | End: 2017-10-04 | Stop reason: HOSPADM

## 2017-09-27 RX ORDER — CLONAZEPAM 0.5 MG/1
0.5 TABLET ORAL 2 TIMES DAILY
Status: DISCONTINUED | OUTPATIENT
Start: 2017-09-27 | End: 2017-09-27

## 2017-09-27 RX ORDER — LEVOTHYROXINE SODIUM 0.05 MG/1
50 TABLET ORAL
Status: DISCONTINUED | OUTPATIENT
Start: 2017-09-27 | End: 2017-10-04 | Stop reason: HOSPADM

## 2017-09-27 RX ORDER — MELATONIN
5000 DAILY
Status: DISCONTINUED | OUTPATIENT
Start: 2017-09-27 | End: 2017-10-04 | Stop reason: HOSPADM

## 2017-09-27 RX ORDER — CLONIDINE HYDROCHLORIDE 0.1 MG/1
0.1 TABLET ORAL EVERY 4 HOURS PRN
Status: DISCONTINUED | OUTPATIENT
Start: 2017-09-26 | End: 2017-10-04 | Stop reason: HOSPADM

## 2017-09-27 RX ADMIN — METOPROLOL TARTRATE 25 MG: 25 TABLET ORAL at 10:54

## 2017-09-27 RX ADMIN — OLANZAPINE 7.5 MG: 2.5 TABLET, FILM COATED ORAL at 20:42

## 2017-09-27 RX ADMIN — VITAMIN D, TAB 1000IU (100/BT) 5000 UNITS: 25 TAB at 10:52

## 2017-09-27 RX ADMIN — SERTRALINE HYDROCHLORIDE 50 MG: 50 TABLET ORAL at 10:54

## 2017-09-27 RX ADMIN — HYDROXYZINE PAMOATE 50 MG: 50 CAPSULE ORAL at 20:53

## 2017-09-27 RX ADMIN — METOPROLOL TARTRATE 25 MG: 25 TABLET ORAL at 17:25

## 2017-09-27 RX ADMIN — ATORVASTATIN CALCIUM 20 MG: 20 TABLET, FILM COATED ORAL at 20:42

## 2017-09-27 RX ADMIN — LEVOTHYROXINE SODIUM 50 MCG: 50 TABLET ORAL at 10:53

## 2017-09-27 RX ADMIN — PANTOPRAZOLE SODIUM 40 MG: 40 TABLET, DELAYED RELEASE ORAL at 10:53

## 2017-09-27 RX ADMIN — HYDROCHLOROTHIAZIDE 25 MG: 25 TABLET ORAL at 10:53

## 2017-09-27 RX ADMIN — ENALAPRIL MALEATE 10 MG: 10 TABLET ORAL at 10:53

## 2017-09-27 RX ADMIN — OLANZAPINE 7.5 MG: 2.5 TABLET, FILM COATED ORAL at 13:47

## 2017-09-27 RX ADMIN — CLONAZEPAM 0.5 MG: 0.5 TABLET ORAL at 10:53

## 2017-09-28 PROBLEM — K21.9 GASTROESOPHAGEAL REFLUX DISEASE: Status: RESOLVED | Noted: 2017-06-22 | Resolved: 2017-09-28

## 2017-09-28 LAB
GLUCOSE BLDC GLUCOMTR-MCNC: 101 MG/DL (ref 70–130)
GLUCOSE BLDC GLUCOMTR-MCNC: 106 MG/DL (ref 70–130)
GLUCOSE BLDC GLUCOMTR-MCNC: 107 MG/DL (ref 70–130)
GLUCOSE BLDC GLUCOMTR-MCNC: 92 MG/DL (ref 70–130)

## 2017-09-28 PROCEDURE — 82962 GLUCOSE BLOOD TEST: CPT

## 2017-09-28 PROCEDURE — 99232 SBSQ HOSP IP/OBS MODERATE 35: CPT | Performed by: PSYCHIATRY & NEUROLOGY

## 2017-09-28 RX ADMIN — PANTOPRAZOLE SODIUM 40 MG: 40 TABLET, DELAYED RELEASE ORAL at 06:00

## 2017-09-28 RX ADMIN — METOPROLOL TARTRATE 25 MG: 25 TABLET ORAL at 08:28

## 2017-09-28 RX ADMIN — OLANZAPINE 7.5 MG: 2.5 TABLET, FILM COATED ORAL at 20:04

## 2017-09-28 RX ADMIN — OLANZAPINE 7.5 MG: 2.5 TABLET, FILM COATED ORAL at 08:31

## 2017-09-28 RX ADMIN — ATORVASTATIN CALCIUM 20 MG: 20 TABLET, FILM COATED ORAL at 20:04

## 2017-09-28 RX ADMIN — SERTRALINE HYDROCHLORIDE 50 MG: 50 TABLET ORAL at 08:27

## 2017-09-28 RX ADMIN — VITAMIN D, TAB 1000IU (100/BT) 5000 UNITS: 25 TAB at 08:27

## 2017-09-28 RX ADMIN — ENALAPRIL MALEATE 10 MG: 10 TABLET ORAL at 08:28

## 2017-09-28 RX ADMIN — LEVOTHYROXINE SODIUM 50 MCG: 50 TABLET ORAL at 06:00

## 2017-09-28 RX ADMIN — HYDROXYZINE PAMOATE 50 MG: 50 CAPSULE ORAL at 06:38

## 2017-09-28 RX ADMIN — HYDROCHLOROTHIAZIDE 25 MG: 25 TABLET ORAL at 08:28

## 2017-09-29 PROBLEM — F31.2 BIPOLAR I DISORDER, CURRENT OR MOST RECENT EPISODE MANIC, WITH PSYCHOTIC FEATURES (HCC): Status: ACTIVE | Noted: 2017-09-27

## 2017-09-29 LAB
GLUCOSE BLDC GLUCOMTR-MCNC: 134 MG/DL (ref 70–130)
GLUCOSE BLDC GLUCOMTR-MCNC: 139 MG/DL (ref 70–130)
GLUCOSE BLDC GLUCOMTR-MCNC: 140 MG/DL (ref 70–130)
GLUCOSE BLDC GLUCOMTR-MCNC: 82 MG/DL (ref 70–130)

## 2017-09-29 PROCEDURE — 82962 GLUCOSE BLOOD TEST: CPT

## 2017-09-29 PROCEDURE — 99232 SBSQ HOSP IP/OBS MODERATE 35: CPT | Performed by: PSYCHIATRY & NEUROLOGY

## 2017-09-29 RX ADMIN — METOPROLOL TARTRATE 25 MG: 25 TABLET ORAL at 08:24

## 2017-09-29 RX ADMIN — SERTRALINE HYDROCHLORIDE 50 MG: 50 TABLET ORAL at 08:23

## 2017-09-29 RX ADMIN — ATORVASTATIN CALCIUM 20 MG: 20 TABLET, FILM COATED ORAL at 20:26

## 2017-09-29 RX ADMIN — VITAMIN D, TAB 1000IU (100/BT) 5000 UNITS: 25 TAB at 08:23

## 2017-09-29 RX ADMIN — ENALAPRIL MALEATE 10 MG: 10 TABLET ORAL at 08:23

## 2017-09-29 RX ADMIN — HYDROCHLOROTHIAZIDE 25 MG: 25 TABLET ORAL at 08:23

## 2017-09-29 RX ADMIN — OLANZAPINE 7.5 MG: 2.5 TABLET, FILM COATED ORAL at 08:22

## 2017-09-29 RX ADMIN — OLANZAPINE 7.5 MG: 2.5 TABLET, FILM COATED ORAL at 20:26

## 2017-09-29 RX ADMIN — LEVOTHYROXINE SODIUM 50 MCG: 50 TABLET ORAL at 06:10

## 2017-09-29 RX ADMIN — METOPROLOL TARTRATE 25 MG: 25 TABLET ORAL at 17:07

## 2017-09-29 RX ADMIN — PANTOPRAZOLE SODIUM 40 MG: 40 TABLET, DELAYED RELEASE ORAL at 06:10

## 2017-09-30 LAB
GLUCOSE BLDC GLUCOMTR-MCNC: 132 MG/DL (ref 70–130)
GLUCOSE BLDC GLUCOMTR-MCNC: 66 MG/DL (ref 70–130)
GLUCOSE BLDC GLUCOMTR-MCNC: 88 MG/DL (ref 70–130)
GLUCOSE BLDC GLUCOMTR-MCNC: 99 MG/DL (ref 70–130)

## 2017-09-30 PROCEDURE — 82962 GLUCOSE BLOOD TEST: CPT

## 2017-09-30 PROCEDURE — 99232 SBSQ HOSP IP/OBS MODERATE 35: CPT | Performed by: NURSE PRACTITIONER

## 2017-09-30 RX ORDER — OLANZAPINE 5 MG/1
5 TABLET ORAL 2 TIMES DAILY
Status: DISCONTINUED | OUTPATIENT
Start: 2017-09-30 | End: 2017-10-02

## 2017-09-30 RX ADMIN — METOPROLOL TARTRATE 25 MG: 25 TABLET ORAL at 08:08

## 2017-09-30 RX ADMIN — LEVOTHYROXINE SODIUM 50 MCG: 50 TABLET ORAL at 05:59

## 2017-09-30 RX ADMIN — METOPROLOL TARTRATE 25 MG: 25 TABLET ORAL at 18:31

## 2017-09-30 RX ADMIN — HYDROXYZINE PAMOATE 50 MG: 50 CAPSULE ORAL at 10:52

## 2017-09-30 RX ADMIN — OLANZAPINE 5 MG: 5 TABLET, FILM COATED ORAL at 20:32

## 2017-09-30 RX ADMIN — VITAMIN D, TAB 1000IU (100/BT) 5000 UNITS: 25 TAB at 08:08

## 2017-09-30 RX ADMIN — OLANZAPINE 7.5 MG: 2.5 TABLET, FILM COATED ORAL at 08:07

## 2017-09-30 RX ADMIN — HYDROXYZINE PAMOATE 50 MG: 50 CAPSULE ORAL at 18:31

## 2017-09-30 RX ADMIN — PANTOPRAZOLE SODIUM 40 MG: 40 TABLET, DELAYED RELEASE ORAL at 07:41

## 2017-09-30 RX ADMIN — ENALAPRIL MALEATE 10 MG: 10 TABLET ORAL at 08:07

## 2017-09-30 RX ADMIN — SERTRALINE HYDROCHLORIDE 50 MG: 50 TABLET ORAL at 08:08

## 2017-09-30 RX ADMIN — HYDROCHLOROTHIAZIDE 25 MG: 25 TABLET ORAL at 08:07

## 2017-09-30 RX ADMIN — ATORVASTATIN CALCIUM 20 MG: 20 TABLET, FILM COATED ORAL at 20:33

## 2017-10-01 LAB
GLUCOSE BLDC GLUCOMTR-MCNC: 117 MG/DL (ref 70–130)
GLUCOSE BLDC GLUCOMTR-MCNC: 80 MG/DL (ref 70–130)
GLUCOSE BLDC GLUCOMTR-MCNC: 85 MG/DL (ref 70–130)
GLUCOSE BLDC GLUCOMTR-MCNC: 95 MG/DL (ref 70–130)

## 2017-10-01 PROCEDURE — 99232 SBSQ HOSP IP/OBS MODERATE 35: CPT | Performed by: NURSE PRACTITIONER

## 2017-10-01 PROCEDURE — 82962 GLUCOSE BLOOD TEST: CPT

## 2017-10-01 RX ADMIN — OLANZAPINE 5 MG: 5 TABLET, FILM COATED ORAL at 08:13

## 2017-10-01 RX ADMIN — METOPROLOL TARTRATE 25 MG: 25 TABLET ORAL at 08:07

## 2017-10-01 RX ADMIN — HYDROXYZINE PAMOATE 50 MG: 50 CAPSULE ORAL at 08:14

## 2017-10-01 RX ADMIN — ATORVASTATIN CALCIUM 20 MG: 20 TABLET, FILM COATED ORAL at 21:47

## 2017-10-01 RX ADMIN — VITAMIN D, TAB 1000IU (100/BT) 5000 UNITS: 25 TAB at 08:07

## 2017-10-01 RX ADMIN — SERTRALINE HYDROCHLORIDE 50 MG: 50 TABLET ORAL at 08:08

## 2017-10-01 RX ADMIN — HYDROCHLOROTHIAZIDE 25 MG: 25 TABLET ORAL at 08:07

## 2017-10-01 RX ADMIN — HYDROXYZINE PAMOATE 50 MG: 50 CAPSULE ORAL at 15:29

## 2017-10-01 RX ADMIN — LEVOTHYROXINE SODIUM 50 MCG: 50 TABLET ORAL at 06:01

## 2017-10-01 RX ADMIN — ENALAPRIL MALEATE 10 MG: 10 TABLET ORAL at 08:07

## 2017-10-01 RX ADMIN — PANTOPRAZOLE SODIUM 40 MG: 40 TABLET, DELAYED RELEASE ORAL at 08:07

## 2017-10-01 RX ADMIN — METOPROLOL TARTRATE 25 MG: 25 TABLET ORAL at 17:29

## 2017-10-01 RX ADMIN — OLANZAPINE 5 MG: 5 TABLET, FILM COATED ORAL at 21:46

## 2017-10-01 NOTE — NURSING NOTE
"Behavior   Anxiety 8  Depression 8  Pain 0  AVH no  S/I no  H/I no  Up for breakfast. Guarded. Quiet. Good eye contact with staff. Interacting with staff with prompting. \"I'm worried about my family. I havent talked to my wife. I dont know what happened. I have a 16 year old girl in [place]. Im not close to her but i'm working on it.\" denies cobra in stomach.          Intervention  Instructed in med usage and effects, encouraged to verbalize needs. Meds administered as ordered.encoruaged pt to notify staff of hallucinations.. Notify staff of feelings of self harm          Response  Verbalized understanding           Plan  Continue to monitor for safety/  every 15 minute monitoring checks.    "

## 2017-10-01 NOTE — PROGRESS NOTES
"    10/1/2017    Chief Complaint: less sedated after decrease in Zyprex dose, but states he still feels numb    Subjective:  Patient is a 42 y.o. male who was hospitalized for psychosis, hallucinations, delusions and anxiety.   Since yesterday the patient has been continuing to be somewhat improved.  Has been having symptoms including delusional thinking, but without behavioral disturbance and/or aggression, self injury, tantrums, explosive outbursts and elopement. Patient reports that level of hopefulness is 10/10.  Medications are effective.  Further history reported: he states he is ready for discharge but continues to lack insight into the reason why he was admitted. Still believes he had cobras in his stomach and does not identify, even today, that cutting them out would have been dangerous for him. He response with a smile.    Objective     Vital Signs    /69 (BP Location: Right arm, Patient Position: Standing)  Pulse 98  Temp 96 °F (35.6 °C) (Tympanic)   Resp 20  Ht 71\" (180.3 cm)  Wt 259 lb 8 oz (118 kg)  SpO2 98%  BMI 36.19 kg/m2    Physical Exam:   General Appearance: alert, appears stated age and cooperative,  Hygiene:   fair  Gait & Station: Normal  Musculoskeletal: No tremors or abnormal involuntary movements    Mental Status Exam:   Cooperation:  Evasive  Eye Contact:  Fair  Psychomotor Behavior:  Slow  Mood: neutral   Affect:  blunted and with occasional smile  Speech:  Monotone  Thought Process:  continues to support the delusion that he had snakes in his stomach  Associations: Disorganized  Thought Content:  delusions   Suicidal Ideation:  None   Homicidal Ideation:  None   Hallucinations:  states the snakes are gone   Delusion:  None  Cognitive Functioning:   Consciousness: awake, alert and oriented  Reliability:  poor  Insight:  limited  Judgement:  Impaired  Impulse Control:  Fair    Lab Results (last 24 hours)     Procedure Component Value Units Date/Time    POC Glucose Fingerstick " [[id number]]  (Abnormal) Collected:  09/30/17 1210    Specimen:  Blood Updated:  09/30/17 1222     Glucose 66 (L) mg/dL       Meter: [id number]: [id number] [employer]       POC Glucose Fingerstick [[id number]]  (Abnormal) Collected:  09/30/17 1646    Specimen:  Blood Updated:  09/30/17 1708     Glucose 132 (H) mg/dL       RN NotifiedMeter: [id number]: [id number] [employer]       POC Glucose Fingerstick [[id number]]  (Normal) Collected:  09/30/17 1957    Specimen:  Blood Updated:  09/30/17 2023     Glucose 99 mg/dL       RN NotifiedMeter: [id number]: [id number] [facility]       POC Glucose Fingerstick [[id number]]  (Normal) Collected:  10/01/17 0550    Specimen:  Blood Updated:  10/01/17 0603     Glucose 95 mg/dL       RN NotifiedMeter: [id number]: [id number] [facility]           Imaging Results (last 24 hours)     ** No results found for the last 24 hours. **          Medicine:   Current Facility-Administered Medications:   •  acetaminophen (TYLENOL) tablet 650 mg, 650 mg, Oral, Q4H PRN, Amaury Hedrick MD  •  aluminum-magnesium hydroxide-simethicone (MAALOX MAX) 400-400-40 MG/5ML suspension 15 mL, 15 mL, Oral, Q6H PRN, Amaury Hedrick MD  •  atorvastatin (LIPITOR) tablet 20 mg, 20 mg, Oral, Nightly, SARIKA Hines, 20 mg at 09/30/17 2033  •  cholecalciferol (VITAMIN D3) tablet 5,000 Units, 5,000 Units, Oral, Daily, SARIKA Hines, 5,000 Units at 10/01/17 0807  •  CloNIDine (CATAPRES) tablet 0.1 mg, 0.1 mg, Oral, Q4H PRN, Amaury Hedrick MD  •  dextrose (D50W) solution 25 g, 25 g, Intravenous, Q15 Min PRN, Noble Conteh MD  •  dextrose (GLUTOSE) oral gel 15 g, 15 g, Oral, Q15 Min PRN, Noble Conteh MD  •  enalapril (VASOTEC) tablet 10 mg, 10 mg, Oral, Daily, Casi Leblanc, APRN, 10 mg at 10/01/17 0807  •  glucagon (human recombinant) (GLUCAGEN DIAGNOSTIC) injection 1 mg, 1 mg, Subcutaneous, Q15 Min PRN, Noble Conteh,  MD  •  hydrochlorothiazide (HYDRODIURIL) tablet 25 mg, 25 mg, Oral, Daily, Casi Leblanc APRN, 25 mg at 10/01/17 0807  •  hydrOXYzine (VISTARIL) capsule 50 mg, 50 mg, Oral, Q6H PRN, Amaury Hedrick MD, 50 mg at 10/01/17 0814  •  insulin aspart (novoLOG) injection 0-9 Units, 0-9 Units, Subcutaneous, 4x Daily AC & at Bedtime, Noble Conteh MD  •  levothyroxine (SYNTHROID, LEVOTHROID) tablet 50 mcg, 50 mcg, Oral, Q AM, SARIKA Hines, 50 mcg at 10/01/17 0601  •  loperamide (IMODIUM) capsule 2 mg, 2 mg, Oral, 4x Daily PRN, Amaury Hedrick MD  •  magnesium hydroxide (MILK OF MAGNESIA) suspension 2400 mg/10mL 10 mL, 10 mL, Oral, Daily PRN, Amaury Hedrick MD  •  metoprolol tartrate (LOPRESSOR) tablet 25 mg, 25 mg, Oral, BID, Casi Leblanc APRN, 25 mg at 10/01/17 0807  •  OLANZapine (zyPREXA) tablet 5 mg, 5 mg, Oral, BID, Casi Leblanc APRN, 5 mg at 10/01/17 0813  •  pantoprazole (PROTONIX) EC tablet 40 mg, 40 mg, Oral, QAM, Casi Leblanc APRN, 40 mg at 10/01/17 0807  •  sertraline (ZOLOFT) tablet 50 mg, 50 mg, Oral, Daily, Casi Leblanc APRN, 50 mg at 10/01/17 0808  •  traZODone (DESYREL) tablet 50 mg, 50 mg, Oral, Nightly PRN, Amaury Hedrick MD    Diagnoses/Assessment:   Principal Problem:    Bipolar I disorder, current or most recent episode manic, with psychotic features  Active Problems:    Gastro-esophageal reflux disease with esophagitis      Treatment Plan:    1) Will continue care for the patient on the behavioral health unit at [facility] to ensure patient safety.  2) Will continue to provide treatment with the unit milieu, activities, therapies and psychopharmacological management.  3) Patient to be placed on or continued on every 15 minute safety checks, suicide, & Suicide precautions.  4) Will continue medical management by Dr. Conteh  5) Will order following labs: no new labs  6) Will make the following medication changes:  will continue Zyprexa at current dose  7) Will continue discharge planning as appropriate for patient.  8) Psychotherapy provided: none    Treatment plan and medication risks and benefits discussed with: Patient    SARIKA Hines  10/01/17  9:10 AM

## 2017-10-01 NOTE — PLAN OF CARE
Problem: BH Patient Care Overview (Adult)  Goal: Individualization and Mutuality  Outcome: Ongoing (interventions implemented as appropriate)  Goal: Discharge Needs Assessment  Outcome: Ongoing (interventions implemented as appropriate)    Problem:  Overarching Goals  Goal: Adheres to Safety Considerations for Self and Others  Outcome: Ongoing (interventions implemented as appropriate)  Goal: Optimized Coping Skills in Response to Life Stressors  Outcome: Ongoing (interventions implemented as appropriate)  Goal: Develops/Participates in Therapeutic [place] to Support Successful Transition  Outcome: Ongoing (interventions implemented as appropriate)

## 2017-10-01 NOTE — NURSING NOTE
"Behavior   Anxiety 4  Depression 0  Pain 0  AVH no  S/I no  H/I no     Patient interviewed in common area. Patient is eating snack and interacting with other patients; seems to be integrating a bit more.  Patient reports having a good day with improved levels of anxiety.  He states he is unable to assign a trigger or prompt for the anxiety; it is \"just there.\"  Patient reflects on visit with brother and friend earlier today and his feeling rested.  He continues to deny any hallucinations and almost laughs the idea away.  He produces this expression as if to say:  What are you thinking asking me these questions.  He dismisses them entirely.  He is pleasant enough with the interview but, overall, is quite guarded and perhaps a bit suspicious.  It appears that he is not being entirely honest with his answers and interaction with assessment.  Will continue to monitor, intervene, and offer staff support / availability.      Intervention  Medications administered and assessment complete    Response  Verbalized understanding     Plan  Will promote and reinforce current treatment plan and encourage involvement in care plan goals. Will provide for safe, calm, quiet environment. Will promote open communication with staff and foster a trusting/working relationship with patient. Will promote participation in groups and therapies and independent reflection.        "

## 2017-10-01 NOTE — PLAN OF CARE
Problem: BH Overarching Goals  Goal: Adheres to Safety Considerations for Self and Others  Outcome: Ongoing (interventions implemented as appropriate)  Goal: Optimized Coping Skills in Response to Life Stressors  Outcome: Ongoing (interventions implemented as appropriate)  Goal: Develops/Participates in Therapeutic [place] to Support Successful Transition  Outcome: Ongoing (interventions implemented as appropriate)    Problem: Alteration in Thoughts and Perception  Goal: Treatment Goal: Gain control of psychotic behaviors/thinking, reduce/eliminate presenting symptoms and demonstrate improved reality functioning upon discharge  Outcome: Ongoing (interventions implemented as appropriate)  Goal: Refrain from acting on delusional thinking/internal stimuli  Outcome: Ongoing (interventions implemented as appropriate)  Goal: Agree to be compliant with medication regime, as prescribed and report medication side effects  Outcome: Ongoing (interventions implemented as appropriate)  Goal: Attend and participate in unit activities, including therapeutic, recreational, and educational groups  Outcome: Ongoing (interventions implemented as appropriate)

## 2017-10-02 LAB
GLUCOSE BLDC GLUCOMTR-MCNC: 111 MG/DL (ref 70–130)
GLUCOSE BLDC GLUCOMTR-MCNC: 119 MG/DL (ref 70–130)
GLUCOSE BLDC GLUCOMTR-MCNC: 95 MG/DL (ref 70–130)
GLUCOSE BLDC GLUCOMTR-MCNC: 98 MG/DL (ref 70–130)

## 2017-10-02 PROCEDURE — 99232 SBSQ HOSP IP/OBS MODERATE 35: CPT | Performed by: PSYCHIATRY & NEUROLOGY

## 2017-10-02 PROCEDURE — 82962 GLUCOSE BLOOD TEST: CPT

## 2017-10-02 RX ADMIN — METOPROLOL TARTRATE 25 MG: 25 TABLET ORAL at 18:08

## 2017-10-02 RX ADMIN — OLANZAPINE 5 MG: 5 TABLET, FILM COATED ORAL at 08:22

## 2017-10-02 RX ADMIN — ENALAPRIL MALEATE 10 MG: 10 TABLET ORAL at 08:22

## 2017-10-02 RX ADMIN — METOPROLOL TARTRATE 25 MG: 25 TABLET ORAL at 08:22

## 2017-10-02 RX ADMIN — VITAMIN D, TAB 1000IU (100/BT) 5000 UNITS: 25 TAB at 08:22

## 2017-10-02 RX ADMIN — SERTRALINE HYDROCHLORIDE 50 MG: 50 TABLET ORAL at 09:00

## 2017-10-02 RX ADMIN — HYDROCHLOROTHIAZIDE 25 MG: 25 TABLET ORAL at 08:22

## 2017-10-02 RX ADMIN — LEVOTHYROXINE SODIUM 50 MCG: 50 TABLET ORAL at 06:29

## 2017-10-02 RX ADMIN — ATORVASTATIN CALCIUM 20 MG: 20 TABLET, FILM COATED ORAL at 21:40

## 2017-10-02 RX ADMIN — PANTOPRAZOLE SODIUM 40 MG: 40 TABLET, DELAYED RELEASE ORAL at 07:59

## 2017-10-02 RX ADMIN — OLANZAPINE 7.5 MG: 2.5 TABLET, FILM COATED ORAL at 21:41

## 2017-10-02 NOTE — NURSING NOTE
Kuldip has been asking about going home today.  He has had some medication adjustment.  He does not speak of anything in  His stomach and even acts surprised when asked about it.    His sister called and stated that there is some conflict tomorrow with the family meeting.  Qasim is aware. Kuldip has remained somewhat secluded today but will attend groups.

## 2017-10-02 NOTE — PLAN OF CARE
Problem: BH Patient Care Overview (Adult)  Goal: Individualization and Mutuality  Outcome: Ongoing (interventions implemented as appropriate)  Goal: Discharge Needs Assessment  Outcome: Ongoing (interventions implemented as appropriate)    Problem:  Overarching Goals  Goal: Adheres to Safety Considerations for Self and Others  Outcome: Ongoing (interventions implemented as appropriate)  Goal: Optimized Coping Skills in Response to Life Stressors  Outcome: Ongoing (interventions implemented as appropriate)  Goal: Develops/Participates in Therapeutic [place] to Support Successful Transition  Outcome: Ongoing (interventions implemented as appropriate)

## 2017-10-02 NOTE — PLAN OF CARE
Problem: Alteration in Thoughts and Perception  Goal: Treatment Goal: Gain control of psychotic behaviors/thinking, reduce/eliminate presenting symptoms and demonstrate improved reality functioning upon discharge  Outcome: Ongoing (interventions implemented as appropriate)  Goal: Refrain from acting on delusional thinking/internal stimuli  Outcome: Ongoing (interventions implemented as appropriate)  Goal: Agree to be compliant with medication regime, as prescribed and report medication side effects  Outcome: Ongoing (interventions implemented as appropriate)  Goal: Attend and participate in unit activities, including therapeutic, recreational, and educational groups  Outcome: Ongoing (interventions implemented as appropriate)

## 2017-10-02 NOTE — NURSING NOTE
"Behavior   Anxiety 8  Depression 8  Pain 0  AVH no  S/I no  H/I no     Patient assessed in room.  Overall-patient seems unchanged as compared to last assessment, with the exception of anxiety and depression being rated at 8/10.  He continues with the notion that it's humorous or unnecessary to be ask questions/posed prompts that come up during assessment.  He is polite and cooperative, but he consistent in his denial (on this shift) of hallucinations and ideas that aren't consistent with reality.  He comments, \"I'm frustrated-I'm ready to go home.\"  Patient is very focused on discharge.  He states he will be returning home to his mothers' home.  He states his anxiety and depression have improved this evening, although he rates them at 8/10.  When he is ask to expound upon this he states, \"I don't know why-that's just what they are.\"      Intervention  Medications administered and assessment complete    Response  Verbalized understanding     Plan  Will promote and reinforce current treatment plan and encourage involvement in care plan goals. Will provide for safe, calm, quiet environment. Will promote open communication with staff and foster a trusting/working relationship with patient. Will promote participation in groups and therapies and independent reflection.        "

## 2017-10-02 NOTE — NURSING NOTE
Behavior  Anxiety:  3/10  Depression: 3/10  AVH: Doesn't report any at this time (he does become quiet when asked about snakes in stomach)  SI: None  HI: None    Intervention: Offered medications and educated on medications.  Spoke of treatment goals and discharge plan.  Encouraged to talk to staff about feelings of doubt, paranoia, and questions about treatment goals.      Response: Medication compliant, states he wants to go home and is worried about when he goes home with his mom, but that is the only place he can go at this time.  He appears paranoid about medication as he watches and questions about medications.  He does attend groups today He is very watchful and quiet with peers.    Plan: Continue to monitor and provide for needs and safety.  Continue to work towards treatment goals and discharge planning.

## 2017-10-02 NOTE — PROGRESS NOTES
"10/2/2017    Chief Complaint: psychosis    Subjective:  Patient is a 42 y.o. male who was hospitalized for psychosis.  He continues to have no insight.  He does not understand why he is here beyond the fact that his family was concerned.  He does not seem to see the seriousness of wanting to cut his stomach to get the snakes out that he thought was in there.  He is not able to answer questions about his non-compliance to his medications in the past.  He was noncompliant with his non-psych meds as well.    Objective     Vital Signs    Temp:  [96 °F (35.6 °C)-98.5 °F (36.9 °C)] 96 °F (35.6 °C)  Heart Rate:  [57-60] 60  Resp:  [18] 18  BP: (131-136)/(76-87) 136/87    Physical Exam:   General Appearance: alert, appears stated age and cooperative,  Hygiene:   good  Gait & Station: Normal  Musculoskeletal: No tremors or abnormal involuntary movements    Mental Status Exam:   Cooperation:  Cooperative  Eye Contact:  Good  Psychomotor Behavior:  Appropriate  Mood: \"Fine\"  Affect:  bright and inappropriate  Speech:  Normal  Thought Process:  Coherent  Associations: Goal Directed  Thought Content:     Bizarre   Suicidal:  None   Homicidal:  None   Hallucinations:  Not demonstrated today   Delusion:  Bizarre somatic delusion  Cognitive Functioning:   Consciousness: awake and alert  Reliability:  fair  Insight:  Poor  Judgement:  Poor  Impulse Control:  Fair    Lab Results (last 24 hours)     Procedure Component Value Units Date/Time    POC Glucose Fingerstick [[id number]]  (Normal) Collected:  10/01/17 1532    Specimen:  Blood Updated:  10/01/17 1544     Glucose 80 mg/dL       RN NotifiedMeter: [id number]: [id number] MAICOL HOUSE       POC Glucose Fingerstick [[id number]]  (Normal) Collected:  10/01/17 2025    Specimen:  Blood Updated:  10/01/17 2042     Glucose 117 mg/dL       RN NotifiedADARADHA RNMeter: [id number]: [id number] ISHMAEL ANAHI       POC Glucose Fingerstick [[id number]]  (Normal) Collected:  " 10/02/17 0630    Specimen:  Blood Updated:  10/02/17 0642     Glucose 98 mg/dL       ALBERT Francisco RNMeter: [id number]: [id number] ISHMAEL CHRISTIAN       POC Glucose Fingerstick [[id number]]  (Normal) Collected:  10/02/17 1105    Specimen:  Blood Updated:  10/02/17 1117     Glucose 95 mg/dL       Meter: [id number]: [id number] MERISSA RITA           Imaging Results (last 24 hours)     ** No results found for the last 24 hours. **          Medicine:   Current Facility-Administered Medications:   •  acetaminophen (TYLENOL) tablet 650 mg, 650 mg, Oral, Q4H PRN, Amaury Hedrick MD  •  aluminum-magnesium hydroxide-simethicone (MAALOX MAX) 400-400-40 MG/5ML suspension 15 mL, 15 mL, Oral, Q6H PRN, Amaury Hedrick MD  •  atorvastatin (LIPITOR) tablet 20 mg, 20 mg, Oral, Nightly, SARIKA Hines, 20 mg at 10/01/17 2147  •  cholecalciferol (VITAMIN D3) tablet 5,000 Units, 5,000 Units, Oral, Daily, SARIKA Hines, 5,000 Units at 10/02/17 0822  •  CloNIDine (CATAPRES) tablet 0.1 mg, 0.1 mg, Oral, Q4H PRN, Amaury Hedrick MD  •  dextrose (D50W) solution 25 g, 25 g, Intravenous, Q15 Min PRN, Noble Conteh MD  •  dextrose (GLUTOSE) oral gel 15 g, 15 g, Oral, Q15 Min PRN, Noble Conteh MD  •  enalapril (VASOTEC) tablet 10 mg, 10 mg, Oral, Daily, SARIKA Hines, 10 mg at 10/02/17 0822  •  glucagon (human recombinant) (GLUCAGEN DIAGNOSTIC) injection 1 mg, 1 mg, Subcutaneous, Q15 Min PRN, Noble Conteh MD  •  hydrochlorothiazide (HYDRODIURIL) tablet 25 mg, 25 mg, Oral, Daily, SARIKA Hines, 25 mg at 10/02/17 0822  •  hydrOXYzine (VISTARIL) capsule 50 mg, 50 mg, Oral, Q6H PRN, Amaury Hedrick MD, 50 mg at 10/01/17 1529  •  insulin aspart (novoLOG) injection 0-9 Units, 0-9 Units, Subcutaneous, 4x Daily AC & at Bedtime, Noble Conteh MD  •  levothyroxine (SYNTHROID, LEVOTHROID) tablet 50 mcg, 50 mcg, Oral, Q AM, Casi Leblanc, APRN, 50  mcg at 10/02/17 0629  •  loperamide (IMODIUM) capsule 2 mg, 2 mg, Oral, 4x Daily PRN, Amaury Hedrick MD  •  magnesium hydroxide (MILK OF MAGNESIA) suspension 2400 mg/10mL 10 mL, 10 mL, Oral, Daily PRN, Amaury Hedrick MD  •  metoprolol tartrate (LOPRESSOR) tablet 25 mg, 25 mg, Oral, BID, Casi Leblanc APRN, 25 mg at 10/02/17 0822  •  OLANZapine (zyPREXA) tablet 5 mg, 5 mg, Oral, BID, Casi Leblanc APRN, 5 mg at 10/02/17 0822  •  pantoprazole (PROTONIX) EC tablet 40 mg, 40 mg, Oral, QAM, Casi Leblanc APRN, 40 mg at 10/02/17 0759  •  sertraline (ZOLOFT) tablet 50 mg, 50 mg, Oral, Daily, Casi Leblanc APRN, 50 mg at 10/02/17 0900  •  traZODone (DESYREL) tablet 50 mg, 50 mg, Oral, Nightly PRN, Amaury Hedrick MD    Diagnoses/Assessment:   Principal Problem:    Bipolar I disorder, current or most recent episode manic, with psychotic features  Active Problems:    Gastro-esophageal reflux disease with esophagitis      Treatment Plan:    1) Will continue care for the patient on the behavioral health unit at [facility] to ensure patient safety.  2) Will continue to provide treatment with the unit milieu, activities, therapies and psychopharmacological management.  3) Patient to be placed on or continued on  Q15 minute checks  and Suicide precautions.  4) Will continue medical management by Dr. Conteh.  5) Will order following labs: none  6) Will make the following medication changes: Will increase the zyprexa to 7.5mg bid.  7) Will continue discharge planning as appropriate for patient.  8) Psychotherapy provided: none    Treatment plan and medication risks and benefits discussed with: Patient    Amaury Hedrick MD  10/02/17  2:00 PM

## 2017-10-03 LAB
GLUCOSE BLDC GLUCOMTR-MCNC: 102 MG/DL (ref 70–130)
GLUCOSE BLDC GLUCOMTR-MCNC: 113 MG/DL (ref 70–130)
GLUCOSE BLDC GLUCOMTR-MCNC: 120 MG/DL (ref 70–130)
GLUCOSE BLDC GLUCOMTR-MCNC: 136 MG/DL (ref 70–130)
GLUCOSE BLDC GLUCOMTR-MCNC: 89 MG/DL (ref 70–130)

## 2017-10-03 PROCEDURE — 99232 SBSQ HOSP IP/OBS MODERATE 35: CPT | Performed by: PSYCHIATRY & NEUROLOGY

## 2017-10-03 PROCEDURE — 82962 GLUCOSE BLOOD TEST: CPT

## 2017-10-03 RX ORDER — ARIPIPRAZOLE 20 MG/1
20 TABLET ORAL DAILY
Status: DISCONTINUED | OUTPATIENT
Start: 2017-10-04 | End: 2017-10-03

## 2017-10-03 RX ORDER — ARIPIPRAZOLE 10 MG/1
10 TABLET ORAL DAILY
Status: COMPLETED | OUTPATIENT
Start: 2017-10-03 | End: 2017-10-03

## 2017-10-03 RX ADMIN — OLANZAPINE 7.5 MG: 2.5 TABLET, FILM COATED ORAL at 20:17

## 2017-10-03 RX ADMIN — ENALAPRIL MALEATE 10 MG: 10 TABLET ORAL at 08:39

## 2017-10-03 RX ADMIN — METOPROLOL TARTRATE 25 MG: 25 TABLET ORAL at 08:39

## 2017-10-03 RX ADMIN — VITAMIN D, TAB 1000IU (100/BT) 5000 UNITS: 25 TAB at 08:39

## 2017-10-03 RX ADMIN — ARIPIPRAZOLE 10 MG: 10 TABLET ORAL at 14:00

## 2017-10-03 RX ADMIN — PANTOPRAZOLE SODIUM 40 MG: 40 TABLET, DELAYED RELEASE ORAL at 06:22

## 2017-10-03 RX ADMIN — OLANZAPINE 7.5 MG: 2.5 TABLET, FILM COATED ORAL at 08:38

## 2017-10-03 RX ADMIN — LEVOTHYROXINE SODIUM 50 MCG: 50 TABLET ORAL at 06:22

## 2017-10-03 RX ADMIN — ATORVASTATIN CALCIUM 20 MG: 20 TABLET, FILM COATED ORAL at 20:15

## 2017-10-03 RX ADMIN — HYDROCHLOROTHIAZIDE 25 MG: 25 TABLET ORAL at 08:38

## 2017-10-03 NOTE — PLAN OF CARE
Problem: BH Patient Care Overview (Adult)  Goal: Individualization and Mutuality  Outcome: Ongoing (interventions implemented as appropriate)

## 2017-10-03 NOTE — PLAN OF CARE
Problem: BH Overarching Goals  Goal: Develops/Participates in Therapeutic [place] to Support Successful Transition  Outcome: Ongoing (interventions implemented as appropriate)

## 2017-10-03 NOTE — PROGRESS NOTES
10/3/2017    Chief Complaint: psychosis    Subjective:  Patient is a 42 y.o. male who was hospitalized for psychosis.   Patient continues to have minimal insight.  Attempted to discuss the dangers of trying cut out the snakes from his abdomen.  Discussed possibility of death from such an action.  He did not seem to understand the risk and stated that if he did it he would know what he was doing and he would be fine.  He denied that he was feeling snakes in her abdomen, however.  He does appear to have some vague sense that such a belief is delusional.  His responses about taking medications on discharge and his prior history of medication non-compliance do not engender confidence.    Objective     Vital Signs    Temp:  [95.5 °F (35.3 °C)-97.2 °F (36.2 °C)] 95.5 °F (35.3 °C)  Heart Rate:  [62-69] 66  Resp:  [18] 18  BP: (124-130)/(59-80) 130/80    Physical Exam:   General Appearance: alert, appears stated age and cooperative,  Hygiene:   good  Gait & Station: Normal  Musculoskeletal: No tremors or abnormal involuntary movements    Mental Status Exam:   Cooperation:  Cooperative  Eye Contact:  Good  Psychomotor Behavior:  Appropriate  Mood: jovial  Affect:  mood-congruent  Speech:  Normal  Thought Process:  Coherent  Associations: Goal Directed  Thought Content:     Bizarre   Suicidal:  None   Homicidal:  None   Hallucinations:  Not demonstrated today   Delusion:  Continued delusional type thinking.  Cognitive Functioning:   Consciousness: awake and alert  Reliability:  fair  Insight:  Poor  Judgement:  Fair  Impulse Control:  Good    Lab Results (last 24 hours)     Procedure Component Value Units Date/Time    POC Glucose Fingerstick [[id number]]  (Normal) Collected:  10/02/17 1550    Specimen:  Blood Updated:  10/02/17 1601     Glucose 119 mg/dL       RN NotifiedMeter: [id number]: [id number] MAICOL AZULELY       POC Glucose Fingerstick [[id number]]  (Normal) Collected:  10/02/17 1958    Specimen:  Blood  Updated:  10/02/17 2011     Glucose 111 mg/dL       RN NotifiedTIERA RNMeter: [id number]: [id number] ISHMAEL CHRISTIAN       POC Glucose Fingerstick [[id number]]  (Normal) Collected:  10/03/17 0622    Specimen:  Blood Updated:  10/03/17 0634     Glucose 113 mg/dL       RN NotifArti RNMeter: [id number]: [id number] ISHMAEL ACOSTAECCA       POC Glucose Fingerstick [[id number]]  (Normal) Collected:  10/03/17 1101    Specimen:  Blood Updated:  10/03/17 1113     Glucose 120 mg/dL       RN NotifiedMeter: [id number]: [id number] MERISSA RITA       POC Glucose Fingerstick [[id number]]  (Normal) Collected:  10/03/17 1219    Specimen:  Blood Updated:  10/03/17 1230     Glucose 102 mg/dL       Meter: [id number]: [id number] MERISSA RITA           Imaging Results (last 24 hours)     ** No results found for the last 24 hours. **          Medicine:   Current Facility-Administered Medications:   •  acetaminophen (TYLENOL) tablet 650 mg, 650 mg, Oral, Q4H PRN, Amaury Hedrick MD  •  aluminum-magnesium hydroxide-simethicone (MAALOX MAX) 400-400-40 MG/5ML suspension 15 mL, 15 mL, Oral, Q6H PRN, Amaury Hedrick MD  •  atorvastatin (LIPITOR) tablet 20 mg, 20 mg, Oral, Nightly, SARIKA Hines, 20 mg at 10/02/17 2140  •  cholecalciferol (VITAMIN D3) tablet 5,000 Units, 5,000 Units, Oral, Daily, SARIKA Hines, 5,000 Units at 10/03/17 0839  •  CloNIDine (CATAPRES) tablet 0.1 mg, 0.1 mg, Oral, Q4H PRN, Amaury Hedrick MD  •  dextrose (D50W) solution 25 g, 25 g, Intravenous, Q15 Min PRN, Noble Conteh MD  •  dextrose (GLUTOSE) oral gel 15 g, 15 g, Oral, Q15 Min PRN, Noble Conteh MD  •  enalapril (VASOTEC) tablet 10 mg, 10 mg, Oral, Daily, Casi Leblanc, APRN, 10 mg at 10/03/17 0839  •  glucagon (human recombinant) (GLUCAGEN DIAGNOSTIC) injection 1 mg, 1 mg, Subcutaneous, Q15 Min PRN, Noble Conteh MD  •  hydrochlorothiazide (HYDRODIURIL) tablet 25 mg,  25 mg, Oral, Daily, Casi Leblanc APRN, 25 mg at 10/03/17 0838  •  hydrOXYzine (VISTARIL) capsule 50 mg, 50 mg, Oral, Q6H PRN, Amaury Hedrick MD, 50 mg at 10/01/17 1529  •  insulin aspart (novoLOG) injection 0-9 Units, 0-9 Units, Subcutaneous, 4x Daily AC & at Bedtime, Noble Conteh MD  •  levothyroxine (SYNTHROID, LEVOTHROID) tablet 50 mcg, 50 mcg, Oral, Q AM, Casi Leblanc APRN, 50 mcg at 10/03/17 0622  •  loperamide (IMODIUM) capsule 2 mg, 2 mg, Oral, 4x Daily PRN, Amaury Hedrick MD  •  magnesium hydroxide (MILK OF MAGNESIA) suspension 2400 mg/10mL 10 mL, 10 mL, Oral, Daily PRN, Amaury Hedrick MD  •  metoprolol tartrate (LOPRESSOR) tablet 25 mg, 25 mg, Oral, BID, Casi Leblanc APRN, 25 mg at 10/03/17 0839  •  OLANZapine (zyPREXA) tablet 7.5 mg, 7.5 mg, Oral, BID, Amaury Hedrick MD, 7.5 mg at 10/03/17 0838  •  pantoprazole (PROTONIX) EC tablet 40 mg, 40 mg, Oral, QAM, Casi Leblanc APRN, 40 mg at 10/03/17 0622  •  traZODone (DESYREL) tablet 50 mg, 50 mg, Oral, Nightly PRN, Amaury Hedrick MD    Diagnoses/Assessment:   Principal Problem:    Bipolar I disorder, current or most recent episode manic, with psychotic features  Active Problems:    Gastro-esophageal reflux disease with esophagitis      Treatment Plan:    1) Will continue care for the patient on the behavioral health unit at [facility] to ensure patient safety.  2) Will continue to provide treatment with the unit milieu, activities, therapies and psychopharmacological management.  3) Patient to be placed on or continued on  Q15 minute checks  and Suicide precautions.  4) Will continue medical management by Dr. Conteh.  5) Will order following labs: none  6) Will make the following medication changes: Will continue the zyprexa for now.  Will start abilify 10mg and increase to 20mg and plan give abilify shot tomorrow.  7) Will continue discharge planning as appropriate for patient.  8)  Psychotherapy provided for 16-37 minutes.  Provided family session with sister and patient.    Treatment plan and medication risks and benefits discussed with: Patient and Family    Amaury Hedrick MD  10/03/17  1:18 PM

## 2017-10-03 NOTE — PLAN OF CARE
Problem: BH Overarching Goals  Goal: Adheres to Safety Considerations for Self and Others  Outcome: Ongoing (interventions implemented as appropriate)  Goal: Optimized Coping Skills in Response to Life Stressors  Outcome: Ongoing (interventions implemented as appropriate)  Goal: Develops/Participates in Therapeutic [place] to Support Successful Transition  Outcome: Ongoing (interventions implemented as appropriate)    Problem: Alteration in Thoughts and Perception  Goal: Treatment Goal: Gain control of psychotic behaviors/thinking, reduce/eliminate presenting symptoms and demonstrate improved reality functioning upon discharge  Outcome: Ongoing (interventions implemented as appropriate)  Goal: Refrain from acting on delusional thinking/internal stimuli  Outcome: Ongoing (interventions implemented as appropriate)  Goal: Agree to be compliant with medication regime, as prescribed and report medication side effects  Outcome: Ongoing (interventions implemented as appropriate)  Goal: Attend and participate in unit activities, including therapeutic, recreational, and educational groups  Outcome: Ongoing (interventions implemented as appropriate)

## 2017-10-03 NOTE — PLAN OF CARE
Problem: BH Patient Care Overview (Adult)  Goal: Discharge Needs Assessment  Outcome: Ongoing (interventions implemented as appropriate)

## 2017-10-03 NOTE — NURSING NOTE
"Behavior   Anxiety 6  Depression 5  Pain 0  AVH no  S/I no  H/I no  Pleasant and cooperative. Quiet. C/o anxiety/depression. \"I didn't sleep good last night.I havent talked to my wife since denae been here.\" good eye contact but speech is hesitate at times. Participating in group. Denies cobra in his stomach.          Intervention  Instructed in med usage and effects, encouraged to verbalize needs. Meds administered as ordered.encouraged pt to increase participation with peers.          Response  Verbalized understanding           Plan  Continue to monitor for safety/  every 15 minute monitoring checks.  "

## 2017-10-03 NOTE — NURSING NOTE
Behavior   Anxiety 0  Depression 0  Pain 0  AVH no  S/I no  H/I no    Patient remains quiet on unit, he interacts with peers on approach.  Denies any complaints, continues to want to go home.  States he hopes to have family session eron. So he can go home.          Intervention  Offered snack, gave hs meds.  Instructed in med usage and effects, encouraged to verbalize needs. Meds administered as ordered.          Response  Patient is cooperative with staff.  Ate hs snack, took hs meds without difficulty.  BS at  was 111.  Verbalized understanding           Plan  Continue monitoring BS.  Continue to monitor for safety/  every 15 minute monitoring checks.

## 2017-10-03 NOTE — CONSULTS
"Adult Nutrition  Assessment    Patient Name:  Kuldip Castaneda  YOB: 1975  MRN: [id number]  Admit Date:  9/26/2017    Assessment Date:  10/3/2017    Comments:  Pt reports good appetite.  Voiced no food preferences.  Reported 70 lb intentional wt loss over the past 1+ year achieved by watching his carbohydrate intake.  Intake 100%, - 13x, 75% - 1x, 50% - 1x, 0% - 2x.  Blood glucose is routinely elevated.  Sliding scale novolog prescribed.          Reason for Assessment       10/03/17 1312    Reason for Assessment    Reason For Assessment/Visit per organizational policy;length of stay                  Anthropometrics       10/03/17 1313    Anthropometrics    Height 180.3 cm (70.98\")    Weight 119 kg (262 lb 5.6 oz)    Ideal Body Weight (IBW)    Ideal Body Weight (IBW), Male (kg) 79.23    % Ideal Body Weight 150.51    Body Mass Index (BMI)    BMI (kg/m2) 36.68    BMI Grade 35 - 39.9 - obesity - grade II            Labs/Tests/Procedures/Meds       10/03/17 1314    Labs/Tests/Procedures/Meds    Labs/Tests Review Glucose    Medication Review Insulin;Reviewed, pertinent              Estimated/Assessed Needs       10/03/17 1314    Calculation Measurements    Weight Used For Calculations 88.4 kg (194 lb 14.2 oz)    Height Used for Calculations 1.803 m (5' 10.98\")    Estimated/Assessed Energy Needs    Energy Need Method Bailey-St Jeor    Age 42    RMR (Bailey-St. Jeor Equation) 1805.87    Activity Factors (Bailey St Jeor)  Out of bed, ambulatory  1.3    Total estimated needs (Bailey St. Jeor) 2347    Estimated/Assessed Protein Needs    Weight Used for Protein Calculation 88.4 kg (194 lb 14.2 oz)    Estimated Protein Range 88 - 106    Estimated/Assessed Fluid Needs    Fluid Need Method --   7552            Nutrition Prescription Ordered       10/03/17 1318    Nutrition Prescription PO    Current PO Diet Regular            Evaluation of Received Nutrient/Fluid Intake       10/03/17 1318    Calculation " "Measurements    Weight Used For Calculations 88.4 kg (194 lb 14.2 oz)    Height Used for Calculations 1.803 m (5' 10.98\")    PO Evaluation    Number of Meals 17    % PO Intake 100% - 13x, 75% - 1x, 50% - 1x, 0% - 2x            Electronically signed by:  Olivia Krishnamurthy, RD  10/03/17 1:22 PM  "

## 2017-10-03 NOTE — PLAN OF CARE
Problem: BH Patient Care Overview (Adult)  Goal: Plan of Care Review  Outcome: Ongoing (interventions implemented as appropriate)  Encourage intake.    10/03/17 1327   Coping/Psychosocial Response Interventions   Plan Of Care Reviewed With patient   Patient Care Overview   Progress no change   Outcome Evaluation   Outcome Summary/Follow up Plan initial assessment

## 2017-10-04 VITALS
WEIGHT: 262.35 LBS | DIASTOLIC BLOOD PRESSURE: 70 MMHG | BODY MASS INDEX: 36.73 KG/M2 | RESPIRATION RATE: 18 BRPM | TEMPERATURE: 98.1 F | HEIGHT: 71 IN | HEART RATE: 84 BPM | SYSTOLIC BLOOD PRESSURE: 128 MMHG | OXYGEN SATURATION: 97 %

## 2017-10-04 LAB
GLUCOSE BLDC GLUCOMTR-MCNC: 112 MG/DL (ref 70–130)
GLUCOSE BLDC GLUCOMTR-MCNC: 123 MG/DL (ref 70–130)

## 2017-10-04 PROCEDURE — 82962 GLUCOSE BLOOD TEST: CPT

## 2017-10-04 PROCEDURE — 99238 HOSP IP/OBS DSCHRG MGMT 30/<: CPT | Performed by: NURSE PRACTITIONER

## 2017-10-04 RX ORDER — DEXLANSOPRAZOLE 30 MG/1
30 CAPSULE, DELAYED RELEASE ORAL DAILY
Qty: 30 CAPSULE | Refills: 0 | Status: SHIPPED | OUTPATIENT
Start: 2017-10-04 | End: 2017-10-10 | Stop reason: SDUPTHER

## 2017-10-04 RX ORDER — OLANZAPINE 15 MG/1
15 TABLET ORAL NIGHTLY
Qty: 30 TABLET | Refills: 0 | Status: SHIPPED | OUTPATIENT
Start: 2017-10-04 | End: 2017-10-04

## 2017-10-04 RX ORDER — LEVOTHYROXINE SODIUM 50 MCG
50 TABLET ORAL EVERY MORNING
Qty: 30 TABLET | Refills: 0 | Status: SHIPPED | OUTPATIENT
Start: 2017-10-04 | End: 2017-10-10 | Stop reason: SDUPTHER

## 2017-10-04 RX ORDER — ATORVASTATIN CALCIUM 20 MG/1
20 TABLET, FILM COATED ORAL NIGHTLY
Qty: 30 TABLET | Refills: 0 | Status: SHIPPED | OUTPATIENT
Start: 2017-10-04 | End: 2017-12-28 | Stop reason: SDUPTHER

## 2017-10-04 RX ORDER — ENALAPRIL MALEATE 10 MG/1
10 TABLET ORAL DAILY
Qty: 30 TABLET | Refills: 0 | Status: SHIPPED | OUTPATIENT
Start: 2017-10-04 | End: 2017-10-10 | Stop reason: SDUPTHER

## 2017-10-04 RX ORDER — OLANZAPINE 10 MG/1
10 TABLET ORAL NIGHTLY
Qty: 30 TABLET | Refills: 1 | Status: SHIPPED | OUTPATIENT
Start: 2017-10-04 | End: 2017-10-10

## 2017-10-04 RX ORDER — HYDROCHLOROTHIAZIDE 25 MG/1
25 TABLET ORAL DAILY
Qty: 30 TABLET | Refills: 0 | Status: SHIPPED | OUTPATIENT
Start: 2017-10-04 | End: 2017-12-28 | Stop reason: SDUPTHER

## 2017-10-04 RX ORDER — ERGOCALCIFEROL 1.25 MG/1
50000 CAPSULE ORAL 2 TIMES WEEKLY
Qty: 8 CAPSULE | Refills: 0 | Status: SHIPPED | OUTPATIENT
Start: 2017-10-05 | End: 2017-12-28 | Stop reason: SDUPTHER

## 2017-10-04 RX ORDER — HYDROXYZINE PAMOATE 50 MG/1
50 CAPSULE ORAL EVERY 6 HOURS PRN
Qty: 60 CAPSULE | Refills: 0 | Status: SHIPPED | OUTPATIENT
Start: 2017-10-04 | End: 2017-10-10

## 2017-10-04 RX ADMIN — ENALAPRIL MALEATE 10 MG: 10 TABLET ORAL at 08:06

## 2017-10-04 RX ADMIN — LEVOTHYROXINE SODIUM 50 MCG: 50 TABLET ORAL at 06:26

## 2017-10-04 RX ADMIN — VITAMIN D, TAB 1000IU (100/BT) 5000 UNITS: 25 TAB at 08:06

## 2017-10-04 RX ADMIN — HYDROCHLOROTHIAZIDE 25 MG: 25 TABLET ORAL at 08:06

## 2017-10-04 RX ADMIN — PANTOPRAZOLE SODIUM 40 MG: 40 TABLET, DELAYED RELEASE ORAL at 06:26

## 2017-10-04 RX ADMIN — OLANZAPINE 7.5 MG: 2.5 TABLET, FILM COATED ORAL at 08:06

## 2017-10-04 RX ADMIN — METOPROLOL TARTRATE 25 MG: 25 TABLET ORAL at 08:06

## 2017-10-04 NOTE — NURSING NOTE
Behavior   Anxiety 0  Depression 0  Pain 0  AVH no  S/I no  H/I no  Patient continues to isolate in his room.  He came out for snack, attended group with very little participation.  He avoids interaction with peers/staff.  During interview he gave brief answers.  Denies any complaints, states is going home in am with mother.  Does not appear responding to internal stimuli.            Intervention  Given hs snack and hs meds.  Attempted to draw him into conversations with little success.  Instructed in med usage and effects, encouraged to verbalize needs. Meds administered as ordered.          Response  Remains isolative, avoids interacting with others.  Affect flat when talking about being d/c.  Verbalized understanding           Plan  Continue to monitor for safety/  every 15 minute monitoring checks.

## 2017-10-04 NOTE — PLAN OF CARE
Problem: Alteration in Thoughts and Perception  Goal: Refrain from acting on delusional thinking/internal stimuli  Outcome: Ongoing (interventions implemented as appropriate)  Goal: Agree to be compliant with medication regime, as prescribed and report medication side effects  Outcome: Ongoing (interventions implemented as appropriate)  Goal: Attend and participate in unit activities, including therapeutic, recreational, and educational groups  Outcome: Ongoing (interventions implemented as appropriate)
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
## 2017-10-04 NOTE — NURSING NOTE
Discharged home with his mother. He is alert/oriented.  Ready for D/c and he is educated on medications and follow-up plans.   Belongings are sent with him.

## 2017-10-04 NOTE — PLAN OF CARE
Problem: BH Patient Care Overview (Adult)  Goal: Plan of Care Review  Outcome: Ongoing (interventions implemented as appropriate)  Goal: Individualization and Mutuality  Outcome: Ongoing (interventions implemented as appropriate)  Goal: Discharge Needs Assessment  Outcome: Ongoing (interventions implemented as appropriate)    Problem: BH Overarching Goals  Goal: Adheres to Safety Considerations for Self and Others  Outcome: Ongoing (interventions implemented as appropriate)  Goal: Optimized Coping Skills in Response to Life Stressors  Outcome: Ongoing (interventions implemented as appropriate)  Goal: Develops/Participates in Therapeutic [place] to Support Successful Transition  Outcome: Ongoing (interventions implemented as appropriate)

## 2017-10-04 NOTE — SIGNIFICANT NOTE
"   10/04/17 1248   Individual Counseling   Time Session Began 1130   Time Session Ended 1145   Total Time (minutes) 15   Topic Discharge/safety plan    Session Detail Pt. met with CSW 1:1 and reviewed safely plan/discharge plan    Patient Response Pt presented in dayroom dressed in scrubs, alert and oriented x3. Mood is hopeful, affect is bright. Pt stated that he will be going home, he said that, \"he feels much better\". He plans to take his medications regularly.. Pt denies SI/Hi, AVH. Pt educated on Crisis Hotline, advised to call as needed. CSW scheduled out pt follow up at [facility] for therapy and med management. BPRS was completed upon dc.     "

## 2017-10-10 ENCOUNTER — OFFICE VISIT (OUTPATIENT)
Dept: FAMILY MEDICINE CLINIC | Facility: CLINIC | Age: 42
End: 2017-10-10

## 2017-10-10 VITALS
WEIGHT: 266 LBS | HEIGHT: 72 IN | SYSTOLIC BLOOD PRESSURE: 128 MMHG | DIASTOLIC BLOOD PRESSURE: 80 MMHG | BODY MASS INDEX: 36.03 KG/M2

## 2017-10-10 DIAGNOSIS — E29.1 DEFICIENCY OF TESTOSTERONE BIOSYNTHESIS: Chronic | ICD-10-CM

## 2017-10-10 DIAGNOSIS — F43.10 PTSD (POST-TRAUMATIC STRESS DISORDER): ICD-10-CM

## 2017-10-10 DIAGNOSIS — E11.9 TYPE 2 DIABETES MELLITUS WITHOUT COMPLICATION, WITHOUT LONG-TERM CURRENT USE OF INSULIN (HCC): Primary | ICD-10-CM

## 2017-10-10 DIAGNOSIS — F31.2 BIPOLAR I DISORDER, CURRENT OR MOST RECENT EPISODE MANIC, WITH PSYCHOTIC FEATURES (HCC): ICD-10-CM

## 2017-10-10 PROCEDURE — 99214 OFFICE O/P EST MOD 30 MIN: CPT | Performed by: FAMILY MEDICINE

## 2017-10-10 RX ORDER — ENALAPRIL MALEATE 10 MG/1
10 TABLET ORAL DAILY
Qty: 30 TABLET | Refills: 0 | Status: SHIPPED | OUTPATIENT
Start: 2017-10-10 | End: 2017-12-28 | Stop reason: SDUPTHER

## 2017-10-10 RX ORDER — TESTOSTERONE CYPIONATE 200 MG/ML
INJECTION, SOLUTION INTRAMUSCULAR
Qty: 3 ML | Refills: 5 | Status: SHIPPED | OUTPATIENT
Start: 2017-10-10 | End: 2017-12-28 | Stop reason: SDDI

## 2017-10-10 RX ORDER — LEVOTHYROXINE SODIUM 50 MCG
50 TABLET ORAL EVERY MORNING
Qty: 30 TABLET | Refills: 0 | Status: SHIPPED | OUTPATIENT
Start: 2017-10-10 | End: 2017-12-28 | Stop reason: SDUPTHER

## 2017-10-10 RX ORDER — DEXLANSOPRAZOLE 30 MG/1
30 CAPSULE, DELAYED RELEASE ORAL DAILY
Qty: 30 CAPSULE | Refills: 0 | Status: SHIPPED | OUTPATIENT
Start: 2017-10-10 | End: 2017-11-09

## 2017-10-10 RX ORDER — CYANOCOBALAMIN 1000 UG/ML
INJECTION, SOLUTION INTRAMUSCULAR; SUBCUTANEOUS
Qty: 2 ML | Refills: 3 | Status: SHIPPED | OUTPATIENT
Start: 2017-10-10 | End: 2018-05-09 | Stop reason: SDUPTHER

## 2017-10-10 RX ORDER — ALPRAZOLAM 0.5 MG/1
0.5 TABLET ORAL NIGHTLY PRN
Qty: 15 TABLET | Refills: 0 | Status: SHIPPED | OUTPATIENT
Start: 2017-10-10 | End: 2017-12-28

## 2017-10-10 RX ORDER — SYRINGE WITH NEEDLE, 1 ML 25GX5/8"
1 SYRINGE, EMPTY DISPOSABLE MISCELLANEOUS
Qty: 1 EACH | Refills: 5 | Status: SHIPPED | OUTPATIENT
Start: 2017-10-10 | End: 2018-05-09 | Stop reason: SDUPTHER

## 2017-10-10 NOTE — PROGRESS NOTES
"Subjective   Kuldip Bossman Castaneda is a 42 y.o. male who presents to the office for follow-up after recent psychiatric hospitalization.  I did receive and review the discharge summary and notes from his hospitalization.  The patient was having irrational thoughts and actions, such as saying that he had cobras in his belly.  It was thought initially that he might have schizophrenia and ultimately that he likely has bipolar disorder with psychosis.  The patient had been having some mental status changes for some time.  He's also said that his wife pointed out to him that he was not acting himself and was making irrational statements.  The patient says that he had been stressed and overtired and without his medications when he went into ER.  However, he had made some irrational statements to me in the months previous to this also.  We discussed this today.  He said that \"someone told him about the cobras in his belly.\"  He was unable to tell me who made the statement to him.  We discussed the fact that this is not rational and he does acknowledge this but also does not feel that he had any altered thinking.  He was prescribed Vistaril and Zyprexa but is not taking either one of them is he said they both made him feel bad.  He would like to have a few Xanax is just to keep with him in case of a panic attack.  The patient says that his wife and daughter are in [place] right now.  He is unsure of how things will go when they return or what the plans will be.    He has been working on his weight and made diet and exercise changes overall and has not noted that his blood sugar has been much better and he has lost some more weight.    Needs refill of his testosterone.  He does feel better when taking it.    History of Present Illness     The following portions of the patient's history were reviewed and updated as appropriate: allergies, current medications, past family history, past medical history, past social history, past " surgical history and problem list.    Review of Systems   Constitutional: Negative for chills, fatigue and fever.   HENT: Negative for congestion, sneezing, sore throat and trouble swallowing.    Eyes: Negative for visual disturbance.   Respiratory: Negative for cough, chest tightness, shortness of breath and wheezing.    Cardiovascular: Negative for chest pain, palpitations and leg swelling.   Gastrointestinal: Negative for abdominal pain, constipation, diarrhea, nausea and vomiting.   Genitourinary: Negative for dysuria, frequency and urgency.   Musculoskeletal: Negative for back pain and neck pain.   Skin: Negative for rash.   Neurological: Negative for dizziness, weakness and headaches.   Psychiatric/Behavioral: Positive for behavioral problems and hallucinations. The patient is nervous/anxious.             All other systems reviewed and are negative.      Objective   Physical Exam   Constitutional: He appears well-developed and well-nourished. He is cooperative. He does not have a sickly appearance. He does not appear ill.   HENT:   Head: Normocephalic and atraumatic.   Right Ear: External ear normal.   Left Ear: External ear normal.   Nose: Nose normal.   Mouth/Throat: Oropharynx is clear and moist.   Eyes: Conjunctivae and EOM are normal. Pupils are equal, round, and reactive to light. Right eye exhibits no discharge. Left eye exhibits no discharge. No scleral icterus.   Neck: Trachea normal, normal range of motion and phonation normal. Neck supple. No thyromegaly present.       Cardiovascular: Normal rate, regular rhythm, normal heart sounds and intact distal pulses.  Exam reveals no gallop and no friction rub.    No murmur heard.  Pulmonary/Chest: Effort normal and breath sounds normal. No respiratory distress. He has no wheezes. He has no rales.   Abdominal: Soft. Normal appearance and bowel sounds are normal. He exhibits no distension. There is no tenderness. There is no rebound and no guarding.      "  Musculoskeletal: Normal range of motion. He exhibits no edema.        Lumbar back: He exhibits pain (chronic, rates pain a 5).       Vascular Status -  His exam exhibits right foot vasculature normal. His exam exhibits no right foot edema. His exam exhibits left foot vasculature normal. His exam exhibits no left foot edema.  Lymphadenopathy:     He has no cervical adenopathy.   Neurological: No cranial nerve deficit.   Skin: Skin is warm and dry. No rash noted.   Psychiatric: He has a normal mood and affect. His behavior is normal. Judgment and thought content normal.   Nursing note and vitals reviewed.      Assessment/Plan   Kuldip was seen today for follow-up.    Diagnoses and all orders for this visit:    Type 2 diabetes mellitus without complication, without long-term current use of insulin    Deficiency of testosterone biosynthesis    PTSD (post-traumatic stress disorder)    Bipolar I disorder, current or most recent episode manic, with psychotic features    Other orders  -     dexlansoprazole (DEXILANT) 30 MG capsule; Take 1 capsule by mouth Daily for 30 days.  -     enalapril (VASOTEC) 10 MG tablet; Take 1 tablet by mouth Daily.  -     SYNTHROID 50 MCG tablet; Take 1 tablet by mouth Every Morning.  -     Testosterone Cypionate (DEPOTESTOTERONE CYPIONATE) 200 MG/ML injection; 100mg every 10 days , Provide(#3)18G,(#3)21Gneedles (#3)3mlsyringes q month  -     Syringe/Needle, Disp, (B-D 3CC LUER-JUAN DIEGO SYR 77QB7-0/2) 22G X 1-1/2\" 3 ML misc; Inject 1 syringe into the shoulder, thigh, or buttocks Every 14 (Fourteen) Days.  -     B-D 3CC LUER-JUAN DIEGO SYR 25GX1/2\" 25G X 1-1/2\" 3 ML misc; Inject 1 mL into the shoulder, thigh, or buttocks Every 30 (Thirty) Days.  -     cyanocobalamin 1000 MCG/ML injection; Inject 1 ml (1,000 mcg) intramuscularly (into the muscle) every 14 days.  -     ALPRAZolam (XANAX) 0.5 MG tablet; Take 1 tablet by mouth At Night As Needed for Anxiety (panic attacks).  Patient was very resistant to the " "idea that he has \"manic depression.\"  I spent some time with him today discussing the nature of bipolar disorder and the need for the correct treatment and compliance with medications.  He has an appointment with psychiatry scheduled and will certainly encourage him to follow-up on this.    I gave a few Xanax tablets that he is to keep with him and use only if needed for panic attacks but not take daily.    We'll continue on testosterone therapy    Continue to monitor blood sugars daily and when needed and recheck on sugar in 3 months       "

## 2017-11-14 ENCOUNTER — DOCUMENTATION (OUTPATIENT)
Dept: PHYSICAL THERAPY | Facility: CLINIC | Age: 42
End: 2017-11-14

## 2017-11-14 NOTE — PROGRESS NOTES
Discharge Summary  Discharge Summary from Physical Therapy Report      Date of eval: 5-17-17  Number of Visits: 2/4    Discharge Status of Patient: Course of prescribed PT met.     Goals: Partially Met    Prognosis: Fair    Comments: D/C to [facility]    Date of Discharge: 11-14-17        Raymon Lyon, VERN  Physical Therapist

## 2017-11-21 ENCOUNTER — OFFICE VISIT (OUTPATIENT)
Dept: FAMILY MEDICINE CLINIC | Facility: CLINIC | Age: 42
End: 2017-11-21

## 2017-11-21 VITALS
OXYGEN SATURATION: 98 % | WEIGHT: 267 LBS | TEMPERATURE: 98.3 F | HEIGHT: 72 IN | DIASTOLIC BLOOD PRESSURE: 76 MMHG | SYSTOLIC BLOOD PRESSURE: 110 MMHG | BODY MASS INDEX: 36.16 KG/M2 | HEART RATE: 75 BPM

## 2017-11-21 DIAGNOSIS — E03.8 HYPOTHYROIDISM DUE TO HASHIMOTO'S THYROIDITIS: ICD-10-CM

## 2017-11-21 DIAGNOSIS — E66.9 CLASS 2 OBESITY WITH BODY MASS INDEX (BMI) OF 36.0 TO 36.9 IN ADULT, UNSPECIFIED OBESITY TYPE, UNSPECIFIED WHETHER SERIOUS COMORBIDITY PRESENT: ICD-10-CM

## 2017-11-21 DIAGNOSIS — R10.84 GENERALIZED ABDOMINAL PAIN: Primary | ICD-10-CM

## 2017-11-21 DIAGNOSIS — E06.3 HYPOTHYROIDISM DUE TO HASHIMOTO'S THYROIDITIS: ICD-10-CM

## 2017-11-21 PROCEDURE — 99214 OFFICE O/P EST MOD 30 MIN: CPT | Performed by: NURSE PRACTITIONER

## 2017-11-22 ENCOUNTER — LAB (OUTPATIENT)
Dept: LAB | Facility: OTHER | Age: 42
End: 2017-11-22

## 2017-11-22 ENCOUNTER — TELEPHONE (OUTPATIENT)
Dept: FAMILY MEDICINE CLINIC | Facility: CLINIC | Age: 42
End: 2017-11-22

## 2017-11-22 DIAGNOSIS — E66.9 CLASS 2 OBESITY WITH BODY MASS INDEX (BMI) OF 36.0 TO 36.9 IN ADULT, UNSPECIFIED OBESITY TYPE, UNSPECIFIED WHETHER SERIOUS COMORBIDITY PRESENT: ICD-10-CM

## 2017-11-22 DIAGNOSIS — E06.3 HYPOTHYROIDISM DUE TO HASHIMOTO'S THYROIDITIS: ICD-10-CM

## 2017-11-22 DIAGNOSIS — E03.8 HYPOTHYROIDISM DUE TO HASHIMOTO'S THYROIDITIS: ICD-10-CM

## 2017-11-22 DIAGNOSIS — R10.84 GENERALIZED ABDOMINAL PAIN: ICD-10-CM

## 2017-11-22 LAB
ALBUMIN SERPL-MCNC: 4.9 G/DL (ref 3.2–5.5)
ALBUMIN/GLOB SERPL: 1.4 G/DL (ref 1–3)
ALP SERPL-CCNC: 51 U/L (ref 15–121)
ALT SERPL W P-5'-P-CCNC: 24 U/L (ref 10–60)
ANION GAP SERPL CALCULATED.3IONS-SCNC: 14 MMOL/L (ref 5–15)
AST SERPL-CCNC: 20 U/L (ref 10–60)
BACTERIA UR QL AUTO: ABNORMAL /HPF
BILIRUB SERPL-MCNC: 1.2 MG/DL (ref 0.2–1)
BILIRUB UR QL STRIP: NEGATIVE
BUN BLD-MCNC: 19 MG/DL (ref 8–25)
BUN/CREAT SERPL: 14.6 (ref 7–25)
CALCIUM SPEC-SCNC: 10 MG/DL (ref 8.4–10.8)
CHLORIDE SERPL-SCNC: 100 MMOL/L (ref 100–112)
CHOLEST SERPL-MCNC: 154 MG/DL (ref 150–200)
CLARITY UR: CLEAR
CO2 SERPL-SCNC: 24 MMOL/L (ref 20–32)
COLOR UR: YELLOW
CREAT BLD-MCNC: 1.3 MG/DL (ref 0.4–1.3)
DEPRECATED RDW RBC AUTO: 42.4 FL (ref 35.1–43.9)
EOSINOPHIL # BLD MANUAL: 0.11 10*3/MM3 (ref 0–0.7)
EOSINOPHIL NFR BLD MANUAL: 3 % (ref 0–7)
ERYTHROCYTE [DISTWIDTH] IN BLOOD BY AUTOMATED COUNT: 13.9 % (ref 11.5–14.5)
GFR SERPL CREATININE-BSD FRML MDRD: 73 ML/MIN/1.73 (ref 63–147)
GLOBULIN UR ELPH-MCNC: 3.6 GM/DL (ref 2.5–4.6)
GLUCOSE BLD-MCNC: 127 MG/DL (ref 70–100)
GLUCOSE UR STRIP-MCNC: NEGATIVE MG/DL
HCT VFR BLD AUTO: 42 % (ref 39–49)
HDLC SERPL-MCNC: 35 MG/DL (ref 35–100)
HGB BLD-MCNC: 14.5 G/DL (ref 13.7–17.3)
HGB UR QL STRIP.AUTO: ABNORMAL
HYALINE CASTS UR QL AUTO: ABNORMAL /LPF
KETONES UR QL STRIP: ABNORMAL
LDLC SERPL CALC-MCNC: 98 MG/DL
LDLC/HDLC SERPL: 2.8 {RATIO}
LEUKOCYTE ESTERASE UR QL STRIP.AUTO: NEGATIVE
LYMPHOCYTES # BLD MANUAL: 1.9 10*3/MM3 (ref 0.6–4.2)
LYMPHOCYTES NFR BLD MANUAL: 54 % (ref 10–50)
LYMPHOCYTES NFR BLD MANUAL: 8 % (ref 0–12)
MCH RBC QN AUTO: 29.4 PG (ref 26.5–34)
MCHC RBC AUTO-ENTMCNC: 34.5 G/DL (ref 31.5–36.3)
MCV RBC AUTO: 85.2 FL (ref 80–98)
MONOCYTES # BLD AUTO: 0.28 10*3/MM3 (ref 0–0.9)
MUCOUS THREADS URNS QL MICRO: ABNORMAL /HPF
NEUTROPHILS # BLD AUTO: 1.23 10*3/MM3 (ref 2–8.6)
NEUTROPHILS NFR BLD MANUAL: 33 % (ref 37–80)
NEUTS BAND NFR BLD MANUAL: 2 % (ref 0–5)
NITRITE UR QL STRIP: NEGATIVE
PH UR STRIP.AUTO: 5.5 [PH] (ref 5.5–8)
PLATELET # BLD AUTO: 310 10*3/MM3 (ref 150–450)
PMV BLD AUTO: 9 FL (ref 8–12)
POTASSIUM BLD-SCNC: 3.9 MMOL/L (ref 3.4–5.4)
PROT SERPL-MCNC: 8.5 G/DL (ref 6.7–8.2)
PROT UR QL STRIP: ABNORMAL
RBC # BLD AUTO: 4.93 10*6/MM3 (ref 4.37–5.74)
RBC # UR: ABNORMAL /HPF
RBC MORPH BLD: NORMAL
REF LAB TEST METHOD: ABNORMAL
SMALL PLATELETS BLD QL SMEAR: ADEQUATE
SODIUM BLD-SCNC: 138 MMOL/L (ref 134–146)
SP GR UR STRIP: >=1.03 (ref 1–1.03)
SQUAMOUS #/AREA URNS HPF: ABNORMAL /HPF
T4 FREE SERPL-MCNC: 1.52 NG/DL (ref 0.78–2.19)
TRANS CELLS #/AREA URNS HPF: ABNORMAL /HPF
TRIGL SERPL-MCNC: 105 MG/DL (ref 35–160)
TSH SERPL DL<=0.05 MIU/L-ACNC: 4.39 MIU/ML (ref 0.46–4.68)
UROBILINOGEN UR QL STRIP: ABNORMAL
VLDLC SERPL-MCNC: 21 MG/DL
WBC MORPH BLD: NORMAL
WBC NRBC COR # BLD: 3.52 10*3/MM3 (ref 3.2–9.8)
WBC UR QL AUTO: ABNORMAL /HPF

## 2017-11-22 PROCEDURE — 84443 ASSAY THYROID STIM HORMONE: CPT | Performed by: NURSE PRACTITIONER

## 2017-11-22 PROCEDURE — 81001 URINALYSIS AUTO W/SCOPE: CPT | Performed by: NURSE PRACTITIONER

## 2017-11-22 PROCEDURE — 84439 ASSAY OF FREE THYROXINE: CPT | Performed by: NURSE PRACTITIONER

## 2017-11-22 PROCEDURE — 36415 COLL VENOUS BLD VENIPUNCTURE: CPT | Performed by: NURSE PRACTITIONER

## 2017-11-22 PROCEDURE — 80053 COMPREHEN METABOLIC PANEL: CPT | Performed by: NURSE PRACTITIONER

## 2017-11-22 PROCEDURE — 85025 COMPLETE CBC W/AUTO DIFF WBC: CPT | Performed by: NURSE PRACTITIONER

## 2017-11-22 PROCEDURE — 80061 LIPID PANEL: CPT | Performed by: NURSE PRACTITIONER

## 2017-11-29 ENCOUNTER — TELEPHONE (OUTPATIENT)
Dept: FAMILY MEDICINE CLINIC | Facility: CLINIC | Age: 42
End: 2017-11-29

## 2017-12-28 ENCOUNTER — OFFICE VISIT (OUTPATIENT)
Dept: FAMILY MEDICINE CLINIC | Facility: CLINIC | Age: 42
End: 2017-12-28

## 2017-12-28 VITALS
WEIGHT: 267 LBS | SYSTOLIC BLOOD PRESSURE: 132 MMHG | DIASTOLIC BLOOD PRESSURE: 82 MMHG | BODY MASS INDEX: 36.16 KG/M2 | HEIGHT: 72 IN

## 2017-12-28 DIAGNOSIS — F31.2 BIPOLAR I DISORDER, CURRENT OR MOST RECENT EPISODE MANIC, WITH PSYCHOTIC FEATURES (HCC): Primary | ICD-10-CM

## 2017-12-28 PROCEDURE — 99214 OFFICE O/P EST MOD 30 MIN: CPT | Performed by: FAMILY MEDICINE

## 2017-12-28 RX ORDER — ERGOCALCIFEROL 1.25 MG/1
50000 CAPSULE ORAL 2 TIMES WEEKLY
Qty: 8 CAPSULE | Refills: 5 | Status: SHIPPED | OUTPATIENT
Start: 2017-12-28 | End: 2018-05-09 | Stop reason: SDUPTHER

## 2017-12-28 RX ORDER — ENALAPRIL MALEATE 10 MG/1
10 TABLET ORAL DAILY
Qty: 30 TABLET | Refills: 5 | Status: SHIPPED | OUTPATIENT
Start: 2017-12-28 | End: 2018-07-25 | Stop reason: SDUPTHER

## 2017-12-28 RX ORDER — LEVOTHYROXINE SODIUM 50 MCG
50 TABLET ORAL EVERY MORNING
Qty: 30 TABLET | Refills: 5 | Status: SHIPPED | OUTPATIENT
Start: 2017-12-28 | End: 2018-07-25 | Stop reason: SDUPTHER

## 2017-12-28 RX ORDER — ATORVASTATIN CALCIUM 20 MG/1
20 TABLET, FILM COATED ORAL NIGHTLY
Qty: 30 TABLET | Refills: 5 | Status: SHIPPED | OUTPATIENT
Start: 2017-12-28 | End: 2018-07-25 | Stop reason: SDUPTHER

## 2017-12-28 RX ORDER — HYDROCHLOROTHIAZIDE 25 MG/1
25 TABLET ORAL DAILY
Qty: 30 TABLET | Refills: 5 | Status: SHIPPED | OUTPATIENT
Start: 2017-12-28 | End: 2018-07-25 | Stop reason: SDUPTHER

## 2017-12-28 NOTE — PROGRESS NOTES
Subjective   Kuldip Castaneda is a 42 y.o. male who presents to the office for follow-up on bipolar disorder with psychosis, and needs refills of medicines for hypertension and hyperlipidemia diabetes and vitamin D deficiency.    History of Present Illness     The following portions of the patient's history were reviewed and updated as appropriate: allergies, current medications, past family history, past medical history, past social history, past surgical history and problem list.    Review of Systems   Constitutional: Negative for chills, fatigue and fever.   HENT: Negative for congestion, sneezing, sore throat and trouble swallowing.    Eyes: Negative for visual disturbance.   Respiratory: Negative for cough, chest tightness, shortness of breath and wheezing.    Cardiovascular: Negative for chest pain, palpitations and leg swelling.   Gastrointestinal: Negative for abdominal pain, constipation, diarrhea, nausea and vomiting.   Genitourinary: Negative for dysuria, frequency and urgency.   Musculoskeletal: Negative for back pain and neck pain.   Skin: Negative for rash.   Neurological: Negative for dizziness, weakness and headaches.   Psychiatric/Behavioral: Positive for behavioral problems and hallucinations. The patient is nervous/anxious.             All other systems reviewed and are negative.      Objective   Physical Exam   Constitutional: He appears well-developed and well-nourished. He is cooperative. He does not have a sickly appearance. He does not appear ill.   HENT:   Head: Normocephalic and atraumatic.   Right Ear: External ear normal.   Left Ear: External ear normal.   Nose: Nose normal.   Mouth/Throat: Oropharynx is clear and moist.   Eyes: Conjunctivae and EOM are normal. Pupils are equal, round, and reactive to light. Right eye exhibits no discharge. Left eye exhibits no discharge. No scleral icterus.   Neck: Trachea normal, normal range of motion and phonation normal. Neck supple. No thyromegaly  present.       Cardiovascular: Normal rate, regular rhythm, normal heart sounds and intact distal pulses.  Exam reveals no gallop and no friction rub.    No murmur heard.  Pulmonary/Chest: Effort normal and breath sounds normal. No respiratory distress. He has no wheezes. He has no rales.   Abdominal: Soft. Normal appearance and bowel sounds are normal. He exhibits no distension. There is no tenderness. There is no rebound and no guarding.       Musculoskeletal: Normal range of motion. He exhibits no edema.        Lumbar back: He exhibits pain (chronic, rates pain a 5).       Vascular Status -  His exam exhibits right foot vasculature normal. His exam exhibits no right foot edema. His exam exhibits left foot vasculature normal. His exam exhibits no left foot edema.  Lymphadenopathy:     He has no cervical adenopathy.   Neurological: No cranial nerve deficit.   Skin: Skin is warm and dry. No rash noted.   Psychiatric: He has a normal mood and affect. His behavior is normal. Judgment and thought content normal.   Nursing note and vitals reviewed.      Assessment/Plan   Kuldip was seen today for paper work.    Diagnoses and all orders for this visit:    Bipolar I disorder, current or most recent episode manic, with psychotic features    Other orders  -     atorvastatin (LIPITOR) 20 MG tablet; Take 1 tablet by mouth Every Night.  -     enalapril (VASOTEC) 10 MG tablet; Take 1 tablet by mouth Daily.  -     hydrochlorothiazide (HYDRODIURIL) 25 MG tablet; Take 1 tablet by mouth Daily.  -     SYNTHROID 50 MCG tablet; Take 1 tablet by mouth Every Morning.  -     vitamin D (ERGOCALCIFEROL) 24140 units capsule capsule; Take 1 capsule by mouth 2 (Two) Times a Week.  Patient still prefers not to take medication for bipolar mood disorder.  He feels like he is doing better and says even his family has noticed this.  I did warn him about the likelihood that the bipolar symptoms will return.  He will follow-up with me regularly and  contact me sooner for problems    Refilled above meds today including meds for hypertension and hyperlipidemia and vitamin D and hypothyroidism.  Recent labs were checked in the hospital.        This document has been electronically signed by Aura Carrillo MD on December 28, 2017 4:14 PM

## 2018-01-01 NOTE — PROGRESS NOTES
"Subjective   Kuldip Bossman Castaneda is a 42 y.o. male who presents to the office complaining of back pain and feeling weak and tired.  Urine was \"dark this morning.\"  Tells me this has been happening since he was in [facility] in October.  Blood pressure was elevated and was experiencing auditory/visual hallucinations.  Has also been admitted to behavioral health floor in [facility].  Drinks at least 60 ounces of water daily.  Tells me that he controls his DM with diet and exercise.  Has occasional constipation, does have blood in his stool with last episode three days ago.  Denies snoring or gasping in sleep.  I saw daughter yesterday who tested POS for mono.  PCP is Gerardo.  History of Present Illness     The following portions of the patient's history were reviewed and updated as appropriate: allergies, current medications, past family history, past medical history, past social history, past surgical history and problem list.    Review of Systems   Constitutional: Positive for activity change. Negative for chills, fatigue and fever.   HENT: Negative for congestion, sneezing, sore throat and trouble swallowing.    Eyes: Negative for visual disturbance.   Respiratory: Negative for cough, chest tightness, shortness of breath and wheezing.    Cardiovascular: Negative for chest pain, palpitations and leg swelling.   Gastrointestinal: Positive for constipation. Negative for abdominal pain, diarrhea, nausea and vomiting.   Genitourinary: Positive for flank pain. Negative for dysuria, frequency and urgency.   Musculoskeletal: Negative for neck pain.   Skin: Negative for rash.   Neurological: Positive for weakness. Negative for dizziness and headaches.   Psychiatric/Behavioral: Positive for hallucinations.        In the past two weeks the pt has not felt down, depressed, hopeless or lost interest in doing things   All other systems reviewed and are negative.      Objective   Physical Exam   Constitutional: He is oriented to person, " "place, and time. He appears well-developed and well-nourished. He is cooperative.  Non-toxic appearance. He does not have a sickly appearance. He does not appear ill.   HENT:   Head: Normocephalic and atraumatic.   Right Ear: Tympanic membrane and external ear normal.   Left Ear: Tympanic membrane and external ear normal.   Nose: Nose normal.   Mouth/Throat: Uvula is midline, oropharynx is clear and moist and mucous membranes are normal.   Eyes: Conjunctivae, EOM and lids are normal. Pupils are equal, round, and reactive to light. Right eye exhibits no discharge. Left eye exhibits no discharge. No scleral icterus.   Neck: Trachea normal, normal range of motion and phonation normal. Neck supple. No thyromegaly present.   Cardiovascular: Normal rate, regular rhythm, normal heart sounds and intact distal pulses.  Exam reveals no gallop and no friction rub.    No murmur heard.  Pulmonary/Chest: Effort normal and breath sounds normal. No respiratory distress. He has no wheezes. He has no rales.   Abdominal: Soft. Normal appearance and bowel sounds are normal. He exhibits no distension. There is generalized tenderness (will nonchalantly say \"ow\" whenever I push on his abd). There is no rigidity, no rebound, no guarding and no CVA tenderness.   Musculoskeletal: Normal range of motion. He exhibits no edema.   Lymphadenopathy:     He has no cervical adenopathy.   Neurological: He is alert and oriented to person, place, and time. No cranial nerve deficit. GCS eye subscore is 4. GCS verbal subscore is 5. GCS motor subscore is 6.   Skin: Skin is warm, dry and intact. No rash noted.   Psychiatric: His speech is normal and behavior is normal. Judgment and thought content normal. His mood appears not anxious. Cognition and memory are normal. He does not exhibit a depressed mood. He expresses no homicidal and no suicidal ideation. He expresses no suicidal plans and no homicidal plans.   Nursing note and vitals " reviewed.      Assessment/Plan   Kuldip was seen today for follow-up.    Diagnoses and all orders for this visit:    Generalized abdominal pain  -     XR Abdomen Flat & Upright  -     CBC & Differential; Future  -     Comprehensive Metabolic Panel; Future  -     Lipid Panel; Future  -     Urinalysis With / Microscopic If Indicated - Urine, Clean Catch; Future    Hypothyroidism due to Hashimoto's thyroiditis  -     CBC & Differential; Future  -     Comprehensive Metabolic Panel; Future  -     Lipid Panel; Future  -     T4, Free; Future  -     TSH; Future  -     Urinalysis With / Microscopic If Indicated - Urine, Clean Catch; Future    Class 2 obesity with body mass index (BMI) of 36.0 to 36.9 in adult, unspecified obesity type, unspecified whether serious comorbidity present  -     CBC & Differential; Future  -     Comprehensive Metabolic Panel; Future  -     Lipid Panel; Future  -     Urinalysis With / Microscopic If Indicated - Urine, Clean Catch; Future    May have become fixated on symptoms as his daughter just diagnosed with mono yesterday.  Looking over his chart I see he has extensive behavioral health issues.  Informed that labs are non-acute, should certainly maintain fluids, good handwashing.  BRAT diet and adv as tolerated.

## 2018-03-13 RX ORDER — OLANZAPINE 10 MG/1
10 TABLET ORAL NIGHTLY
Qty: 30 TABLET | Refills: 5 | Status: SHIPPED | OUTPATIENT
Start: 2018-03-13 | End: 2018-07-25 | Stop reason: SDUPTHER

## 2018-03-13 RX ORDER — HYDROXYZINE PAMOATE 50 MG/1
50 CAPSULE ORAL 4 TIMES DAILY PRN
Qty: 120 CAPSULE | Refills: 5 | Status: SHIPPED | OUTPATIENT
Start: 2018-03-13 | End: 2018-07-25 | Stop reason: SDUPTHER

## 2018-05-09 ENCOUNTER — OFFICE VISIT (OUTPATIENT)
Dept: FAMILY MEDICINE CLINIC | Facility: CLINIC | Age: 43
End: 2018-05-09

## 2018-05-09 VITALS
BODY MASS INDEX: 41.58 KG/M2 | DIASTOLIC BLOOD PRESSURE: 86 MMHG | WEIGHT: 307 LBS | HEIGHT: 72 IN | SYSTOLIC BLOOD PRESSURE: 122 MMHG

## 2018-05-09 DIAGNOSIS — E53.8 B12 DEFICIENCY: ICD-10-CM

## 2018-05-09 DIAGNOSIS — E55.9 VITAMIN D DEFICIENCY: ICD-10-CM

## 2018-05-09 DIAGNOSIS — E29.1 DEFICIENCY OF TESTOSTERONE BIOSYNTHESIS: Chronic | ICD-10-CM

## 2018-05-09 DIAGNOSIS — IMO0001 CLASS 3 OBESITY WITH SERIOUS COMORBIDITY AND BODY MASS INDEX (BMI) OF 40.0 TO 44.9 IN ADULT, UNSPECIFIED OBESITY TYPE: ICD-10-CM

## 2018-05-09 DIAGNOSIS — F25.9 SCHIZOAFFECTIVE DISORDER, UNSPECIFIED TYPE (HCC): Primary | ICD-10-CM

## 2018-05-09 PROCEDURE — 99214 OFFICE O/P EST MOD 30 MIN: CPT | Performed by: FAMILY MEDICINE

## 2018-05-09 RX ORDER — CYANOCOBALAMIN 1000 UG/ML
INJECTION, SOLUTION INTRAMUSCULAR; SUBCUTANEOUS
Qty: 2 ML | Refills: 3 | Status: SHIPPED | OUTPATIENT
Start: 2018-05-09 | End: 2018-07-25 | Stop reason: SDUPTHER

## 2018-05-09 RX ORDER — TESTOSTERONE CYPIONATE 100 MG/ML
INJECTION, SOLUTION INTRAMUSCULAR
COMMUNITY
End: 2018-05-09

## 2018-05-09 RX ORDER — SYRINGE WITH NEEDLE, 1 ML 25GX5/8"
1 SYRINGE, EMPTY DISPOSABLE MISCELLANEOUS
Qty: 1 EACH | Refills: 5 | Status: SHIPPED | OUTPATIENT
Start: 2018-05-09 | End: 2019-08-14 | Stop reason: SDDI

## 2018-05-09 RX ORDER — ALPRAZOLAM 2 MG/1
2 TABLET ORAL NIGHTLY PRN
Qty: 30 TABLET | Refills: 2 | Status: SHIPPED | OUTPATIENT
Start: 2018-05-09 | End: 2018-07-25 | Stop reason: SDUPTHER

## 2018-05-09 RX ORDER — TESTOSTERONE CYPIONATE 100 MG/ML
100 INJECTION, SOLUTION INTRAMUSCULAR
Qty: 0.84 ML | Refills: 2 | Status: SHIPPED | OUTPATIENT
Start: 2018-05-09 | End: 2018-07-25 | Stop reason: SDUPTHER

## 2018-05-09 RX ORDER — ERGOCALCIFEROL 1.25 MG/1
50000 CAPSULE ORAL 2 TIMES WEEKLY
Qty: 8 CAPSULE | Refills: 5 | Status: SHIPPED | OUTPATIENT
Start: 2018-05-10 | End: 2018-07-25 | Stop reason: SDUPTHER

## 2018-05-09 NOTE — PROGRESS NOTES
Subjective   Kuldip Castaneda is a 42 y.o. male who presents to the office for follow-up on bipolar disorder with psychosis, and needs refills of medicines for hypertension and hyperlipidemia diabetes and vitamin D deficiency.  He been off of some of his medications, including B12, testosterone, and vitamin D.  He noticed that he had muscle weakness and fatigue and like to go back on these.  Also, he's concerned about weight gain.  The Zyprexa seems to have helped with his psychiatric symptoms besides significant weight gain since he's been on it.  He has some occasional panic attacks and would like Xanax to take.  He doesn't take it daily or regularly.    History of Present Illness   Obesity   This is a chronic problem. The current episode started more than 1 year ago. The problem occurs constantly. The problem has been gradually worsening. Associated symptoms include arthralgias and fatigue. Pertinent negatives include no abdominal pain, anorexia, change in bowel habit, chest pain, chills, congestion, coughing, diaphoresis, fever, headaches, joint swelling, myalgias, nausea, neck pain, numbness, rash, sore throat, swollen glands, urinary symptoms, vertigo, visual change, vomiting or weakness. The symptoms are aggravated by eating. Treatments tried: diet, exercise. The treatment provided mild relief.     The following portions of the patient's history were reviewed and updated as appropriate: allergies, current medications, past family history, past medical history, past social history, past surgical history and problem list.    Review of Systems   HENT: Negative for sneezing and trouble swallowing.    Eyes: Negative for visual disturbance.   Respiratory: Negative for chest tightness, shortness of breath and wheezing.    Cardiovascular: Negative for palpitations and leg swelling.   Gastrointestinal: Negative for constipation and diarrhea.   Genitourinary: Negative for dysuria, frequency and urgency.    Musculoskeletal: Negative for back pain.   Neurological: Negative for dizziness.   Psychiatric/Behavioral: Positive for behavioral problems and hallucinations. The patient is nervous/anxious.             All other systems reviewed and are negative.      Objective   Physical Exam   Constitutional: He appears well-developed and well-nourished. He is cooperative. He does not have a sickly appearance. He does not appear ill.   HENT:   Head: Normocephalic and atraumatic.   Right Ear: External ear normal.   Left Ear: External ear normal.   Nose: Nose normal.   Mouth/Throat: Oropharynx is clear and moist.   Eyes: Conjunctivae and EOM are normal. Pupils are equal, round, and reactive to light. Right eye exhibits no discharge. Left eye exhibits no discharge. No scleral icterus.   Neck: Trachea normal, normal range of motion and phonation normal. Neck supple. No thyromegaly present.       Cardiovascular: Normal rate, regular rhythm, normal heart sounds and intact distal pulses.  Exam reveals no gallop and no friction rub.    No murmur heard.  Pulmonary/Chest: Effort normal and breath sounds normal. No respiratory distress. He has no wheezes. He has no rales.   Abdominal: Soft. Normal appearance and bowel sounds are normal. He exhibits no distension. There is no tenderness. There is no rebound and no guarding.       Musculoskeletal: Normal range of motion. He exhibits no edema.        Lumbar back: He exhibits pain (chronic, rates pain a 5).     Vascular Status -  His right foot exhibits abnormal foot vasculature . His right foot exhibits no edema. His left foot exhibits abnormal foot vasculature . His left foot exhibits no edema.  Lymphadenopathy:     He has no cervical adenopathy.   Neurological: No cranial nerve deficit.   Skin: Skin is warm and dry. No rash noted.   Psychiatric: He has a normal mood and affect. His behavior is normal. Judgment and thought content normal.   Nursing note and vitals  "reviewed.      Assessment/Plan   Kuldip was seen today for follow-up, weight gain and nausea.    Diagnoses and all orders for this visit:    Schizoaffective disorder, unspecified type    Class 3 obesity with serious comorbidity and body mass index (BMI) of 40.0 to 44.9 in adult, unspecified obesity type    B12 deficiency    Deficiency of testosterone biosynthesis    Vitamin D deficiency    Other orders  -     cyanocobalamin 1000 MCG/ML injection; Inject 1 ml (1,000 mcg) intramuscularly (into the muscle) every 14 days.  -     vitamin D (ERGOCALCIFEROL) 68310 units capsule capsule; Take 1 capsule by mouth 2 (Two) Times a Week.  -     B-D 3CC LUER-JUAN DIEGO SYR 25GX1/2\" 25G X 1-1/2\" 3 ML misc; Inject 1 mL into the shoulder, thigh, or buttocks Every 30 (Thirty) Days.  -     Syringe/Needle, Disp, (B-D SYRINGE/NEEDLE 1CC/25GX5/8) 25G X 5/8\" 1 ML misc; FOR USE WITH VIT B-12 SHOT  -     testosterone cypionate (DEPO-TESTOSTERONE) 100 MG/ML solution injection; Inject 1 mL into the shoulder, thigh, or buttocks Every 14 (Fourteen) Days.  -     Liraglutide -Weight Management (SAXENDA) 18 MG/3ML solution pen-injector; Inject 3 mg under the skin Daily for 180 days. Start with 0.6mg daily for 1 week, then 1.2mg daily, then 1.8mg daily  -     ALPRAZolam (XANAX) 2 MG tablet; Take 1 tablet by mouth At Night As Needed for Anxiety.    Continue Zyprexa for mood symptoms and will add Saxenda to help with weight loss to get some of the weight off that likely the Zyprexa has caused.    We'll go back on vitamin D, testosterone, and B12 and follow up with me in 3 months.  We'll continue to follow labs for all the above    Gave Xanax to use only when needed for panic attacks.  He's been using it very rarely, as directed.  The patient has read and signed the [facility] Controlled Substance Contract.  I will continue to see patient for regular follow up appointments. Patient is well controlled on the medication.  MARY has been reviewed by me and " is updated every 3 months. The patient is aware of the potential for addiction and dependence.     .      This document has been electronically signed by Aura Carrillo MD on May 9, 2018 9:18 AM

## 2018-07-25 ENCOUNTER — LAB (OUTPATIENT)
Dept: LAB | Facility: OTHER | Age: 43
End: 2018-07-25

## 2018-07-25 ENCOUNTER — OFFICE VISIT (OUTPATIENT)
Dept: FAMILY MEDICINE CLINIC | Facility: CLINIC | Age: 43
End: 2018-07-25

## 2018-07-25 VITALS
DIASTOLIC BLOOD PRESSURE: 84 MMHG | BODY MASS INDEX: 42.66 KG/M2 | WEIGHT: 315 LBS | HEIGHT: 72 IN | SYSTOLIC BLOOD PRESSURE: 122 MMHG

## 2018-07-25 DIAGNOSIS — I10 ESSENTIAL HYPERTENSION: ICD-10-CM

## 2018-07-25 DIAGNOSIS — K21.00 GASTRO-ESOPHAGEAL REFLUX DISEASE WITH ESOPHAGITIS: ICD-10-CM

## 2018-07-25 DIAGNOSIS — R10.12 LEFT UPPER QUADRANT PAIN: Primary | ICD-10-CM

## 2018-07-25 DIAGNOSIS — E78.2 MIXED HYPERLIPIDEMIA: ICD-10-CM

## 2018-07-25 DIAGNOSIS — E11.9 TYPE 2 DIABETES MELLITUS WITHOUT COMPLICATION, WITHOUT LONG-TERM CURRENT USE OF INSULIN (HCC): ICD-10-CM

## 2018-07-25 DIAGNOSIS — F41.1 GENERALIZED ANXIETY DISORDER: ICD-10-CM

## 2018-07-25 DIAGNOSIS — G47.30 SLEEP APNEA, UNSPECIFIED TYPE: ICD-10-CM

## 2018-07-25 DIAGNOSIS — E29.1 DEFICIENCY OF TESTOSTERONE BIOSYNTHESIS: Chronic | ICD-10-CM

## 2018-07-25 DIAGNOSIS — E55.9 VITAMIN D DEFICIENCY: ICD-10-CM

## 2018-07-25 DIAGNOSIS — L30.9 DERMATITIS: ICD-10-CM

## 2018-07-25 PROCEDURE — 86008 ALLG SPEC IGE RECOMB EA: CPT | Performed by: FAMILY MEDICINE

## 2018-07-25 PROCEDURE — 36415 COLL VENOUS BLD VENIPUNCTURE: CPT | Performed by: FAMILY MEDICINE

## 2018-07-25 PROCEDURE — 86003 ALLG SPEC IGE CRUDE XTRC EA: CPT | Performed by: FAMILY MEDICINE

## 2018-07-25 PROCEDURE — 99214 OFFICE O/P EST MOD 30 MIN: CPT | Performed by: FAMILY MEDICINE

## 2018-07-25 RX ORDER — ALPRAZOLAM 2 MG/1
2 TABLET ORAL NIGHTLY PRN
Qty: 30 TABLET | Refills: 2 | Status: SHIPPED | OUTPATIENT
Start: 2018-07-25 | End: 2018-09-13

## 2018-07-25 RX ORDER — ERGOCALCIFEROL 1.25 MG/1
50000 CAPSULE ORAL 2 TIMES WEEKLY
Qty: 8 CAPSULE | Refills: 5 | Status: SHIPPED | OUTPATIENT
Start: 2018-07-26 | End: 2018-10-24 | Stop reason: SDUPTHER

## 2018-07-25 RX ORDER — OLANZAPINE 10 MG/1
10 TABLET ORAL NIGHTLY
Qty: 30 TABLET | Refills: 5 | Status: SHIPPED | OUTPATIENT
Start: 2018-07-25 | End: 2018-09-04

## 2018-07-25 RX ORDER — ENALAPRIL MALEATE 10 MG/1
10 TABLET ORAL DAILY
Qty: 30 TABLET | Refills: 5 | Status: SHIPPED | OUTPATIENT
Start: 2018-07-25 | End: 2018-08-30 | Stop reason: HOSPADM

## 2018-07-25 RX ORDER — HYDROCHLOROTHIAZIDE 25 MG/1
25 TABLET ORAL DAILY
Qty: 30 TABLET | Refills: 5 | Status: SHIPPED | OUTPATIENT
Start: 2018-07-25 | End: 2019-01-24

## 2018-07-25 RX ORDER — HYDROXYZINE PAMOATE 50 MG/1
50 CAPSULE ORAL 4 TIMES DAILY PRN
Qty: 120 CAPSULE | Refills: 5 | Status: SHIPPED | OUTPATIENT
Start: 2018-07-25 | End: 2021-01-28

## 2018-07-25 RX ORDER — ATORVASTATIN CALCIUM 20 MG/1
20 TABLET, FILM COATED ORAL NIGHTLY
Qty: 30 TABLET | Refills: 5 | Status: SHIPPED | OUTPATIENT
Start: 2018-07-25 | End: 2020-08-07 | Stop reason: SDUPTHER

## 2018-07-25 RX ORDER — ESOMEPRAZOLE MAGNESIUM 40 MG/1
40 CAPSULE, DELAYED RELEASE ORAL
Qty: 30 CAPSULE | Refills: 5 | Status: SHIPPED | OUTPATIENT
Start: 2018-07-25 | End: 2019-03-22 | Stop reason: SDUPTHER

## 2018-07-25 RX ORDER — LEVOTHYROXINE SODIUM 50 MCG
50 TABLET ORAL EVERY MORNING
Qty: 30 TABLET | Refills: 5 | Status: SHIPPED | OUTPATIENT
Start: 2018-07-25 | End: 2018-09-13 | Stop reason: CLARIF

## 2018-07-25 RX ORDER — CYANOCOBALAMIN 1000 UG/ML
INJECTION, SOLUTION INTRAMUSCULAR; SUBCUTANEOUS
Qty: 2 ML | Refills: 3 | Status: SHIPPED | OUTPATIENT
Start: 2018-07-25 | End: 2018-10-24 | Stop reason: SDUPTHER

## 2018-07-25 RX ORDER — TESTOSTERONE CYPIONATE 100 MG/ML
100 INJECTION, SOLUTION INTRAMUSCULAR
Qty: 0.84 ML | Refills: 2 | Status: SHIPPED | OUTPATIENT
Start: 2018-07-25 | End: 2018-08-30 | Stop reason: HOSPADM

## 2018-07-25 NOTE — PROGRESS NOTES
Subjective   Kuldipbret Castaneda is a 42 y.o. male who presents to the office for concern about some abdominal pain.  He says that he has been having a swelling and bloating feeling in something feels like it's protruding in his abdomen causing discomfort.  It is not necessarily triggered by meals.  He does note some reflux and indigestion with heartburn.  He's had no change in his bowel habits.  Note that he's had some skin lesions come up across the abdomen and occasionally some rashes and itching in other areas of the skin and he has had some tick bites.    He had an EGD and colonoscopy about a year and a half ago.  Colonoscopy was negative except for some external hemorrhoids but EGD that showed some fairly severe esophagitis and gastritis and he is not on any type of reflux or acid medication at this time.  He's also had a CT and an ultrasound of the abdomen within the past couple years which were basically negative.    Also, his cardiologist is concerned about sleep apnea and wants him to have a sleep study.    He needs a refill of medications including medicines for lipids, anxiety, hypothyroidism, hypertension, and testosterone    Has a history of diabetes, currently has been well-controlled on diet alone.     History of Present Illness   Anxiety   Presents for follow-up visit. Symptoms include decreased concentration, depressed mood, excessive worry, irritability, muscle tension, nervous/anxious behavior, palpitations, panic and restlessness. Patient reports no chest pain, compulsions, confusion, dizziness, dry mouth, feeling of choking, hyperventilation, impotence, insomnia, malaise, nausea, obsessions, shortness of breath or suicidal ideas. Symptoms occur most days. The severity of symptoms is severe and causing significant distress. The quality of sleep is fair. Nighttime awakenings: occasional.     Compliance with medications is %.     The following portions of the patient's history were reviewed  and updated as appropriate: allergies, current medications, past family history, past medical history, past social history, past surgical history and problem list.    Review of Systems   HENT: Negative for sneezing and trouble swallowing.    Eyes: Negative for visual disturbance.   Respiratory: Negative for chest tightness, shortness of breath and wheezing.    Cardiovascular: Negative for palpitations and leg swelling.   Gastrointestinal: Positive for abdominal distention and abdominal pain. Negative for constipation and diarrhea.   Genitourinary: Negative for dysuria, frequency and urgency.   Musculoskeletal: Negative for back pain.   Neurological: Negative for dizziness.   Psychiatric/Behavioral: Negative for behavioral problems. The patient is not nervous/anxious.             All other systems reviewed and are negative.      Objective   Physical Exam   Constitutional: He appears well-developed and well-nourished. He is cooperative. He does not have a sickly appearance. He does not appear ill.   HENT:   Head: Normocephalic and atraumatic.   Right Ear: External ear normal.   Left Ear: External ear normal.   Nose: Nose normal.   Mouth/Throat: Oropharynx is clear and moist.   Eyes: Pupils are equal, round, and reactive to light. Conjunctivae and EOM are normal. Right eye exhibits no discharge. Left eye exhibits no discharge. No scleral icterus.   Neck: Trachea normal, normal range of motion and phonation normal. Neck supple. No thyromegaly present.       Cardiovascular: Normal rate, regular rhythm, normal heart sounds and intact distal pulses.  Exam reveals no gallop and no friction rub.    No murmur heard.  Pulmonary/Chest: Effort normal and breath sounds normal. No respiratory distress. He has no wheezes. He has no rales.   Abdominal: Soft. Normal appearance and bowel sounds are normal. He exhibits no distension. There is no tenderness. There is no rebound and no guarding.   Mild tenderness to deep palpation left  "upper and lower quadrant but no masses rebound or guarding    Musculoskeletal: Normal range of motion. He exhibits no edema.        Lumbar back: Pain: chronic, rates pain a 5.     Vascular Status -  His right foot exhibits abnormal foot vasculature . His right foot exhibits no edema. His left foot exhibits abnormal foot vasculature . His left foot exhibits no edema.  Lymphadenopathy:     He has no cervical adenopathy.   Neurological: No cranial nerve deficit.   Skin: Skin is warm and dry. No rash noted.   Psychiatric: He has a normal mood and affect. His behavior is normal. Judgment and thought content normal.   Nursing note and vitals reviewed.      Assessment/Plan   Kuldip was seen today for follow-up, med refill and bloated.    Diagnoses and all orders for this visit:    Left upper quadrant pain    Dermatitis  -     Alpha - Gal Panel; Future    Sleep apnea, unspecified type  -     Ambulatory Referral to Sleep Medicine    Mixed hyperlipidemia    Essential hypertension    Deficiency of testosterone biosynthesis  -     Testosterone, Free, Total    Generalized anxiety disorder    Gastro-esophageal reflux disease with esophagitis    Type 2 diabetes mellitus without complication, without long-term current use of insulin (CMS/[facility])  -     Comprehensive Metabolic Panel  -     CBC & Differential; Future  -     Hemoglobin A1c  -     Lipid Panel; Future  -     T4, Free  -     TSH    Vitamin D deficiency  -     Vitamin D 25 Hydroxy    Other orders  -     testosterone cypionate (DEPO-TESTOSTERONE) 100 MG/ML solution injection; Inject 1 mL into the appropriate muscle as directed by prescriber Every 14 (Fourteen) Days.  -     Syringe/Needle, Disp, (B-D SYRINGE/NEEDLE 1CC/25GX5/8) 25G X 5/8\" 1 ML misc; FOR USE WITH VIT B-12 SHOT  -     vitamin D (ERGOCALCIFEROL) 12965 units capsule capsule; Take 1 capsule by mouth 2 (Two) Times a Week.  -     SYNTHROID 50 MCG tablet; Take 1 tablet by mouth Every Morning.  -     OLANZapine " (ZYPREXA) 10 MG tablet; Take 1 tablet by mouth Every Night.  -     ALPRAZolam (XANAX) 2 MG tablet; Take 1 tablet by mouth At Night As Needed for Anxiety.  -     atorvastatin (LIPITOR) 20 MG tablet; Take 1 tablet by mouth Every Night.  -     cyanocobalamin 1000 MCG/ML injection; Inject 1 ml (1,000 mcg) intramuscularly (into the muscle) every 14 days.  -     enalapril (VASOTEC) 10 MG tablet; Take 1 tablet by mouth Daily.  -     hydrochlorothiazide (HYDRODIURIL) 25 MG tablet; Take 1 tablet by mouth Daily.  -     hydrOXYzine (VISTARIL) 50 MG capsule; Take 1 capsule by mouth 4 (Four) Times a Day As Needed for Itching or Anxiety.  -     esomeprazole (NEXIUM) 40 MG capsule; Take 1 capsule by mouth Every Morning Before Breakfast.       Gave Xanax to use only when needed for panic attacks.  He's been using it very rarely, as directed.    Continue with current meds as above for hypertension and hyperlipidemia    Continue with testosterone replacement therapy twice monthly    Continue current dose of Synthroid.    Will start back on a PPI, Nexium, and he will see me in 4 weeks.  If not improving significantly refer back to GI to consider another EGD.    We'll get an alpha gal panel due to the rash with itching history of tick bites.    Return fasting for labs before next visit.    The patient has read and signed the [facility] Controlled Substance Contract.  I will continue to see patient for regular follow up appointments. Patient is well controlled on the medication.  MARY has been reviewed by me and is updated every 3 months. The patient is aware of the potential for addiction and dependence.     .      This document has been electronically signed by Aura Carrillo MD on July 25, 2018 10:25 AM

## 2018-07-30 LAB
ALPHA GAL IGE: 7.8 KU/L
BEEF IGE QN: 3.73 KU/L
LAMB IGE QN: 1.55 KU/L
Lab: 2
Lab: 2
Lab: 3
PORK IGE: 1.11 KU/L

## 2018-08-08 DIAGNOSIS — E88.09 ALPHA GALACTOSIDASE DEFICIENCY: Primary | ICD-10-CM

## 2018-08-08 RX ORDER — EPINEPHRINE 0.3 MG/.3ML
INJECTION SUBCUTANEOUS
Qty: 1 EACH | Refills: 2 | Status: SHIPPED | OUTPATIENT
Start: 2018-08-08 | End: 2019-01-26

## 2018-08-22 ENCOUNTER — OFFICE VISIT (OUTPATIENT)
Dept: FAMILY MEDICINE CLINIC | Facility: CLINIC | Age: 43
End: 2018-08-22

## 2018-08-22 VITALS
BODY MASS INDEX: 39.68 KG/M2 | DIASTOLIC BLOOD PRESSURE: 82 MMHG | HEIGHT: 72 IN | SYSTOLIC BLOOD PRESSURE: 138 MMHG | WEIGHT: 293 LBS

## 2018-08-22 DIAGNOSIS — R10.13 EPIGASTRIC PAIN: ICD-10-CM

## 2018-08-22 DIAGNOSIS — Z91.018 FOOD ALLERGY: Primary | ICD-10-CM

## 2018-08-22 PROCEDURE — 99213 OFFICE O/P EST LOW 20 MIN: CPT | Performed by: FAMILY MEDICINE

## 2018-08-22 NOTE — PROGRESS NOTES
Subjective   Kuldip Castaneda is a 43 y.o. male who presents to the office with continued abdominal discomfort.  He's had an extensive workup over the past year including a colonoscopy and CT of the abdomen.  No specific source has been found but he continues to have discomfort especially in the epigastrium and left upper quadrant.  Sometimes it's worse after meals.  He's been on medications in the past for reflux.  He was recently diagnosed with alpha gal syndrome as well has been some time today discussing this and diet changes necessary.    Abdominal Pain   Pertinent negatives include no constipation, diarrhea, dysuria or frequency.    The following portions of the patient's history were reviewed and updated as appropriate: allergies, current medications, past family history, past medical history, past social history, past surgical history and problem list.    Review of Systems   HENT: Negative for sneezing and trouble swallowing.    Eyes: Negative for visual disturbance.   Respiratory: Negative for chest tightness, shortness of breath and wheezing.    Cardiovascular: Negative for palpitations and leg swelling.   Gastrointestinal: Positive for abdominal distention and abdominal pain. Negative for constipation and diarrhea.   Genitourinary: Negative for dysuria, frequency and urgency.   Musculoskeletal: Negative for back pain.   Neurological: Negative for dizziness.   Psychiatric/Behavioral: Negative for behavioral problems. The patient is not nervous/anxious.             All other systems reviewed and are negative.      Objective   Physical Exam   Constitutional: He appears well-developed and well-nourished. He is cooperative. He does not have a sickly appearance. He does not appear ill.   HENT:   Head: Normocephalic and atraumatic.   Right Ear: External ear normal.   Left Ear: External ear normal.   Nose: Nose normal.   Mouth/Throat: Oropharynx is clear and moist.   Eyes: Pupils are equal, round, and reactive  to light. Conjunctivae and EOM are normal. Right eye exhibits no discharge. Left eye exhibits no discharge. No scleral icterus.   Neck: Trachea normal, normal range of motion and phonation normal. Neck supple. No thyromegaly present.       Cardiovascular: Normal rate, regular rhythm, normal heart sounds and intact distal pulses.  Exam reveals no gallop and no friction rub.    No murmur heard.  Pulmonary/Chest: Effort normal and breath sounds normal. No respiratory distress. He has no wheezes. He has no rales.   Abdominal: Soft. Normal appearance and bowel sounds are normal. He exhibits no distension. There is no tenderness. There is no rebound and no guarding.   Mild tenderness to deep palpation left upper and lower quadrant but no masses rebound or guarding    Musculoskeletal: Normal range of motion. He exhibits no edema.        Lumbar back: Pain: chronic, rates pain a 5.     Vascular Status -  His right foot exhibits abnormal foot vasculature . His right foot exhibits no edema. His left foot exhibits abnormal foot vasculature . His left foot exhibits no edema.  Lymphadenopathy:     He has no cervical adenopathy.   Neurological: No cranial nerve deficit.   Skin: Skin is warm and dry. No rash noted.   Psychiatric: He has a normal mood and affect. His behavior is normal. Judgment and thought content normal.   Nursing note and vitals reviewed.      Assessment/Plan   Kuldip was seen today for labs only and abdominal pain.    Diagnoses and all orders for this visit:    Food allergy    Epigastric pain     made referral to allergy/immunology for further workup and the alpha gal syndrome and to see if he may be having some type of a GI manifestation related to this or another type of a food allergy triggering some of his GI symptoms as there has been no anatomic source found on testing so far.    Reviewed diet for the alpha gal syndrome.      .      This document has been electronically signed by Aura Carrillo MD on  August 22, 2018 9:37 AM

## 2018-08-27 ENCOUNTER — HOSPITAL ENCOUNTER (INPATIENT)
Facility: HOSPITAL | Age: 43
LOS: 3 days | Discharge: HOME | End: 2018-08-30
Attending: EMERGENCY MEDICINE | Admitting: FAMILY MEDICINE

## 2018-08-27 DIAGNOSIS — E11.10 DIABETIC KETOACIDOSIS WITHOUT COMA ASSOCIATED WITH TYPE 2 DIABETES MELLITUS (HCC): Primary | ICD-10-CM

## 2018-08-27 LAB
ALBUMIN SERPL-MCNC: 5.1 G/DL (ref 3.4–4.8)
ALBUMIN/GLOB SERPL: 1.1 G/DL (ref 1.1–1.8)
ALP SERPL-CCNC: 76 U/L (ref 38–126)
ALT SERPL W P-5'-P-CCNC: 36 U/L (ref 21–72)
AMPHET+METHAMPHET UR QL: NEGATIVE
ANION GAP SERPL CALCULATED.3IONS-SCNC: 26 MMOL/L (ref 5–15)
APAP SERPL-MCNC: <10 MCG/ML (ref 10–30)
APTT PPP: 25.8 SECONDS (ref 20–40.3)
AST SERPL-CCNC: 31 U/L (ref 17–59)
ATMOSPHERIC PRESS: ABNORMAL MMHG
BACTERIA UR QL AUTO: ABNORMAL /HPF
BARBITURATES UR QL SCN: NEGATIVE
BASE EXCESS BLDV CALC-SCNC: -10.4 MMOL/L (ref 0–2)
BASOPHILS # BLD AUTO: 0.03 10*3/MM3 (ref 0–0.2)
BASOPHILS NFR BLD AUTO: 0.7 % (ref 0–2)
BDY SITE: ABNORMAL
BENZODIAZ UR QL SCN: POSITIVE
BILIRUB SERPL-MCNC: 0.7 MG/DL (ref 0.2–1.3)
BILIRUB UR QL STRIP: ABNORMAL
BUN BLD-MCNC: 27 MG/DL (ref 7–21)
BUN/CREAT SERPL: 16.1 (ref 7–25)
CALCIUM SPEC-SCNC: 10.1 MG/DL (ref 8.4–10.2)
CANNABINOIDS SERPL QL: POSITIVE
CHLORIDE SERPL-SCNC: 90 MMOL/L (ref 95–110)
CLARITY UR: CLEAR
CO2 SERPL-SCNC: 11 MMOL/L (ref 22–31)
COCAINE UR QL: NEGATIVE
COLOR UR: YELLOW
CREAT BLD-MCNC: 1.68 MG/DL (ref 0.7–1.3)
DEPRECATED RDW RBC AUTO: 40.9 FL (ref 35.1–43.9)
EOSINOPHIL # BLD AUTO: 0.14 10*3/MM3 (ref 0–0.7)
EOSINOPHIL NFR BLD AUTO: 3.3 % (ref 0–7)
ERYTHROCYTE [DISTWIDTH] IN BLOOD BY AUTOMATED COUNT: 13.8 % (ref 11.5–14.5)
ETHANOL BLD-MCNC: <10 MG/DL (ref 0–10)
ETHANOL UR QL: <0.01 %
GFR SERPL CREATININE-BSD FRML MDRD: 54 ML/MIN/1.73 (ref 63–147)
GLOBULIN UR ELPH-MCNC: 4.5 GM/DL (ref 2.3–3.5)
GLUCOSE BLD-MCNC: 537 MG/DL (ref 60–100)
GLUCOSE UR STRIP-MCNC: ABNORMAL MG/DL
HCO3 BLDV-SCNC: 15.7 MMOL/L (ref 18–23)
HCT VFR BLD AUTO: 43 % (ref 39–49)
HGB BLD-MCNC: 16.3 G/DL (ref 13.7–17.3)
HGB UR QL STRIP.AUTO: ABNORMAL
HOLD SPECIMEN: NORMAL
HYALINE CASTS UR QL AUTO: ABNORMAL /LPF
IMM GRANULOCYTES # BLD: 0.01 10*3/MM3 (ref 0–0.02)
IMM GRANULOCYTES NFR BLD: 0.2 % (ref 0–0.5)
INR PPP: 1.01 (ref 0.8–1.2)
KETONES UR QL STRIP: ABNORMAL
LEUKOCYTE ESTERASE UR QL STRIP.AUTO: NEGATIVE
LYMPHOCYTES # BLD AUTO: 2.25 10*3/MM3 (ref 0.6–4.2)
LYMPHOCYTES NFR BLD AUTO: 53.3 % (ref 10–50)
MAGNESIUM SERPL-MCNC: 1.9 MG/DL (ref 1.6–2.3)
MCH RBC QN AUTO: 30.9 PG (ref 26.5–34)
MCHC RBC AUTO-ENTMCNC: 37.9 G/DL (ref 31.5–36.3)
MCV RBC AUTO: 81.4 FL (ref 80–98)
METHADONE UR QL SCN: NEGATIVE
MODALITY: ABNORMAL
MONOCYTES # BLD AUTO: 0.43 10*3/MM3 (ref 0–0.9)
MONOCYTES NFR BLD AUTO: 10.2 % (ref 0–12)
NEUTROPHILS # BLD AUTO: 1.36 10*3/MM3 (ref 2–8.6)
NEUTROPHILS NFR BLD AUTO: 32.3 % (ref 37–80)
NITRITE UR QL STRIP: NEGATIVE
OPIATES UR QL: NEGATIVE
OXYCODONE UR QL SCN: NEGATIVE
PCO2 BLDV: 35.1 MM HG (ref 41–51)
PH BLDV: 7.26 PH UNITS (ref 7.29–7.37)
PH UR STRIP.AUTO: 5.5 [PH] (ref 5–9)
PHOSPHATE SERPL-MCNC: 5.3 MG/DL (ref 2.4–4.4)
PLATELET # BLD AUTO: 254 10*3/MM3 (ref 150–450)
PMV BLD AUTO: 10.1 FL (ref 8–12)
PO2 BLDV: 73.2 MM HG (ref 27–53)
POTASSIUM BLD-SCNC: 5 MMOL/L (ref 3.5–5.1)
PROT SERPL-MCNC: 9.6 G/DL (ref 6.3–8.6)
PROT UR QL STRIP: ABNORMAL
PROTHROMBIN TIME: 13.1 SECONDS (ref 11.1–15.3)
RBC # BLD AUTO: 5.28 10*6/MM3 (ref 4.37–5.74)
RBC # UR: ABNORMAL /HPF
REF LAB TEST METHOD: ABNORMAL
SALICYLATES SERPL-MCNC: 1.7 MG/DL (ref 10–20)
SAO2 % BLDCOV: 93.1 % (ref 45–75)
SODIUM BLD-SCNC: 127 MMOL/L (ref 137–145)
SP GR UR STRIP: 1.02 (ref 1–1.03)
SQUAMOUS #/AREA URNS HPF: ABNORMAL /HPF
UROBILINOGEN UR QL STRIP: ABNORMAL
WBC NRBC COR # BLD: 4.22 10*3/MM3 (ref 3.2–9.8)
WBC UR QL AUTO: ABNORMAL /HPF
WHOLE BLOOD HOLD SPECIMEN: NORMAL
WHOLE BLOOD HOLD SPECIMEN: NORMAL

## 2018-08-27 PROCEDURE — 80307 DRUG TEST PRSMV CHEM ANLYZR: CPT | Performed by: EMERGENCY MEDICINE

## 2018-08-27 PROCEDURE — 80053 COMPREHEN METABOLIC PANEL: CPT | Performed by: EMERGENCY MEDICINE

## 2018-08-27 PROCEDURE — 99285 EMERGENCY DEPT VISIT HI MDM: CPT

## 2018-08-27 PROCEDURE — 82803 BLOOD GASES ANY COMBINATION: CPT | Performed by: EMERGENCY MEDICINE

## 2018-08-27 PROCEDURE — 83735 ASSAY OF MAGNESIUM: CPT | Performed by: EMERGENCY MEDICINE

## 2018-08-27 PROCEDURE — 85610 PROTHROMBIN TIME: CPT | Performed by: EMERGENCY MEDICINE

## 2018-08-27 PROCEDURE — 82962 GLUCOSE BLOOD TEST: CPT

## 2018-08-27 PROCEDURE — 84100 ASSAY OF PHOSPHORUS: CPT | Performed by: EMERGENCY MEDICINE

## 2018-08-27 PROCEDURE — 85025 COMPLETE CBC W/AUTO DIFF WBC: CPT | Performed by: EMERGENCY MEDICINE

## 2018-08-27 PROCEDURE — 63710000001 INSULIN REGULAR HUMAN PER 5 UNITS: Performed by: EMERGENCY MEDICINE

## 2018-08-27 PROCEDURE — 81001 URINALYSIS AUTO W/SCOPE: CPT | Performed by: EMERGENCY MEDICINE

## 2018-08-27 PROCEDURE — 83036 HEMOGLOBIN GLYCOSYLATED A1C: CPT | Performed by: FAMILY MEDICINE

## 2018-08-27 PROCEDURE — 85730 THROMBOPLASTIN TIME PARTIAL: CPT | Performed by: EMERGENCY MEDICINE

## 2018-08-27 RX ORDER — POTASSIUM CHLORIDE 750 MG/1
20 CAPSULE, EXTENDED RELEASE ORAL AS NEEDED
Status: DISCONTINUED | OUTPATIENT
Start: 2018-08-27 | End: 2018-08-30 | Stop reason: HOSPADM

## 2018-08-27 RX ORDER — SODIUM CHLORIDE 0.9 % (FLUSH) 0.9 %
1-10 SYRINGE (ML) INJECTION AS NEEDED
Status: DISCONTINUED | OUTPATIENT
Start: 2018-08-27 | End: 2018-08-30 | Stop reason: HOSPADM

## 2018-08-27 RX ORDER — DEXTROSE AND SODIUM CHLORIDE 5; .45 G/100ML; G/100ML
150 INJECTION, SOLUTION INTRAVENOUS CONTINUOUS PRN
Status: DISCONTINUED | OUTPATIENT
Start: 2018-08-27 | End: 2018-08-29 | Stop reason: SDUPTHER

## 2018-08-27 RX ORDER — POTASSIUM CHLORIDE 750 MG/1
10 CAPSULE, EXTENDED RELEASE ORAL AS NEEDED
Status: DISCONTINUED | OUTPATIENT
Start: 2018-08-27 | End: 2018-08-30 | Stop reason: HOSPADM

## 2018-08-27 RX ORDER — POTASSIUM CHLORIDE 7.46 G/1000ML
10 INJECTION, SOLUTION INTRAVENOUS AS NEEDED
Status: DISCONTINUED | OUTPATIENT
Start: 2018-08-27 | End: 2018-08-30 | Stop reason: HOSPADM

## 2018-08-27 RX ORDER — SODIUM CHLORIDE 0.9 % (FLUSH) 0.9 %
10 SYRINGE (ML) INJECTION AS NEEDED
Status: DISCONTINUED | OUTPATIENT
Start: 2018-08-27 | End: 2018-08-30 | Stop reason: HOSPADM

## 2018-08-27 RX ORDER — DEXTROSE MONOHYDRATE 25 G/50ML
12.5 INJECTION, SOLUTION INTRAVENOUS
Status: DISCONTINUED | OUTPATIENT
Start: 2018-08-27 | End: 2018-08-30 | Stop reason: HOSPADM

## 2018-08-27 RX ORDER — MAGNESIUM SULFATE HEPTAHYDRATE 40 MG/ML
4 INJECTION, SOLUTION INTRAVENOUS AS NEEDED
Status: DISCONTINUED | OUTPATIENT
Start: 2018-08-27 | End: 2018-08-30 | Stop reason: HOSPADM

## 2018-08-27 RX ORDER — MAGNESIUM SULFATE HEPTAHYDRATE 40 MG/ML
2 INJECTION, SOLUTION INTRAVENOUS AS NEEDED
Status: DISCONTINUED | OUTPATIENT
Start: 2018-08-27 | End: 2018-08-30 | Stop reason: HOSPADM

## 2018-08-27 RX ORDER — POTASSIUM CHLORIDE 1.5 G/1.77G
10 POWDER, FOR SOLUTION ORAL AS NEEDED
Status: DISCONTINUED | OUTPATIENT
Start: 2018-08-27 | End: 2018-08-30 | Stop reason: HOSPADM

## 2018-08-27 RX ORDER — SODIUM CHLORIDE AND POTASSIUM CHLORIDE 150; 450 MG/100ML; MG/100ML
150 INJECTION, SOLUTION INTRAVENOUS CONTINUOUS PRN
Status: DISCONTINUED | OUTPATIENT
Start: 2018-08-27 | End: 2018-08-29

## 2018-08-27 RX ORDER — HEPARIN SODIUM 5000 [USP'U]/ML
5000 INJECTION, SOLUTION INTRAVENOUS; SUBCUTANEOUS EVERY 8 HOURS SCHEDULED
Status: DISCONTINUED | OUTPATIENT
Start: 2018-08-28 | End: 2018-08-28

## 2018-08-27 RX ORDER — POTASSIUM CHLORIDE 1.5 G/1.77G
40 POWDER, FOR SOLUTION ORAL AS NEEDED
Status: DISCONTINUED | OUTPATIENT
Start: 2018-08-27 | End: 2018-08-30 | Stop reason: HOSPADM

## 2018-08-27 RX ORDER — POTASSIUM CHLORIDE 750 MG/1
40 CAPSULE, EXTENDED RELEASE ORAL AS NEEDED
Status: DISCONTINUED | OUTPATIENT
Start: 2018-08-27 | End: 2018-08-30 | Stop reason: HOSPADM

## 2018-08-27 RX ORDER — DEXTROSE, SODIUM CHLORIDE, AND POTASSIUM CHLORIDE 5; .45; .15 G/100ML; G/100ML; G/100ML
150 INJECTION INTRAVENOUS CONTINUOUS PRN
Status: DISCONTINUED | OUTPATIENT
Start: 2018-08-27 | End: 2018-08-29

## 2018-08-27 RX ORDER — POTASSIUM CHLORIDE 1.5 G/1.77G
20 POWDER, FOR SOLUTION ORAL AS NEEDED
Status: DISCONTINUED | OUTPATIENT
Start: 2018-08-27 | End: 2018-08-30 | Stop reason: HOSPADM

## 2018-08-27 RX ADMIN — SODIUM CHLORIDE 1000 ML: 900 INJECTION, SOLUTION INTRAVENOUS at 22:16

## 2018-08-27 RX ADMIN — SODIUM CHLORIDE 1000 ML: 9 INJECTION, SOLUTION INTRAVENOUS at 21:56

## 2018-08-27 RX ADMIN — HUMAN INSULIN 10 UNITS: 100 INJECTION, SOLUTION SUBCUTANEOUS at 22:15

## 2018-08-28 PROBLEM — E10.10 DIABETIC KETOACIDOSIS WITHOUT COMA ASSOCIATED WITH TYPE 1 DIABETES MELLITUS: Status: ACTIVE | Noted: 2018-08-27

## 2018-08-28 LAB
ACETONE BLD QL: ABNORMAL
ANION GAP SERPL CALCULATED.3IONS-SCNC: 13 MMOL/L (ref 5–15)
ANION GAP SERPL CALCULATED.3IONS-SCNC: 15 MMOL/L (ref 5–15)
ANION GAP SERPL CALCULATED.3IONS-SCNC: 20 MMOL/L (ref 5–15)
ANION GAP SERPL CALCULATED.3IONS-SCNC: 22 MMOL/L (ref 5–15)
ATMOSPHERIC PRESS: ABNORMAL MMHG
BASE EXCESS BLDV CALC-SCNC: -10.7 MMOL/L (ref 0–2)
BASE EXCESS BLDV CALC-SCNC: -3.7 MMOL/L (ref 0–2)
BASE EXCESS BLDV CALC-SCNC: -5.1 MMOL/L (ref 0–2)
BASE EXCESS BLDV CALC-SCNC: -7.6 MMOL/L (ref 0–2)
BDY SITE: ABNORMAL
BUN BLD-MCNC: 20 MG/DL (ref 7–21)
BUN BLD-MCNC: 21 MG/DL (ref 7–21)
BUN BLD-MCNC: 23 MG/DL (ref 7–21)
BUN BLD-MCNC: 25 MG/DL (ref 7–21)
BUN/CREAT SERPL: 16.9 (ref 7–25)
BUN/CREAT SERPL: 17.2 (ref 7–25)
BUN/CREAT SERPL: 18 (ref 7–25)
BUN/CREAT SERPL: 18.7 (ref 7–25)
CA-I BLD-MCNC: 4.65 MG/DL (ref 4.6–5.6)
CA-I BLD-MCNC: 4.66 MG/DL (ref 4.6–5.6)
CA-I BLD-MCNC: 4.75 MG/DL (ref 4.6–5.6)
CA-I BLD-MCNC: 4.82 MG/DL (ref 4.6–5.6)
CALCIUM SPEC-SCNC: 9 MG/DL (ref 8.4–10.2)
CALCIUM SPEC-SCNC: 9 MG/DL (ref 8.4–10.2)
CALCIUM SPEC-SCNC: 9.3 MG/DL (ref 8.4–10.2)
CALCIUM SPEC-SCNC: 9.3 MG/DL (ref 8.4–10.2)
CHLORIDE SERPL-SCNC: 100 MMOL/L (ref 95–110)
CHLORIDE SERPL-SCNC: 95 MMOL/L (ref 95–110)
CHLORIDE SERPL-SCNC: 99 MMOL/L (ref 95–110)
CHLORIDE SERPL-SCNC: 99 MMOL/L (ref 95–110)
CO2 SERPL-SCNC: 12 MMOL/L (ref 22–31)
CO2 SERPL-SCNC: 14 MMOL/L (ref 22–31)
CO2 SERPL-SCNC: 19 MMOL/L (ref 22–31)
CO2 SERPL-SCNC: 20 MMOL/L (ref 22–31)
CREAT BLD-MCNC: 1.18 MG/DL (ref 0.7–1.3)
CREAT BLD-MCNC: 1.22 MG/DL (ref 0.7–1.3)
CREAT BLD-MCNC: 1.23 MG/DL (ref 0.7–1.3)
CREAT BLD-MCNC: 1.39 MG/DL (ref 0.7–1.3)
D-LACTATE SERPL-SCNC: 1.4 MMOL/L (ref 0.5–2)
GFR SERPL CREATININE-BSD FRML MDRD: 68 ML/MIN/1.73 (ref 63–147)
GFR SERPL CREATININE-BSD FRML MDRD: 78 ML/MIN/1.73 (ref 63–147)
GFR SERPL CREATININE-BSD FRML MDRD: 79 ML/MIN/1.73 (ref 63–147)
GFR SERPL CREATININE-BSD FRML MDRD: 82 ML/MIN/1.73 (ref 63–147)
GLUCOSE BLD-MCNC: 219 MG/DL (ref 60–100)
GLUCOSE BLD-MCNC: 227 MG/DL (ref 60–100)
GLUCOSE BLD-MCNC: 267 MG/DL (ref 60–100)
GLUCOSE BLD-MCNC: 428 MG/DL (ref 60–100)
GLUCOSE BLDC GLUCOMTR-MCNC: 243 MG/DL (ref 70–130)
GLUCOSE BLDC GLUCOMTR-MCNC: 246 MG/DL (ref 70–130)
GLUCOSE BLDC GLUCOMTR-MCNC: 259 MG/DL (ref 70–130)
GLUCOSE BLDC GLUCOMTR-MCNC: 282 MG/DL (ref 70–130)
GLUCOSE BLDC GLUCOMTR-MCNC: 283 MG/DL (ref 70–130)
GLUCOSE BLDC GLUCOMTR-MCNC: 344 MG/DL (ref 70–130)
GLUCOSE BLDC GLUCOMTR-MCNC: 368 MG/DL (ref 70–130)
GLUCOSE BLDC GLUCOMTR-MCNC: 387 MG/DL (ref 70–130)
GLUCOSE BLDC GLUCOMTR-MCNC: 399 MG/DL (ref 70–130)
HBA1C MFR BLD: 13.8 % (ref 4–5.6)
HCO3 BLDV-SCNC: 14.8 MMOL/L (ref 18–23)
HCO3 BLDV-SCNC: 17.6 MMOL/L (ref 18–23)
HCO3 BLDV-SCNC: 21.3 MMOL/L (ref 18–23)
HCO3 BLDV-SCNC: 23.3 MMOL/L (ref 18–23)
HGB BLDA-MCNC: 15.6 G/DL (ref 14–18)
MAGNESIUM SERPL-MCNC: 1.9 MG/DL (ref 1.6–2.3)
MAGNESIUM SERPL-MCNC: 1.9 MG/DL (ref 1.6–2.3)
MAGNESIUM SERPL-MCNC: 2 MG/DL (ref 1.6–2.3)
MAGNESIUM SERPL-MCNC: 2.1 MG/DL (ref 1.6–2.3)
MODALITY: ABNORMAL
PCO2 BLDV: 31.9 MM HG (ref 41–51)
PCO2 BLDV: 34.5 MM HG (ref 41–51)
PCO2 BLDV: 43.4 MM HG (ref 41–51)
PCO2 BLDV: 48.2 MM HG (ref 41–51)
PH BLDV: 7.28 PH UNITS (ref 7.29–7.37)
PH BLDV: 7.29 PH UNITS (ref 7.29–7.37)
PH BLDV: 7.3 PH UNITS (ref 7.29–7.37)
PH BLDV: 7.32 PH UNITS (ref 7.29–7.37)
PHOSPHATE SERPL-MCNC: 3 MG/DL (ref 2.4–4.4)
PHOSPHATE SERPL-MCNC: 3.2 MG/DL (ref 2.4–4.4)
PHOSPHATE SERPL-MCNC: 3.5 MG/DL (ref 2.4–4.4)
PHOSPHATE SERPL-MCNC: 4 MG/DL (ref 2.4–4.4)
PO2 BLDV: 28.2 MM HG (ref 27–53)
PO2 BLDV: 46.2 MM HG (ref 27–53)
PO2 BLDV: 69.6 MM HG (ref 27–53)
PO2 BLDV: 99.8 MM HG (ref 27–53)
POTASSIUM BLD-SCNC: 4.3 MMOL/L (ref 3.5–5.1)
POTASSIUM BLD-SCNC: 4.4 MMOL/L (ref 3.5–5.1)
POTASSIUM BLD-SCNC: 4.5 MMOL/L (ref 3.5–5.1)
POTASSIUM BLD-SCNC: 4.8 MMOL/L (ref 3.5–5.1)
SAO2 % BLDCOV: 48.7 % (ref 45–75)
SAO2 % BLDCOV: 79.7 % (ref 45–75)
SAO2 % BLDCOV: 93.1 % (ref 45–75)
SAO2 % BLDCOV: 97.8 % (ref 45–75)
SODIUM BLD-SCNC: 129 MMOL/L (ref 137–145)
SODIUM BLD-SCNC: 132 MMOL/L (ref 137–145)
SODIUM BLD-SCNC: 133 MMOL/L (ref 137–145)
SODIUM BLD-SCNC: 134 MMOL/L (ref 137–145)
WHOLE BLOOD HOLD SPECIMEN: NORMAL

## 2018-08-28 PROCEDURE — 82330 ASSAY OF CALCIUM: CPT | Performed by: FAMILY MEDICINE

## 2018-08-28 PROCEDURE — 25810000003 POTASSIUM CHLORIDE PER 2 MEQ: Performed by: INTERNAL MEDICINE

## 2018-08-28 PROCEDURE — 83735 ASSAY OF MAGNESIUM: CPT | Performed by: FAMILY MEDICINE

## 2018-08-28 PROCEDURE — 63710000001 INSULIN ASPART PER 5 UNITS: Performed by: INTERNAL MEDICINE

## 2018-08-28 PROCEDURE — 82803 BLOOD GASES ANY COMBINATION: CPT | Performed by: FAMILY MEDICINE

## 2018-08-28 PROCEDURE — 83605 ASSAY OF LACTIC ACID: CPT | Performed by: EMERGENCY MEDICINE

## 2018-08-28 PROCEDURE — 84100 ASSAY OF PHOSPHORUS: CPT | Performed by: FAMILY MEDICINE

## 2018-08-28 PROCEDURE — 82962 GLUCOSE BLOOD TEST: CPT

## 2018-08-28 PROCEDURE — 80048 BASIC METABOLIC PNL TOTAL CA: CPT | Performed by: FAMILY MEDICINE

## 2018-08-28 PROCEDURE — 63710000001 INSULIN REGULAR HUMAN PER 5 UNITS: Performed by: FAMILY MEDICINE

## 2018-08-28 PROCEDURE — 25810000003 POTASSIUM CHLORIDE PER 2 MEQ: Performed by: FAMILY MEDICINE

## 2018-08-28 PROCEDURE — 82009 KETONE BODYS QUAL: CPT | Performed by: EMERGENCY MEDICINE

## 2018-08-28 PROCEDURE — 36415 COLL VENOUS BLD VENIPUNCTURE: CPT | Performed by: FAMILY MEDICINE

## 2018-08-28 PROCEDURE — 25010000002 HEPARIN (PORCINE) PER 1000 UNITS: Performed by: FAMILY MEDICINE

## 2018-08-28 PROCEDURE — 99254 IP/OBS CNSLTJ NEW/EST MOD 60: CPT | Performed by: INTERNAL MEDICINE

## 2018-08-28 PROCEDURE — 94799 UNLISTED PULMONARY SVC/PX: CPT

## 2018-08-28 PROCEDURE — 63710000001 INSULIN DETEMIR PER 5 UNITS: Performed by: INTERNAL MEDICINE

## 2018-08-28 RX ORDER — LABETALOL HYDROCHLORIDE 5 MG/ML
10 INJECTION, SOLUTION INTRAVENOUS EVERY 6 HOURS PRN
Status: DISCONTINUED | OUTPATIENT
Start: 2018-08-28 | End: 2018-08-30 | Stop reason: HOSPADM

## 2018-08-28 RX ORDER — HYDROXYZINE PAMOATE 50 MG/1
50 CAPSULE ORAL 4 TIMES DAILY PRN
Status: DISCONTINUED | OUTPATIENT
Start: 2018-08-28 | End: 2018-08-30 | Stop reason: HOSPADM

## 2018-08-28 RX ORDER — ALPRAZOLAM 1 MG/1
2 TABLET ORAL NIGHTLY PRN
Status: DISCONTINUED | OUTPATIENT
Start: 2018-08-28 | End: 2018-08-30 | Stop reason: HOSPADM

## 2018-08-28 RX ORDER — LEVOTHYROXINE SODIUM 0.05 MG/1
50 TABLET ORAL EVERY MORNING
Status: DISCONTINUED | OUTPATIENT
Start: 2018-08-28 | End: 2018-08-30 | Stop reason: HOSPADM

## 2018-08-28 RX ADMIN — INSULIN DETEMIR 25 UNITS: 100 INJECTION, SOLUTION SUBCUTANEOUS at 23:14

## 2018-08-28 RX ADMIN — POTASSIUM CHLORIDE AND SODIUM CHLORIDE 150 ML/HR: 450; 150 INJECTION, SOLUTION INTRAVENOUS at 10:01

## 2018-08-28 RX ADMIN — POTASSIUM CHLORIDE AND SODIUM CHLORIDE 150 ML/HR: 450; 150 INJECTION, SOLUTION INTRAVENOUS at 17:11

## 2018-08-28 RX ADMIN — SODIUM CHLORIDE 13 UNITS/HR: 9 INJECTION, SOLUTION INTRAVENOUS at 01:08

## 2018-08-28 RX ADMIN — ALPRAZOLAM 2 MG: 1 TABLET ORAL at 22:23

## 2018-08-28 RX ADMIN — INSULIN DETEMIR 25 UNITS: 100 INJECTION, SOLUTION SUBCUTANEOUS at 17:51

## 2018-08-28 RX ADMIN — INSULIN ASPART 6 UNITS: 100 INJECTION, SOLUTION INTRAVENOUS; SUBCUTANEOUS at 17:50

## 2018-08-28 RX ADMIN — ALPRAZOLAM 2 MG: 1 TABLET ORAL at 02:42

## 2018-08-28 RX ADMIN — SODIUM CHLORIDE 6 UNITS/HR: 9 INJECTION, SOLUTION INTRAVENOUS at 03:01

## 2018-08-28 RX ADMIN — POTASSIUM CHLORIDE, DEXTROSE MONOHYDRATE AND SODIUM CHLORIDE 150 ML/HR: 150; 5; 450 INJECTION, SOLUTION INTRAVENOUS at 00:16

## 2018-08-28 RX ADMIN — HEPARIN SODIUM 5000 UNITS: 5000 INJECTION, SOLUTION INTRAVENOUS; SUBCUTANEOUS at 00:17

## 2018-08-28 RX ADMIN — POTASSIUM CHLORIDE AND SODIUM CHLORIDE 250 ML/HR: 450; 150 INJECTION, SOLUTION INTRAVENOUS at 04:54

## 2018-08-28 RX ADMIN — POTASSIUM CHLORIDE AND SODIUM CHLORIDE 250 ML/HR: 450; 150 INJECTION, SOLUTION INTRAVENOUS at 00:51

## 2018-08-28 RX ADMIN — LEVOTHYROXINE SODIUM 50 MCG: 50 TABLET ORAL at 08:44

## 2018-08-28 RX ADMIN — INSULIN ASPART 8 UNITS: 100 INJECTION, SOLUTION INTRAVENOUS; SUBCUTANEOUS at 20:54

## 2018-08-28 RX ADMIN — INSULIN ASPART 6 UNITS: 100 INJECTION, SOLUTION INTRAVENOUS; SUBCUTANEOUS at 17:49

## 2018-08-28 RX ADMIN — INSULIN ASPART 8 UNITS: 100 INJECTION, SOLUTION INTRAVENOUS; SUBCUTANEOUS at 23:14

## 2018-08-29 LAB
ALBUMIN SERPL-MCNC: 4.4 G/DL (ref 3.4–4.8)
ALBUMIN/GLOB SERPL: 1.2 G/DL (ref 1.1–1.8)
ALP SERPL-CCNC: 60 U/L (ref 38–126)
ALT SERPL W P-5'-P-CCNC: 45 U/L (ref 21–72)
ANION GAP SERPL CALCULATED.3IONS-SCNC: 12 MMOL/L (ref 5–15)
ANION GAP SERPL CALCULATED.3IONS-SCNC: 14 MMOL/L (ref 5–15)
AST SERPL-CCNC: 39 U/L (ref 17–59)
BILIRUB SERPL-MCNC: 0.6 MG/DL (ref 0.2–1.3)
BUN BLD-MCNC: 18 MG/DL (ref 7–21)
BUN BLD-MCNC: 21 MG/DL (ref 7–21)
BUN/CREAT SERPL: 14.3 (ref 7–25)
BUN/CREAT SERPL: 16 (ref 7–25)
CALCIUM SPEC-SCNC: 9.2 MG/DL (ref 8.4–10.2)
CALCIUM SPEC-SCNC: 9.5 MG/DL (ref 8.4–10.2)
CHLORIDE SERPL-SCNC: 100 MMOL/L (ref 95–110)
CHLORIDE SERPL-SCNC: 101 MMOL/L (ref 95–110)
CO2 SERPL-SCNC: 20 MMOL/L (ref 22–31)
CO2 SERPL-SCNC: 20 MMOL/L (ref 22–31)
CREAT BLD-MCNC: 1.26 MG/DL (ref 0.7–1.3)
CREAT BLD-MCNC: 1.31 MG/DL (ref 0.7–1.3)
DEPRECATED RDW RBC AUTO: 42.2 FL (ref 35.1–43.9)
EOSINOPHIL # BLD MANUAL: 0.28 10*3/MM3 (ref 0–0.7)
EOSINOPHIL NFR BLD MANUAL: 9 % (ref 0–7)
ERYTHROCYTE [DISTWIDTH] IN BLOOD BY AUTOMATED COUNT: 13.9 % (ref 11.5–14.5)
GFR SERPL CREATININE-BSD FRML MDRD: 72 ML/MIN/1.73 (ref 63–147)
GFR SERPL CREATININE-BSD FRML MDRD: 76 ML/MIN/1.73 (ref 63–147)
GLOBULIN UR ELPH-MCNC: 3.7 GM/DL (ref 2.3–3.5)
GLUCOSE BLD-MCNC: 300 MG/DL (ref 60–100)
GLUCOSE BLD-MCNC: 312 MG/DL (ref 60–100)
GLUCOSE BLDC GLUCOMTR-MCNC: 231 MG/DL (ref 70–130)
GLUCOSE BLDC GLUCOMTR-MCNC: 231 MG/DL (ref 70–130)
GLUCOSE BLDC GLUCOMTR-MCNC: 242 MG/DL (ref 70–130)
GLUCOSE BLDC GLUCOMTR-MCNC: 251 MG/DL (ref 70–130)
GLUCOSE BLDC GLUCOMTR-MCNC: 269 MG/DL (ref 70–130)
GLUCOSE BLDC GLUCOMTR-MCNC: 274 MG/DL (ref 70–130)
GLUCOSE BLDC GLUCOMTR-MCNC: 279 MG/DL (ref 70–130)
GLUCOSE BLDC GLUCOMTR-MCNC: 288 MG/DL (ref 70–130)
GLUCOSE BLDC GLUCOMTR-MCNC: 331 MG/DL (ref 70–130)
GLUCOSE BLDC GLUCOMTR-MCNC: 341 MG/DL (ref 70–130)
GLUCOSE BLDC GLUCOMTR-MCNC: 360 MG/DL (ref 70–130)
GLUCOSE BLDC GLUCOMTR-MCNC: 404 MG/DL (ref 70–130)
GLUCOSE BLDC GLUCOMTR-MCNC: 407 MG/DL (ref 70–130)
GLUCOSE BLDC GLUCOMTR-MCNC: 469 MG/DL (ref 70–130)
GLUCOSE BLDC GLUCOMTR-MCNC: 520 MG/DL (ref 70–130)
HCT VFR BLD AUTO: 39.5 % (ref 39–49)
HGB BLD-MCNC: 14.4 G/DL (ref 13.7–17.3)
HOLD SPECIMEN: NORMAL
LYMPHOCYTES # BLD MANUAL: 1.51 10*3/MM3 (ref 0.6–4.2)
LYMPHOCYTES NFR BLD MANUAL: 11 % (ref 0–12)
LYMPHOCYTES NFR BLD MANUAL: 49 % (ref 10–50)
MCH RBC QN AUTO: 30.1 PG (ref 26.5–34)
MCHC RBC AUTO-ENTMCNC: 36.5 G/DL (ref 31.5–36.3)
MCV RBC AUTO: 82.5 FL (ref 80–98)
MONOCYTES # BLD AUTO: 0.34 10*3/MM3 (ref 0–0.9)
NEUTROPHILS # BLD AUTO: 0.9 10*3/MM3 (ref 2–8.6)
NEUTROPHILS NFR BLD MANUAL: 29 % (ref 37–80)
PLATELET # BLD AUTO: 218 10*3/MM3 (ref 150–450)
PMV BLD AUTO: 10.1 FL (ref 8–12)
POTASSIUM BLD-SCNC: 4.2 MMOL/L (ref 3.5–5.1)
POTASSIUM BLD-SCNC: 4.3 MMOL/L (ref 3.5–5.1)
PROT SERPL-MCNC: 8.1 G/DL (ref 6.3–8.6)
RBC # BLD AUTO: 4.79 10*6/MM3 (ref 4.37–5.74)
RBC MORPH BLD: NORMAL
SMALL PLATELETS BLD QL SMEAR: ADEQUATE
SODIUM BLD-SCNC: 133 MMOL/L (ref 137–145)
SODIUM BLD-SCNC: 134 MMOL/L (ref 137–145)
TSH SERPL DL<=0.05 MIU/L-ACNC: 3.73 MIU/ML (ref 0.46–4.68)
VARIANT LYMPHS NFR BLD MANUAL: 2 % (ref 0–5)
WBC MORPH BLD: NORMAL
WBC NRBC COR # BLD: 3.09 10*3/MM3 (ref 3.2–9.8)

## 2018-08-29 PROCEDURE — 82962 GLUCOSE BLOOD TEST: CPT

## 2018-08-29 PROCEDURE — 84443 ASSAY THYROID STIM HORMONE: CPT | Performed by: INTERNAL MEDICINE

## 2018-08-29 PROCEDURE — 85007 BL SMEAR W/DIFF WBC COUNT: CPT | Performed by: FAMILY MEDICINE

## 2018-08-29 PROCEDURE — 85025 COMPLETE CBC W/AUTO DIFF WBC: CPT | Performed by: FAMILY MEDICINE

## 2018-08-29 PROCEDURE — 86341 ISLET CELL ANTIBODY: CPT | Performed by: INTERNAL MEDICINE

## 2018-08-29 PROCEDURE — 84681 ASSAY OF C-PEPTIDE: CPT | Performed by: INTERNAL MEDICINE

## 2018-08-29 PROCEDURE — 80048 BASIC METABOLIC PNL TOTAL CA: CPT | Performed by: FAMILY MEDICINE

## 2018-08-29 PROCEDURE — 63710000001 INSULIN DETEMIR PER 5 UNITS: Performed by: INTERNAL MEDICINE

## 2018-08-29 PROCEDURE — 80053 COMPREHEN METABOLIC PANEL: CPT | Performed by: FAMILY MEDICINE

## 2018-08-29 PROCEDURE — 63710000001 INSULIN ASPART PER 5 UNITS: Performed by: INTERNAL MEDICINE

## 2018-08-29 RX ORDER — DEXTROSE, SODIUM CHLORIDE, AND POTASSIUM CHLORIDE 5; .45; .15 G/100ML; G/100ML; G/100ML
75 INJECTION INTRAVENOUS CONTINUOUS PRN
Status: DISCONTINUED | OUTPATIENT
Start: 2018-08-29 | End: 2018-08-30 | Stop reason: HOSPADM

## 2018-08-29 RX ORDER — NICOTINE POLACRILEX 4 MG
15 LOZENGE BUCCAL
Status: DISCONTINUED | OUTPATIENT
Start: 2018-08-29 | End: 2018-08-30 | Stop reason: HOSPADM

## 2018-08-29 RX ORDER — DEXTROSE MONOHYDRATE 25 G/50ML
25 INJECTION, SOLUTION INTRAVENOUS
Status: DISCONTINUED | OUTPATIENT
Start: 2018-08-29 | End: 2018-08-30 | Stop reason: HOSPADM

## 2018-08-29 RX ADMIN — INSULIN ASPART 9 UNITS: 100 INJECTION, SOLUTION INTRAVENOUS; SUBCUTANEOUS at 08:41

## 2018-08-29 RX ADMIN — INSULIN ASPART 18 UNITS: 100 INJECTION, SOLUTION INTRAVENOUS; SUBCUTANEOUS at 11:59

## 2018-08-29 RX ADMIN — INSULIN ASPART 10 UNITS: 100 INJECTION, SOLUTION INTRAVENOUS; SUBCUTANEOUS at 17:42

## 2018-08-29 RX ADMIN — INSULIN DETEMIR 35 UNITS: 100 INJECTION, SOLUTION SUBCUTANEOUS at 13:04

## 2018-08-29 RX ADMIN — INSULIN ASPART 12 UNITS: 100 INJECTION, SOLUTION INTRAVENOUS; SUBCUTANEOUS at 02:51

## 2018-08-29 RX ADMIN — INSULIN ASPART 12 UNITS: 100 INJECTION, SOLUTION INTRAVENOUS; SUBCUTANEOUS at 20:41

## 2018-08-29 RX ADMIN — INSULIN ASPART 20 UNITS: 100 INJECTION, SOLUTION INTRAVENOUS; SUBCUTANEOUS at 12:00

## 2018-08-29 RX ADMIN — INSULIN DETEMIR 50 UNITS: 100 INJECTION, SOLUTION SUBCUTANEOUS at 20:41

## 2018-08-29 RX ADMIN — INSULIN ASPART 6 UNITS: 100 INJECTION, SOLUTION INTRAVENOUS; SUBCUTANEOUS at 08:40

## 2018-08-29 RX ADMIN — LEVOTHYROXINE SODIUM 50 MCG: 50 TABLET ORAL at 06:19

## 2018-08-29 RX ADMIN — INSULIN ASPART 15 UNITS: 100 INJECTION, SOLUTION INTRAVENOUS; SUBCUTANEOUS at 17:40

## 2018-08-29 RX ADMIN — ALPRAZOLAM 2 MG: 1 TABLET ORAL at 20:42

## 2018-08-30 VITALS
BODY MASS INDEX: 40.69 KG/M2 | HEART RATE: 79 BPM | SYSTOLIC BLOOD PRESSURE: 124 MMHG | TEMPERATURE: 96.9 F | OXYGEN SATURATION: 97 % | RESPIRATION RATE: 18 BRPM | HEIGHT: 72 IN | DIASTOLIC BLOOD PRESSURE: 68 MMHG | WEIGHT: 300.4 LBS

## 2018-08-30 LAB
ANION GAP SERPL CALCULATED.3IONS-SCNC: 12 MMOL/L (ref 5–15)
BUN BLD-MCNC: 20 MG/DL (ref 7–21)
BUN/CREAT SERPL: 16.4 (ref 7–25)
C PEPTIDE SERPL-MCNC: 2.1 NG/ML (ref 1.1–4.4)
CALCIUM SPEC-SCNC: 9.6 MG/DL (ref 8.4–10.2)
CHLORIDE SERPL-SCNC: 101 MMOL/L (ref 95–110)
CO2 SERPL-SCNC: 22 MMOL/L (ref 22–31)
CREAT BLD-MCNC: 1.22 MG/DL (ref 0.7–1.3)
GFR SERPL CREATININE-BSD FRML MDRD: 79 ML/MIN/1.73 (ref 63–147)
GLUCOSE BLD-MCNC: 225 MG/DL (ref 60–100)
GLUCOSE BLDC GLUCOMTR-MCNC: 271 MG/DL (ref 70–130)
GLUCOSE BLDC GLUCOMTR-MCNC: 382 MG/DL (ref 70–130)
GLUCOSE BLDC GLUCOMTR-MCNC: 410 MG/DL (ref 70–130)
PANC ISLET CELL AB TITR SER: NEGATIVE {TITER}
POTASSIUM BLD-SCNC: 3.8 MMOL/L (ref 3.5–5.1)
SODIUM BLD-SCNC: 135 MMOL/L (ref 137–145)
WHOLE BLOOD HOLD SPECIMEN: NORMAL

## 2018-08-30 PROCEDURE — 82962 GLUCOSE BLOOD TEST: CPT

## 2018-08-30 PROCEDURE — 63710000001 INSULIN ASPART PER 5 UNITS: Performed by: INTERNAL MEDICINE

## 2018-08-30 PROCEDURE — 63710000001 INSULIN DETEMIR PER 5 UNITS: Performed by: INTERNAL MEDICINE

## 2018-08-30 PROCEDURE — 80048 BASIC METABOLIC PNL TOTAL CA: CPT | Performed by: FAMILY MEDICINE

## 2018-08-30 RX ADMIN — INSULIN DETEMIR 60 UNITS: 100 INJECTION, SOLUTION SUBCUTANEOUS at 08:22

## 2018-08-30 RX ADMIN — LEVOTHYROXINE SODIUM 50 MCG: 50 TABLET ORAL at 06:36

## 2018-08-30 RX ADMIN — INSULIN ASPART 9 UNITS: 100 INJECTION, SOLUTION INTRAVENOUS; SUBCUTANEOUS at 08:17

## 2018-08-30 RX ADMIN — INSULIN ASPART 6 UNITS: 100 INJECTION, SOLUTION INTRAVENOUS; SUBCUTANEOUS at 02:56

## 2018-08-30 RX ADMIN — INSULIN ASPART 21 UNITS: 100 INJECTION, SOLUTION INTRAVENOUS; SUBCUTANEOUS at 08:19

## 2018-08-30 RX ADMIN — INSULIN ASPART 15 UNITS: 100 INJECTION, SOLUTION INTRAVENOUS; SUBCUTANEOUS at 12:05

## 2018-08-30 RX ADMIN — INSULIN ASPART 21 UNITS: 100 INJECTION, SOLUTION INTRAVENOUS; SUBCUTANEOUS at 12:06

## 2018-08-31 ENCOUNTER — READMISSION MANAGEMENT (OUTPATIENT)
Dept: CALL CENTER | Facility: HOSPITAL | Age: 43
End: 2018-08-31

## 2018-08-31 LAB
GAD65 AB SER-ACNC: <5 U/ML (ref 0–5)
GLUCOSE BLDC GLUCOMTR-MCNC: 221 MG/DL (ref 70–130)

## 2018-09-04 ENCOUNTER — READMISSION MANAGEMENT (OUTPATIENT)
Dept: CALL CENTER | Facility: HOSPITAL | Age: 43
End: 2018-09-04

## 2018-09-04 ENCOUNTER — OFFICE VISIT (OUTPATIENT)
Dept: ENDOCRINOLOGY | Facility: CLINIC | Age: 43
End: 2018-09-04

## 2018-09-04 VITALS
HEART RATE: 87 BPM | SYSTOLIC BLOOD PRESSURE: 132 MMHG | OXYGEN SATURATION: 96 % | DIASTOLIC BLOOD PRESSURE: 80 MMHG | WEIGHT: 306.2 LBS | BODY MASS INDEX: 41.47 KG/M2 | HEIGHT: 72 IN

## 2018-09-04 DIAGNOSIS — E53.8 B12 DEFICIENCY: ICD-10-CM

## 2018-09-04 DIAGNOSIS — E78.2 MIXED HYPERLIPIDEMIA: ICD-10-CM

## 2018-09-04 DIAGNOSIS — E06.3 HYPOTHYROIDISM DUE TO HASHIMOTO'S THYROIDITIS: ICD-10-CM

## 2018-09-04 DIAGNOSIS — E29.1 HYPOGONADISM IN MALE: ICD-10-CM

## 2018-09-04 DIAGNOSIS — E11.8 TYPE 2 DIABETES MELLITUS WITH COMPLICATION, UNSPECIFIED LONG TERM INSULIN USE STATUS: Primary | ICD-10-CM

## 2018-09-04 DIAGNOSIS — F32.A DEPRESSION, UNSPECIFIED DEPRESSION TYPE: ICD-10-CM

## 2018-09-04 DIAGNOSIS — E03.8 HYPOTHYROIDISM DUE TO HASHIMOTO'S THYROIDITIS: ICD-10-CM

## 2018-09-04 DIAGNOSIS — I10 ESSENTIAL HYPERTENSION: ICD-10-CM

## 2018-09-04 DIAGNOSIS — E55.9 VITAMIN D DEFICIENCY: ICD-10-CM

## 2018-09-04 PROBLEM — F25.9 SCHIZOAFFECTIVE DISORDER (HCC): Status: RESOLVED | Noted: 2018-05-09 | Resolved: 2018-09-04

## 2018-09-04 PROBLEM — F31.2 BIPOLAR I DISORDER, CURRENT OR MOST RECENT EPISODE MANIC, WITH PSYCHOTIC FEATURES: Status: RESOLVED | Noted: 2017-09-27 | Resolved: 2018-09-04

## 2018-09-04 LAB — GLUCOSE BLDC GLUCOMTR-MCNC: 272 MG/DL (ref 70–130)

## 2018-09-04 PROCEDURE — 99214 OFFICE O/P EST MOD 30 MIN: CPT | Performed by: INTERNAL MEDICINE

## 2018-09-04 PROCEDURE — 82962 GLUCOSE BLOOD TEST: CPT | Performed by: INTERNAL MEDICINE

## 2018-09-04 NOTE — OUTREACH NOTE
Medical Week 1 Survey      Responses   Facility patient discharged from?  [facility]   Does the patient have one of the following disease processes/diagnoses(primary or secondary)?  Other   Is there a successful TCM telephone encounter documented?  No   Week 1 attempt successful?  No   Unsuccessful attempts  Attempt 1          Nata Thomas RN

## 2018-09-04 NOTE — PATIENT INSTRUCTIONS
Keep Toujeo 75 units daily   Every week see what the average waking sugar has been    If that number is more than 130 , you can increase toujeo by 5 units up to 150 units   If you ever wake low ( less than 80 ) you back off 10 units and don't go up again       ===============================================      Keep humalog 5 units per 15 grams of carbohydrate     Plus    151-200: 3 units  201-250 : 6 units  251-300 : 9 units  Above 300 : 12 units    Every week you can look back and see if you have been using the sliding scale more than 50% of the time     If you have, then increase the humalog by 1 = 6 units per 15 grams of carbohydrate , you can keep doing this up to 10 units per 15 grams of carbohydrate       ==================================================    -----------------    Start trulicity 0.75 mg weekly     If nausea , try to eat less, if vomiting, please stop     -----------------    In 2 weeks     Start     xigduo 5/1000 mg tabs    1 w bkfast , after 2 weeks increase to 2 w bkfast

## 2018-09-05 ENCOUNTER — READMISSION MANAGEMENT (OUTPATIENT)
Dept: CALL CENTER | Facility: HOSPITAL | Age: 43
End: 2018-09-05

## 2018-09-05 NOTE — OUTREACH NOTE
Medical Week 1 Survey      Responses   Facility patient discharged from?  [place]   Does the patient have one of the following disease processes/diagnoses(primary or secondary)?  Other   Is there a successful TCM telephone encounter documented?  No   Week 1 attempt successful?  No   Unsuccessful attempts  Attempt 2          Lara Rao RN

## 2018-09-11 ENCOUNTER — READMISSION MANAGEMENT (OUTPATIENT)
Dept: CALL CENTER | Facility: HOSPITAL | Age: 43
End: 2018-09-11

## 2018-09-11 NOTE — OUTREACH NOTE
Medical Week 1 Survey      Responses   Facility patient discharged from?  [place]   Does the patient have one of the following disease processes/diagnoses(primary or secondary)?  Other   Is there a successful TCM telephone encounter documented?  No   Week 1 attempt successful?  No   Unsuccessful attempts  Attempt 3          Patricia Ritchie RN

## 2018-09-12 ENCOUNTER — OFFICE VISIT (OUTPATIENT)
Dept: ENDOCRINOLOGY | Facility: CLINIC | Age: 43
End: 2018-09-12

## 2018-09-12 VITALS
HEART RATE: 68 BPM | SYSTOLIC BLOOD PRESSURE: 136 MMHG | WEIGHT: 311 LBS | DIASTOLIC BLOOD PRESSURE: 98 MMHG | BODY MASS INDEX: 42.12 KG/M2 | HEIGHT: 72 IN

## 2018-09-12 DIAGNOSIS — E03.9 ACQUIRED HYPOTHYROIDISM: ICD-10-CM

## 2018-09-12 DIAGNOSIS — E11.65 UNCONTROLLED TYPE 2 DIABETES MELLITUS WITH MILD NONPROLIFERATIVE RETINOPATHY AND MACULAR EDEMA, WITH LONG-TERM CURRENT USE OF INSULIN, UNSPECIFIED LATERALITY: Primary | ICD-10-CM

## 2018-09-12 DIAGNOSIS — E11.8 TYPE 2 DIABETES MELLITUS WITH COMPLICATION, UNSPECIFIED LONG TERM INSULIN USE STATUS: ICD-10-CM

## 2018-09-12 DIAGNOSIS — Z79.4 UNCONTROLLED TYPE 2 DIABETES MELLITUS WITH MILD NONPROLIFERATIVE RETINOPATHY AND MACULAR EDEMA, WITH LONG-TERM CURRENT USE OF INSULIN, UNSPECIFIED LATERALITY: Primary | ICD-10-CM

## 2018-09-12 DIAGNOSIS — E55.9 VITAMIN D DEFICIENCY: ICD-10-CM

## 2018-09-12 DIAGNOSIS — E11.3219 UNCONTROLLED TYPE 2 DIABETES MELLITUS WITH MILD NONPROLIFERATIVE RETINOPATHY AND MACULAR EDEMA, WITH LONG-TERM CURRENT USE OF INSULIN, UNSPECIFIED LATERALITY: Primary | ICD-10-CM

## 2018-09-12 LAB — GLUCOSE BLDC GLUCOMTR-MCNC: 84 MG/DL (ref 70–130)

## 2018-09-12 PROCEDURE — 99214 OFFICE O/P EST MOD 30 MIN: CPT | Performed by: NURSE PRACTITIONER

## 2018-09-12 PROCEDURE — 82962 GLUCOSE BLOOD TEST: CPT | Performed by: NURSE PRACTITIONER

## 2018-09-12 RX ORDER — LEVOTHYROXINE SODIUM 0.05 MG/1
50 TABLET ORAL DAILY
Qty: 30 TABLET | Refills: 11 | Status: SHIPPED | OUTPATIENT
Start: 2018-09-12 | End: 2019-01-26
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
## 2018-09-13 ENCOUNTER — APPOINTMENT (OUTPATIENT)
Dept: LAB | Facility: HOSPITAL | Age: 43
End: 2018-09-13

## 2018-09-13 ENCOUNTER — OFFICE VISIT (OUTPATIENT)
Dept: FAMILY MEDICINE CLINIC | Facility: CLINIC | Age: 43
End: 2018-09-13

## 2018-09-13 ENCOUNTER — OFFICE VISIT (OUTPATIENT)
Dept: PULMONOLOGY | Facility: CLINIC | Age: 43
End: 2018-09-13

## 2018-09-13 VITALS
OXYGEN SATURATION: 97 % | WEIGHT: 311.3 LBS | BODY MASS INDEX: 42.16 KG/M2 | DIASTOLIC BLOOD PRESSURE: 82 MMHG | HEART RATE: 69 BPM | HEIGHT: 72 IN | SYSTOLIC BLOOD PRESSURE: 118 MMHG

## 2018-09-13 VITALS
WEIGHT: 313.9 LBS | OXYGEN SATURATION: 99 % | HEART RATE: 73 BPM | HEIGHT: 72 IN | DIASTOLIC BLOOD PRESSURE: 98 MMHG | SYSTOLIC BLOOD PRESSURE: 152 MMHG | BODY MASS INDEX: 42.52 KG/M2

## 2018-09-13 DIAGNOSIS — Z09 HOSPITAL DISCHARGE FOLLOW-UP: ICD-10-CM

## 2018-09-13 DIAGNOSIS — E66.01 MORBID OBESITY (HCC): ICD-10-CM

## 2018-09-13 DIAGNOSIS — L50.9 URTICARIA: Primary | ICD-10-CM

## 2018-09-13 DIAGNOSIS — E11.8 TYPE 2 DIABETES MELLITUS WITH COMPLICATION, UNSPECIFIED LONG TERM INSULIN USE STATUS: ICD-10-CM

## 2018-09-13 DIAGNOSIS — F41.9 ANXIETY: Primary | ICD-10-CM

## 2018-09-13 DIAGNOSIS — I10 ESSENTIAL HYPERTENSION: ICD-10-CM

## 2018-09-13 PROCEDURE — 99213 OFFICE O/P EST LOW 20 MIN: CPT | Performed by: STUDENT IN AN ORGANIZED HEALTH CARE EDUCATION/TRAINING PROGRAM

## 2018-09-13 PROCEDURE — 86038 ANTINUCLEAR ANTIBODIES: CPT | Performed by: INTERNAL MEDICINE

## 2018-09-13 PROCEDURE — 86376 MICROSOMAL ANTIBODY EACH: CPT | Performed by: INTERNAL MEDICINE

## 2018-09-13 PROCEDURE — 86003 ALLG SPEC IGE CRUDE XTRC EA: CPT | Performed by: INTERNAL MEDICINE

## 2018-09-13 PROCEDURE — 99203 OFFICE O/P NEW LOW 30 MIN: CPT | Performed by: INTERNAL MEDICINE

## 2018-09-13 PROCEDURE — 86008 ALLG SPEC IGE RECOMB EA: CPT | Performed by: INTERNAL MEDICINE

## 2018-09-13 PROCEDURE — 36415 COLL VENOUS BLD VENIPUNCTURE: CPT | Performed by: INTERNAL MEDICINE

## 2018-09-13 PROCEDURE — 86430 RHEUMATOID FACTOR TEST QUAL: CPT | Performed by: INTERNAL MEDICINE
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
## 2018-09-14 ENCOUNTER — READMISSION MANAGEMENT (OUTPATIENT)
Dept: CALL CENTER | Facility: HOSPITAL | Age: 43
End: 2018-09-14

## 2018-09-14 LAB
ANA SER QL: NEGATIVE
RHEUMATOID FACT SERPL-ACNC: NEGATIVE [IU]/ML
THYROPEROXIDASE AB SERPL-ACNC: >600 IU/ML (ref 0–34)

## 2018-09-14 NOTE — OUTREACH NOTE
Medical Week 1 Survey      Responses   Facility patient discharged from?  [facility]   Does the patient have one of the following disease processes/diagnoses(primary or secondary)?  Other   Is there a successful TCM telephone encounter documented?  No   Week 1 attempt successful?  No   Unsuccessful attempts  Attempt 4          Anthony Clifford RN

## 2018-09-18 LAB
CALIF WALNUT POLN IGE QN: <0.1 KU/L
CLAM IGE QN: <0.1 KU/L
CODFISH IGE QN: <0.1 KU/L
CONV CLASS DESCRIPTION: ABNORMAL
CORN IGE QN: <0.1 KU/L
COW MILK IGE QN: 3.79 KU/L
EGG WHITE IGE QN: <0.1 KU/L
PEANUT IGE QN: <0.1 KU/L
SCALLOP IGE QN: <0.1 KU/L
SESAME SEED IGE: <0.1 KU/L
SHRIMP IGE: <0.1 KU/L
SOYBEAN IGE QN: <0.1 KU/L
WHEAT IGE QN: <0.1 KU/L

## 2018-09-19 LAB
ALPHA GAL IGE: 30.2 KU/L
BEEF IGE QN: 6.63 KU/L
LAMB IGE QN: 2.73 KU/L
Lab: 2
Lab: 2
Lab: 3
PORK IGE: 2.39 KU/L

## 2018-09-24 ENCOUNTER — READMISSION MANAGEMENT (OUTPATIENT)
Dept: CALL CENTER | Facility: HOSPITAL | Age: 43
End: 2018-09-24

## 2018-09-24 NOTE — OUTREACH NOTE
Medical Week 2 Survey      Responses   Facility patient discharged from?  [place]   Does the patient have one of the following disease processes/diagnoses(primary or secondary)?  Other   Week 2 attempt successful?  No   Unsuccessful attempts  Attempt 1          Dianna Palacios RN

## 2018-09-25 ENCOUNTER — READMISSION MANAGEMENT (OUTPATIENT)
Dept: CALL CENTER | Facility: HOSPITAL | Age: 43
End: 2018-09-25

## 2018-09-25 NOTE — OUTREACH NOTE
Medical Week 2 Survey      Responses   Facility patient discharged from?  [place]   Does the patient have one of the following disease processes/diagnoses(primary or secondary)?  Other   Week 2 attempt successful?  No   Unsuccessful attempts  Attempt 2          Liz Siddiqi RN

## 2018-09-26 ENCOUNTER — READMISSION MANAGEMENT (OUTPATIENT)
Dept: CALL CENTER | Facility: HOSPITAL | Age: 43
End: 2018-09-26

## 2018-09-26 NOTE — OUTREACH NOTE
Medical Week 2 Survey      Responses   Facility patient discharged from?  [place]   Does the patient have one of the following disease processes/diagnoses(primary or secondary)?  Other   Week 2 attempt successful?  No   Unsuccessful attempts  Attempt 3          Maureen Ferrari RN

## 2018-10-03 ENCOUNTER — READMISSION MANAGEMENT (OUTPATIENT)
Dept: CALL CENTER | Facility: HOSPITAL | Age: 43
End: 2018-10-03

## 2018-10-03 NOTE — OUTREACH NOTE
Medical Week 3 Survey      Responses   Facility patient discharged from?  [place]   Does the patient have one of the following disease processes/diagnoses(primary or secondary)?  Other   Week 3 attempt successful?  No   Unsuccessful attempts  Attempt 1          Liz Song RN

## 2018-10-04 ENCOUNTER — READMISSION MANAGEMENT (OUTPATIENT)
Dept: CALL CENTER | Facility: HOSPITAL | Age: 43
End: 2018-10-04

## 2018-10-04 NOTE — OUTREACH NOTE
Medical Week 3 Survey      Responses   Facility patient discharged from?  [place]   Does the patient have one of the following disease processes/diagnoses(primary or secondary)?  Other   Week 3 attempt successful?  No   Unsuccessful attempts  Attempt 2          Anthony Clifford RN

## 2018-10-17 LAB
25(OH)D3 SERPL-MCNC: 29.8 NG/ML (ref 30–100)
ALBUMIN SERPL-MCNC: 4.3 G/DL (ref 3.5–5)
ALBUMIN UR-MCNC: 9.8 MG/L
ALBUMIN/GLOB SERPL: 1.3 G/DL (ref 1.1–1.8)
ALP SERPL-CCNC: 47 U/L (ref 38–126)
ALT SERPL W P-5'-P-CCNC: 21 U/L
ANION GAP SERPL CALCULATED.3IONS-SCNC: 9 MMOL/L (ref 5–15)
AST SERPL-CCNC: 22 U/L (ref 17–59)
BASOPHILS # BLD AUTO: 0.02 10*3/MM3 (ref 0–0.2)
BASOPHILS NFR BLD AUTO: 0.5 % (ref 0–2)
BILIRUB SERPL-MCNC: 0.4 MG/DL (ref 0.2–1.3)
BUN BLD-MCNC: 19 MG/DL (ref 9–20)
BUN/CREAT SERPL: 16.4 (ref 7–25)
CALCIUM SPEC-SCNC: 9.5 MG/DL (ref 8.4–10.2)
CHLORIDE SERPL-SCNC: 109 MMOL/L (ref 98–107)
CHOLEST SERPL-MCNC: 192 MG/DL (ref 150–200)
CO2 SERPL-SCNC: 26 MMOL/L (ref 22–30)
CREAT BLD-MCNC: 1.16 MG/DL (ref 0.66–1.25)
CREAT UR-MCNC: 315.2 MG/DL
DEPRECATED RDW RBC AUTO: 47.7 FL (ref 35.1–43.9)
EOSINOPHIL # BLD AUTO: 0.14 10*3/MM3 (ref 0–0.7)
EOSINOPHIL NFR BLD AUTO: 3.4 % (ref 0–7)
ERYTHROCYTE [DISTWIDTH] IN BLOOD BY AUTOMATED COUNT: 14.9 % (ref 11.5–14.5)
GFR SERPL CREATININE-BSD FRML MDRD: 83 ML/MIN/1.73 (ref 63–147)
GLOBULIN UR ELPH-MCNC: 3.4 GM/DL (ref 2.3–3.5)
GLUCOSE BLD-MCNC: 94 MG/DL (ref 74–99)
HBA1C MFR BLD: 9.8 % (ref 4–5.6)
HCT VFR BLD AUTO: 36.4 % (ref 39–49)
HDLC SERPL-MCNC: 41 MG/DL (ref 40–59)
HGB BLD-MCNC: 12.1 G/DL (ref 13.7–17.3)
LDLC SERPL CALC-MCNC: 119 MG/DL
LDLC/HDLC SERPL: 2.91 {RATIO} (ref 0–3.55)
LYMPHOCYTES # BLD AUTO: 1.77 10*3/MM3 (ref 0.6–4.2)
LYMPHOCYTES NFR BLD AUTO: 43.2 % (ref 10–50)
MCH RBC QN AUTO: 30 PG (ref 26.5–34)
MCHC RBC AUTO-ENTMCNC: 33.2 G/DL (ref 31.5–36.3)
MCV RBC AUTO: 90.1 FL (ref 80–98)
MICROALBUMIN/CREAT UR: 31.1 MG/G (ref 0–30)
MONOCYTES # BLD AUTO: 0.34 10*3/MM3 (ref 0–0.9)
MONOCYTES NFR BLD AUTO: 8.3 % (ref 0–12)
NEUTROPHILS # BLD AUTO: 1.83 10*3/MM3 (ref 2–8.6)
NEUTROPHILS NFR BLD AUTO: 44.6 % (ref 37–80)
PLATELET # BLD AUTO: 249 10*3/MM3 (ref 150–450)
PMV BLD AUTO: 9.3 FL (ref 8–12)
POTASSIUM BLD-SCNC: 4 MMOL/L (ref 3.4–5)
PROT SERPL-MCNC: 7.7 G/DL (ref 6.3–8.2)
RBC # BLD AUTO: 4.04 10*6/MM3 (ref 4.37–5.74)
SODIUM BLD-SCNC: 144 MMOL/L (ref 137–145)
TRIGL SERPL-MCNC: 159 MG/DL
TSH SERPL DL<=0.05 MIU/L-ACNC: 3.64 MIU/ML (ref 0.46–4.68)
VIT B12 BLD-MCNC: 563 PG/ML (ref 239–931)
VLDLC SERPL-MCNC: 31.8 MG/DL
WBC NRBC COR # BLD: 4.1 10*3/MM3 (ref 3.2–9.8)

## 2018-10-17 PROCEDURE — 84410 TESTOSTERONE BIOAVAILABLE: CPT | Performed by: INTERNAL MEDICINE

## 2018-10-17 PROCEDURE — 80061 LIPID PANEL: CPT | Performed by: INTERNAL MEDICINE

## 2018-10-17 PROCEDURE — 84443 ASSAY THYROID STIM HORMONE: CPT | Performed by: INTERNAL MEDICINE

## 2018-10-17 PROCEDURE — 80053 COMPREHEN METABOLIC PANEL: CPT | Performed by: INTERNAL MEDICINE

## 2018-10-17 PROCEDURE — 82043 UR ALBUMIN QUANTITATIVE: CPT | Performed by: INTERNAL MEDICINE

## 2018-10-17 PROCEDURE — 82607 VITAMIN B-12: CPT | Performed by: INTERNAL MEDICINE

## 2018-10-17 PROCEDURE — 85025 COMPLETE CBC W/AUTO DIFF WBC: CPT | Performed by: INTERNAL MEDICINE

## 2018-10-17 PROCEDURE — 84153 ASSAY OF PSA TOTAL: CPT | Performed by: INTERNAL MEDICINE

## 2018-10-17 PROCEDURE — 83036 HEMOGLOBIN GLYCOSYLATED A1C: CPT | Performed by: INTERNAL MEDICINE

## 2018-10-17 PROCEDURE — 36415 COLL VENOUS BLD VENIPUNCTURE: CPT | Performed by: INTERNAL MEDICINE

## 2018-10-17 PROCEDURE — 82570 ASSAY OF URINE CREATININE: CPT | Performed by: INTERNAL MEDICINE

## 2018-10-17 PROCEDURE — 82306 VITAMIN D 25 HYDROXY: CPT | Performed by: INTERNAL MEDICINE

## 2018-10-19 LAB
PSA SERPL-MCNC: 0.7 NG/ML (ref 0–4)
REFLEX: NORMAL

## 2018-10-24 ENCOUNTER — OFFICE VISIT (OUTPATIENT)
Dept: ENDOCRINOLOGY | Facility: CLINIC | Age: 43
End: 2018-10-24

## 2018-10-24 VITALS
HEIGHT: 72 IN | HEART RATE: 83 BPM | WEIGHT: 315 LBS | BODY MASS INDEX: 42.66 KG/M2 | SYSTOLIC BLOOD PRESSURE: 136 MMHG | DIASTOLIC BLOOD PRESSURE: 82 MMHG

## 2018-10-24 DIAGNOSIS — E78.2 MIXED HYPERLIPIDEMIA: ICD-10-CM

## 2018-10-24 DIAGNOSIS — E03.9 ACQUIRED HYPOTHYROIDISM: ICD-10-CM

## 2018-10-24 DIAGNOSIS — IMO0002 TYPE II DIABETES MELLITUS WITH COMPLICATION, UNCONTROLLED: Primary | ICD-10-CM

## 2018-10-24 DIAGNOSIS — E29.1 MALE HYPOGONADISM: ICD-10-CM

## 2018-10-24 DIAGNOSIS — E55.9 VITAMIN D DEFICIENCY: ICD-10-CM

## 2018-10-24 DIAGNOSIS — E53.8 B12 DEFICIENCY: ICD-10-CM

## 2018-10-24 LAB
BIOAVAILABLE TESTOSTERONE, %: 52.4 %
BIOAVAILABLE TESTOSTERONE, S: 97 NG/DL
GLUCOSE BLDC GLUCOMTR-MCNC: 119 MG/DL (ref 70–130)
TESTOST SERPL-MCNC: 186 NG/DL

## 2018-10-24 PROCEDURE — 99214 OFFICE O/P EST MOD 30 MIN: CPT | Performed by: NURSE PRACTITIONER

## 2018-10-24 PROCEDURE — 82962 GLUCOSE BLOOD TEST: CPT | Performed by: NURSE PRACTITIONER

## 2018-10-24 RX ORDER — ERGOCALCIFEROL 1.25 MG/1
50000 CAPSULE ORAL 2 TIMES WEEKLY
Qty: 8 CAPSULE | Refills: 5 | Status: SHIPPED | OUTPATIENT
Start: 2018-10-25 | End: 2019-01-26

## 2018-10-24 RX ORDER — TESTOSTERONE CYPIONATE 200 MG/ML
100 INJECTION, SOLUTION INTRAMUSCULAR WEEKLY
Qty: 2 ML | Refills: 5 | Status: SHIPPED | OUTPATIENT
Start: 2018-10-24 | End: 2019-01-26

## 2018-10-24 RX ORDER — ESOMEPRAZOLE MAGNESIUM 40 MG/1
40 CAPSULE, DELAYED RELEASE ORAL DAILY
Qty: 30 CAPSULE | Refills: 11 | Status: SHIPPED | OUTPATIENT
Start: 2018-10-24 | End: 2019-01-26

## 2018-10-24 RX ORDER — CYANOCOBALAMIN 1000 UG/ML
INJECTION, SOLUTION INTRAMUSCULAR; SUBCUTANEOUS
Qty: 2 ML | Refills: 3 | Status: SHIPPED | OUTPATIENT
Start: 2018-10-24 | End: 2019-08-14 | Stop reason: SDDI
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
## 2018-11-06 RX ORDER — ENALAPRIL MALEATE 10 MG/1
10 TABLET ORAL DAILY
Qty: 14 TABLET | Refills: 0 | Status: SHIPPED | OUTPATIENT
Start: 2018-11-06 | End: 2019-02-26 | Stop reason: SDUPTHER

## 2018-11-08 RX ORDER — ENALAPRIL MALEATE 10 MG/1
10 TABLET ORAL DAILY
Qty: 30 TABLET | Refills: 3 | Status: SHIPPED | OUTPATIENT
Start: 2018-11-08 | End: 2019-01-26

## 2018-11-20 ENCOUNTER — OFFICE VISIT (OUTPATIENT)
Dept: PULMONOLOGY | Facility: CLINIC | Age: 43
End: 2018-11-20

## 2018-11-20 VITALS
BODY MASS INDEX: 42.66 KG/M2 | HEART RATE: 74 BPM | OXYGEN SATURATION: 95 % | SYSTOLIC BLOOD PRESSURE: 130 MMHG | WEIGHT: 315 LBS | HEIGHT: 72 IN | DIASTOLIC BLOOD PRESSURE: 84 MMHG

## 2018-11-20 DIAGNOSIS — E88.09 ALPHA GALACTOSIDASE DEFICIENCY: Primary | ICD-10-CM

## 2018-11-20 DIAGNOSIS — Z91.018 ALLERGY TO MEAT: ICD-10-CM

## 2018-11-20 PROBLEM — Z90.49 HISTORY OF CHOLECYSTECTOMY: Status: ACTIVE | Noted: 2018-11-20

## 2018-11-20 PROBLEM — M10.9 GOUTY ARTHRITIS: Status: ACTIVE | Noted: 2018-11-20

## 2018-11-20 PROBLEM — S69.92XA: Status: ACTIVE | Noted: 2018-11-20

## 2018-11-20 PROBLEM — S22.20XA: Status: ACTIVE | Noted: 2018-11-20

## 2018-11-20 PROBLEM — F29 PSYCHOSIS: Status: ACTIVE | Noted: 2018-05-09

## 2018-11-20 PROBLEM — M79.602 PAIN OF LEFT UPPER EXTREMITY: Status: ACTIVE | Noted: 2018-11-20

## 2018-11-20 PROBLEM — S82.841A BIMALLEOLAR FRACTURE OF RIGHT ANKLE: Status: ACTIVE | Noted: 2018-11-20

## 2018-11-20 PROBLEM — S20.219A CONTUSION, CHEST WALL: Status: ACTIVE | Noted: 2018-11-20

## 2018-11-20 PROCEDURE — 99212 OFFICE O/P EST SF 10 MIN: CPT | Performed by: INTERNAL MEDICINE

## 2018-11-20 NOTE — PROGRESS NOTES
"This gentleman has recurring urticaria.  Blood test revealed Hashimoto's thyroiditis, milk allergy, alpha gal with the pork and Lamb allergy      ROS    Constitutional-no night sweats weight loss headaches  GI no abdominal pain nausea or diarrhea  Neuro no seizure or neurologic deficits  Musculoskeletal no deformity or joint pain   no dysuria or hematuria  Skin no rash or other lesions  All other systems reviewed and were negative except for the above.      Physical Exam  /84   Pulse 74   Ht 182.9 cm (72\")   Wt (!) 148 kg (326 lb)   SpO2 95%   BMI 44.21 kg/m²   Vital signs as above  Pupils equally round and reactive to light and accommodation, neck no JVD or adenopathy.  Cardiovascular regular rhythm and rate no murmur or gallop.  Abdomen soft no organomegaly tenderness.  Extremities no clubbing cyanosis or edema.  No cervical adenopathy.  No skin rash.  Neurologic good strength bilaterally without deficits    Impression multiple food allergies    Plan avoidance of red meat and milk    Return as needed        This document has been produced with the assistance of Dragon dictation  This document has been electronically signed by Terrence Vallecillo MD on November 20, 2018 2:54 PM      "

## 2019-01-24 RX ORDER — HYDROCHLOROTHIAZIDE 25 MG/1
25 TABLET ORAL DAILY
Qty: 30 TABLET | Refills: 5 | Status: SHIPPED | OUTPATIENT
Start: 2019-01-24 | End: 2021-01-28

## 2019-01-26 ENCOUNTER — HOSPITAL ENCOUNTER (EMERGENCY)
Facility: HOSPITAL | Age: 44
Discharge: HOME | End: 2019-01-26
Attending: EMERGENCY MEDICINE | Admitting: EMERGENCY MEDICINE

## 2019-01-26 VITALS
WEIGHT: 314.5 LBS | HEIGHT: 72 IN | SYSTOLIC BLOOD PRESSURE: 128 MMHG | BODY MASS INDEX: 42.6 KG/M2 | OXYGEN SATURATION: 95 % | TEMPERATURE: 97.6 F | RESPIRATION RATE: 18 BRPM | HEART RATE: 103 BPM | DIASTOLIC BLOOD PRESSURE: 85 MMHG

## 2019-01-26 DIAGNOSIS — F32.A DEPRESSIVE DISORDER: ICD-10-CM

## 2019-01-26 DIAGNOSIS — G47.00 INSOMNIA, UNSPECIFIED TYPE: ICD-10-CM

## 2019-01-26 DIAGNOSIS — F41.9 ANXIETY: Primary | ICD-10-CM

## 2019-01-26 LAB
ALBUMIN SERPL-MCNC: 4.8 G/DL (ref 3.4–4.8)
ALBUMIN/GLOB SERPL: 1.5 G/DL (ref 1.1–1.8)
ALP SERPL-CCNC: 56 U/L (ref 38–126)
ALT SERPL W P-5'-P-CCNC: 48 U/L (ref 21–72)
AMPHET+METHAMPHET UR QL: NEGATIVE
ANION GAP SERPL CALCULATED.3IONS-SCNC: 13 MMOL/L (ref 5–15)
APAP SERPL-MCNC: <10 MCG/ML (ref 10–30)
AST SERPL-CCNC: 31 U/L (ref 17–59)
BARBITURATES UR QL SCN: NEGATIVE
BASOPHILS # BLD AUTO: 0.02 10*3/MM3 (ref 0–0.2)
BASOPHILS NFR BLD AUTO: 0.4 % (ref 0–2)
BENZODIAZ UR QL SCN: NEGATIVE
BILIRUB SERPL-MCNC: 0.4 MG/DL (ref 0.2–1.3)
BILIRUB UR QL STRIP: NEGATIVE
BUN BLD-MCNC: 11 MG/DL (ref 7–21)
BUN/CREAT SERPL: 8.2 (ref 7–25)
CALCIUM SPEC-SCNC: 9.8 MG/DL (ref 8.4–10.2)
CANNABINOIDS SERPL QL: POSITIVE
CHLORIDE SERPL-SCNC: 100 MMOL/L (ref 95–110)
CLARITY UR: CLEAR
CO2 SERPL-SCNC: 21 MMOL/L (ref 22–31)
COCAINE UR QL: NEGATIVE
COLOR UR: YELLOW
CREAT BLD-MCNC: 1.34 MG/DL (ref 0.7–1.3)
DEPRECATED RDW RBC AUTO: 40.7 FL (ref 35.1–43.9)
EOSINOPHIL # BLD AUTO: 0.06 10*3/MM3 (ref 0–0.7)
EOSINOPHIL NFR BLD AUTO: 1.3 % (ref 0–7)
ERYTHROCYTE [DISTWIDTH] IN BLOOD BY AUTOMATED COUNT: 14.3 % (ref 11.5–14.5)
ETHANOL BLD-MCNC: <10 MG/DL (ref 0–10)
ETHANOL UR QL: <0.01 %
GFR SERPL CREATININE-BSD FRML MDRD: 71 ML/MIN/1.73 (ref 63–147)
GLOBULIN UR ELPH-MCNC: 3.1 GM/DL (ref 2.3–3.5)
GLUCOSE BLD-MCNC: 155 MG/DL (ref 60–100)
GLUCOSE BLDC GLUCOMTR-MCNC: 140 MG/DL (ref 70–130)
GLUCOSE UR STRIP-MCNC: NEGATIVE MG/DL
HCT VFR BLD AUTO: 38.6 % (ref 39–49)
HGB BLD-MCNC: 13.7 G/DL (ref 13.7–17.3)
HGB UR QL STRIP.AUTO: NEGATIVE
HOLD SPECIMEN: NORMAL
HOLD SPECIMEN: NORMAL
IMM GRANULOCYTES # BLD AUTO: 0 10*3/MM3 (ref 0–0.02)
IMM GRANULOCYTES NFR BLD AUTO: 0 % (ref 0–0.5)
KETONES UR QL STRIP: NEGATIVE
LEUKOCYTE ESTERASE UR QL STRIP.AUTO: NEGATIVE
LYMPHOCYTES # BLD AUTO: 1.91 10*3/MM3 (ref 0.6–4.2)
LYMPHOCYTES NFR BLD AUTO: 42 % (ref 10–50)
MCH RBC QN AUTO: 28.1 PG (ref 26.5–34)
MCHC RBC AUTO-ENTMCNC: 35.5 G/DL (ref 31.5–36.3)
MCV RBC AUTO: 79.1 FL (ref 80–98)
METHADONE UR QL SCN: NEGATIVE
MONOCYTES # BLD AUTO: 0.45 10*3/MM3 (ref 0–0.9)
MONOCYTES NFR BLD AUTO: 9.9 % (ref 0–12)
NEUTROPHILS # BLD AUTO: 2.11 10*3/MM3 (ref 2–8.6)
NEUTROPHILS NFR BLD AUTO: 46.4 % (ref 37–80)
NITRITE UR QL STRIP: NEGATIVE
OPIATES UR QL: NEGATIVE
OXYCODONE UR QL SCN: NEGATIVE
PH UR STRIP.AUTO: 5.5 [PH] (ref 5–9)
PLATELET # BLD AUTO: 276 10*3/MM3 (ref 150–450)
PMV BLD AUTO: 8.8 FL (ref 8–12)
POTASSIUM BLD-SCNC: 3.9 MMOL/L (ref 3.5–5.1)
PROT SERPL-MCNC: 7.9 G/DL (ref 6.3–8.6)
PROT UR QL STRIP: NEGATIVE
RBC # BLD AUTO: 4.88 10*6/MM3 (ref 4.37–5.74)
SALICYLATES SERPL-MCNC: <1 MG/DL (ref 10–20)
SODIUM BLD-SCNC: 134 MMOL/L (ref 137–145)
SP GR UR STRIP: 1.01 (ref 1–1.03)
TSH SERPL DL<=0.05 MIU/L-ACNC: 4.01 MIU/ML (ref 0.46–4.68)
UROBILINOGEN UR QL STRIP: NORMAL
WBC NRBC COR # BLD: 4.55 10*3/MM3 (ref 3.2–9.8)
WHOLE BLOOD HOLD SPECIMEN: NORMAL
WHOLE BLOOD HOLD SPECIMEN: NORMAL

## 2019-01-26 PROCEDURE — 81003 URINALYSIS AUTO W/O SCOPE: CPT | Performed by: EMERGENCY MEDICINE

## 2019-01-26 PROCEDURE — 85025 COMPLETE CBC W/AUTO DIFF WBC: CPT | Performed by: EMERGENCY MEDICINE

## 2019-01-26 PROCEDURE — 80307 DRUG TEST PRSMV CHEM ANLYZR: CPT | Performed by: EMERGENCY MEDICINE

## 2019-01-26 PROCEDURE — 99284 EMERGENCY DEPT VISIT MOD MDM: CPT

## 2019-01-26 PROCEDURE — 84443 ASSAY THYROID STIM HORMONE: CPT | Performed by: EMERGENCY MEDICINE

## 2019-01-26 PROCEDURE — 80053 COMPREHEN METABOLIC PANEL: CPT | Performed by: EMERGENCY MEDICINE

## 2019-01-26 PROCEDURE — 82962 GLUCOSE BLOOD TEST: CPT

## 2019-01-26 RX ORDER — OLANZAPINE 10 MG/1
10 TABLET ORAL NIGHTLY
COMMUNITY
End: 2020-12-22

## 2019-01-26 RX ORDER — ALPRAZOLAM 2 MG/1
2 TABLET ORAL DAILY PRN
COMMUNITY
End: 2019-02-26

## 2019-01-26 RX ORDER — LORAZEPAM 1 MG/1
2 TABLET ORAL ONCE
Status: COMPLETED | OUTPATIENT
Start: 2019-01-26 | End: 2019-01-26

## 2019-01-26 RX ORDER — ALPRAZOLAM 1 MG/1
1 TABLET ORAL 2 TIMES DAILY PRN
Qty: 4 TABLET | Refills: 0 | Status: SHIPPED | OUTPATIENT
Start: 2019-01-26 | End: 2019-02-26

## 2019-01-26 RX ADMIN — LORAZEPAM 2 MG: 1 TABLET ORAL at 15:52

## 2019-01-26 NOTE — ED NOTES
Spoke with Sejal from [facility] about evaluation. Sejal will notify the mental health provider about needing an evaluation.     Fauzia Narayan RN  01/26/19 [id number]

## 2019-01-26 NOTE — ED TRIAGE NOTES
Pt presents to ED with c/o insomnia and increased anxiousness the past four to five days. Pt reports he quit taking his zyprexa because he didn't think he needed it anymore but took it yesterday.

## 2019-01-26 NOTE — ED NOTES
[facility] contacted to verify they received paperwork on pt; informed they received our fax.     Fauzia Narayan, RN  01/26/19 [id number]
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
## 2019-01-27 NOTE — DISCHARGE INSTRUCTIONS
Follow-up with your primary-care hent failure while mental health for reevaluation.  Return to ER/call 911 if again have worsening symptoms or have any suicidal/homicidal ideations.

## 2019-02-26 ENCOUNTER — OFFICE VISIT (OUTPATIENT)
Dept: ENDOCRINOLOGY | Facility: CLINIC | Age: 44
End: 2019-02-26

## 2019-02-26 VITALS
WEIGHT: 315 LBS | HEIGHT: 72 IN | BODY MASS INDEX: 42.66 KG/M2 | DIASTOLIC BLOOD PRESSURE: 80 MMHG | HEART RATE: 86 BPM | OXYGEN SATURATION: 94 % | SYSTOLIC BLOOD PRESSURE: 142 MMHG

## 2019-02-26 DIAGNOSIS — IMO0002 TYPE II DIABETES MELLITUS WITH COMPLICATION, UNCONTROLLED: Primary | ICD-10-CM

## 2019-02-26 LAB
GLUCOSE BLDC GLUCOMTR-MCNC: 179 MG/DL (ref 70–130)
HBA1C MFR BLD: 7.4 %

## 2019-02-26 PROCEDURE — 82962 GLUCOSE BLOOD TEST: CPT | Performed by: INTERNAL MEDICINE

## 2019-02-26 PROCEDURE — 99214 OFFICE O/P EST MOD 30 MIN: CPT | Performed by: INTERNAL MEDICINE

## 2019-02-26 PROCEDURE — 83036 HEMOGLOBIN GLYCOSYLATED A1C: CPT | Performed by: INTERNAL MEDICINE

## 2019-02-26 RX ORDER — LEVOTHYROXINE SODIUM 0.05 MG/1
50 TABLET ORAL DAILY
Qty: 30 TABLET | Refills: 11 | Status: SHIPPED | OUTPATIENT
Start: 2019-02-26 | End: 2019-08-14 | Stop reason: SDDI

## 2019-02-26 RX ORDER — ENALAPRIL MALEATE 10 MG/1
10 TABLET ORAL DAILY
Qty: 30 TABLET | Refills: 11 | Status: SHIPPED | OUTPATIENT
Start: 2019-02-26 | End: 2021-02-03 | Stop reason: HOSPADM

## 2019-03-22 ENCOUNTER — OFFICE VISIT (OUTPATIENT)
Dept: FAMILY MEDICINE CLINIC | Facility: CLINIC | Age: 44
End: 2019-03-22

## 2019-03-22 VITALS
WEIGHT: 315 LBS | BODY MASS INDEX: 42.66 KG/M2 | DIASTOLIC BLOOD PRESSURE: 90 MMHG | HEART RATE: 103 BPM | OXYGEN SATURATION: 96 % | HEIGHT: 72 IN | SYSTOLIC BLOOD PRESSURE: 148 MMHG

## 2019-03-22 DIAGNOSIS — G47.00 INSOMNIA, UNSPECIFIED TYPE: ICD-10-CM

## 2019-03-22 DIAGNOSIS — N28.9 ABNORMAL RENAL FUNCTION: ICD-10-CM

## 2019-03-22 DIAGNOSIS — I10 ESSENTIAL HYPERTENSION: ICD-10-CM

## 2019-03-22 DIAGNOSIS — Z91.018 FOOD ALLERGY: ICD-10-CM

## 2019-03-22 DIAGNOSIS — IMO0002 TYPE II DIABETES MELLITUS WITH COMPLICATION, UNCONTROLLED: Primary | ICD-10-CM

## 2019-03-22 LAB
ALBUMIN SERPL-MCNC: 5.1 G/DL (ref 3.5–5)
ALBUMIN/GLOB SERPL: 1.3 G/DL (ref 1.1–1.8)
ALP SERPL-CCNC: 70 U/L (ref 38–126)
ALT SERPL W P-5'-P-CCNC: 36 U/L
ANION GAP SERPL CALCULATED.3IONS-SCNC: 15 MMOL/L (ref 5–15)
AST SERPL-CCNC: 32 U/L (ref 17–59)
BILIRUB SERPL-MCNC: 0.6 MG/DL (ref 0.2–1.3)
BUN BLD-MCNC: 18 MG/DL (ref 9–20)
BUN/CREAT SERPL: 13.3 (ref 7–25)
CALCIUM SPEC-SCNC: 10 MG/DL (ref 8.4–10.2)
CHLORIDE SERPL-SCNC: 102 MMOL/L (ref 98–107)
CO2 SERPL-SCNC: 22 MMOL/L (ref 22–30)
CREAT BLD-MCNC: 1.35 MG/DL (ref 0.66–1.25)
GFR SERPL CREATININE-BSD FRML MDRD: 70 ML/MIN/1.73 (ref 63–147)
GLOBULIN UR ELPH-MCNC: 3.9 GM/DL (ref 2.3–3.5)
GLUCOSE BLD-MCNC: 217 MG/DL (ref 74–99)
POTASSIUM BLD-SCNC: 4 MMOL/L (ref 3.4–5)
PROT SERPL-MCNC: 9 G/DL (ref 6.3–8.2)
SODIUM BLD-SCNC: 139 MMOL/L (ref 137–145)

## 2019-03-22 PROCEDURE — 36415 COLL VENOUS BLD VENIPUNCTURE: CPT | Performed by: FAMILY MEDICINE

## 2019-03-22 PROCEDURE — 80053 COMPREHEN METABOLIC PANEL: CPT | Performed by: FAMILY MEDICINE

## 2019-03-22 PROCEDURE — 99214 OFFICE O/P EST MOD 30 MIN: CPT | Performed by: FAMILY MEDICINE

## 2019-03-22 RX ORDER — ESZOPICLONE 3 MG/1
3 TABLET, FILM COATED ORAL NIGHTLY
Qty: 30 TABLET | Refills: 2 | Status: SHIPPED | OUTPATIENT
Start: 2019-03-22 | End: 2019-08-14

## 2019-03-22 RX ORDER — ESOMEPRAZOLE MAGNESIUM 40 MG/1
40 CAPSULE, DELAYED RELEASE ORAL
Qty: 30 CAPSULE | Refills: 5 | Status: SHIPPED | OUTPATIENT
Start: 2019-03-22 | End: 2019-08-14 | Stop reason: SDDI

## 2019-03-22 NOTE — PROGRESS NOTES
Subjective   Kuldip Castaneda is a 43 y.o. male who presents to the office for follow-up on several issues.  He has diabetes and was recently hospitalized.  His medications were changed and he is now on Toujeo and Humalog.  He seems to be doing better and is making efforts with diet and exercise with the diabetes as well.  He also saw an allergist regarding the alpha gal and is following up with him.  His kidney functions were elevated while in the hospital need to be rechecked.    History of Present Illness The following portions of the patient's history were reviewed and updated as appropriate: allergies, current medications, past family history, past medical history, past social history, past surgical history and problem list.    Review of Systems   HENT: Negative for sneezing and trouble swallowing.    Eyes: Negative for visual disturbance.   Respiratory: Negative for chest tightness and wheezing.    Cardiovascular: Negative for leg swelling.   Gastrointestinal: Positive for abdominal distention.   Genitourinary: Negative for urgency.   Musculoskeletal: Negative for back pain.   Neurological: Negative for dizziness.   Psychiatric/Behavioral: Negative for behavioral problems. The patient is not nervous/anxious.             All other systems reviewed and are negative.      Objective   Physical Exam   Constitutional: He appears well-developed and well-nourished. He is cooperative. He does not have a sickly appearance. He does not appear ill.   HENT:   Head: Normocephalic and atraumatic.   Right Ear: External ear normal.   Left Ear: External ear normal.   Nose: Nose normal.   Mouth/Throat: Oropharynx is clear and moist.   Eyes: Conjunctivae and EOM are normal. Pupils are equal, round, and reactive to light. Right eye exhibits no discharge. Left eye exhibits no discharge. No scleral icterus.   Neck: Trachea normal, normal range of motion and phonation normal. Neck supple. No thyromegaly present.        Cardiovascular: Normal rate, regular rhythm, normal heart sounds and intact distal pulses. Exam reveals no gallop and no friction rub.   No murmur heard.  Pulmonary/Chest: Effort normal and breath sounds normal. No respiratory distress. He has no wheezes. He has no rales.   Abdominal: Soft. Normal appearance and bowel sounds are normal. He exhibits no distension. There is no tenderness. There is no rebound and no guarding.   Mild tenderness to deep palpation left upper and lower quadrant but no masses rebound or guarding    Musculoskeletal: Normal range of motion. He exhibits no edema.        Lumbar back: Pain: chronic, rates pain a 5.     Vascular Status -  His right foot exhibits abnormal foot vasculature . His right foot exhibits no edema. His left foot exhibits abnormal foot vasculature . His left foot exhibits no edema.  Lymphadenopathy:     He has no cervical adenopathy.   Neurological: No cranial nerve deficit.   Skin: Skin is warm and dry. No rash noted.   Psychiatric: He has a normal mood and affect. His behavior is normal. Judgment and thought content normal.   Nursing note and vitals reviewed.      Assessment/Plan   Kuldip was seen today for hypertension and hyperlipidemia.    Diagnoses and all orders for this visit:    Type II diabetes mellitus with complication, uncontrolled (CMS/[facility])  -     Comprehensive Metabolic Panel    Abnormal renal function    Insomnia, unspecified type    Essential hypertension    Food allergy  Comments:  alpha gal    Other orders  -     esomeprazole (NEXIUM) 40 MG capsule; Take 1 capsule by mouth Every Morning Before Breakfast.  -     eszopiclone (LUNESTA) 3 MG tablet; Take 1 tablet by mouth Every Night. Take immediately before bedtime.    Follow-up with endocrinologist as scheduled.  Will have repeat testing done for renal functions in the interim. We need to follow this closely suspect early diabetic renal disease.    He is continuing to work on diet and exercise will  continue with the current insulin regimen for diabetes.  Test sugars 3-4 times a day and when needed.    Follow-up with allergy/immunology regarding the alpha gal syndrome.    Blood pressure is a little bit high today but he said he has been rushing around at home it usually runs around 120/80.  He will continue to monitor and contact me if it is not down to goal, 130/80 or less consistently.     .      This document has been electronically signed by Aura Carrillo MD on March 22, 2019 8:53 AM

## 2019-03-22 NOTE — PROGRESS NOTES
Please call the patient regarding his abnormal result.  Let him know the kidney functions are still on the high side but have not changed, no better no worse.  Will recheck in 3 months.

## 2019-03-25 DIAGNOSIS — I10 ESSENTIAL HYPERTENSION: Primary | ICD-10-CM

## 2019-08-14 ENCOUNTER — OFFICE VISIT (OUTPATIENT)
Dept: FAMILY MEDICINE CLINIC | Facility: CLINIC | Age: 44
End: 2019-08-14

## 2019-08-14 VITALS
WEIGHT: 313 LBS | DIASTOLIC BLOOD PRESSURE: 94 MMHG | SYSTOLIC BLOOD PRESSURE: 152 MMHG | HEIGHT: 72 IN | BODY MASS INDEX: 42.39 KG/M2

## 2019-08-14 DIAGNOSIS — E66.01 MORBID OBESITY (HCC): ICD-10-CM

## 2019-08-14 DIAGNOSIS — I10 ESSENTIAL HYPERTENSION: ICD-10-CM

## 2019-08-14 DIAGNOSIS — L30.9 DERMATITIS: ICD-10-CM

## 2019-08-14 DIAGNOSIS — E11.8 TYPE 2 DIABETES MELLITUS WITH COMPLICATION, UNSPECIFIED WHETHER LONG TERM INSULIN USE: Primary | ICD-10-CM

## 2019-08-14 PROCEDURE — 99213 OFFICE O/P EST LOW 20 MIN: CPT | Performed by: FAMILY MEDICINE

## 2019-08-14 RX ORDER — KETOCONAZOLE 20 MG/G
CREAM TOPICAL DAILY
Qty: 60 G | Refills: 5 | Status: SHIPPED | OUTPATIENT
Start: 2019-08-14 | End: 2021-01-28

## 2019-08-14 NOTE — PROGRESS NOTES
"S: Mr. Castaneda is a 44 year old Black male with past medical history of type 2 diabetes, hyperlipidemia, hypertension, and bipolar disorder with psychotic feature who presents with itching in his gluteal region of \"few weeks\" duration. He was worried about having a staph infection, as he has never had symptoms like this before. The region is not painful, but he says it has become exceedingly annoying, especially after sweating. On examination, the rash appeared as scaly plaques in his medial gluteal region. Mr. Castaneda has been prescribed insulin therapy, but has not been taking his medications or checking his blood sugar for the past 6 months. He states that the diabetes medications make him feel abdominal discomfort. Mr. Castaneda states the only medications that he has been taking consistently are his hypertension medications. He has been making an active effort to exercise more and eat a diet with less carbohydrates. He reports no unusual stressors in his home life, and is currently unemployed.    Review of systems positive for persistent fatigue and nausea during exercise. He reports no fevers, headaches, dizziness, chest pains, heart palpitations, leg swelling, bowel or urine changes.    O: Well-developed, well-nourished man in no acute distress. HEENT atraumatic. Tympanic membranes intact. Lungs clear to auscultation bilaterally. Heart regular rate and rhythm with no murmurs, gallops, or rubs. Skin warm and dry with no cyanosis, clubbing or edema. Scaly plaques notable in medial gluteal region. Thought process and thought content appropriate.    A&P:   1. Fungal infection in medial gluteal region: Patient advised to purchase and use Tinactin powder in area when sweaty and prescribed ketoconazole cream to apply to area. Opportunistic fungal infection potentially secondary to uncontrolled diabetes.    2. Uncontrolled diabetes: Patient advised to begin checking blood sugar again and given new meter device. Advised to " continue healthy diet and exercise regiment. Labs ordered for HbAIC, Lipid panel, CMP.    Agree with above note by medical student        This document has been electronically signed by Aura Carrillo MD on August 14, 2019 4:12 PM

## 2019-08-14 NOTE — PROGRESS NOTES
Subjective   Kuldipbret Castaneda is a 44 y.o. male who presents to the office for a few concerns.  He is developed a rash and sometimes it is itchy.  He said no bumps or drainage.  He reports that he quit taking his insulin as it did not make him feel good.  He has not been testing his blood sugars at home and he wants to try to treat his symptoms without medication, through diet and exercise.    History of Present Illness The following portions of the patient's history were reviewed and updated as appropriate: allergies, current medications, past family history, past medical history, past social history, past surgical history and problem list.    Review of Systems   HENT: Negative for sneezing and trouble swallowing.    Eyes: Negative for visual disturbance.   Respiratory: Negative for chest tightness and wheezing.    Cardiovascular: Negative for leg swelling.   Gastrointestinal: Positive for abdominal distention.   Genitourinary: Negative for urgency.   Musculoskeletal: Negative for back pain.   Skin: Positive for rash.   Neurological: Negative for dizziness.   Psychiatric/Behavioral: Negative for behavioral problems. The patient is not nervous/anxious.             All other systems reviewed and are negative.      Objective   Physical Exam   Constitutional: He appears well-developed and well-nourished. He is cooperative. He does not have a sickly appearance. He does not appear ill.   HENT:   Head: Normocephalic and atraumatic.   Right Ear: External ear normal.   Left Ear: External ear normal.   Nose: Nose normal.   Mouth/Throat: Oropharynx is clear and moist.   Eyes: Conjunctivae and EOM are normal. Pupils are equal, round, and reactive to light. Right eye exhibits no discharge. Left eye exhibits no discharge. No scleral icterus.   Neck: Trachea normal, normal range of motion and phonation normal. Neck supple. No thyromegaly present.       Cardiovascular: Normal rate, regular rhythm, normal heart sounds and intact  distal pulses. Exam reveals no gallop and no friction rub.   No murmur heard.  Pulmonary/Chest: Effort normal and breath sounds normal. No respiratory distress. He has no wheezes. He has no rales.   Abdominal: Soft. Normal appearance and bowel sounds are normal. He exhibits no distension. There is no tenderness. There is no rebound and no guarding.   Mild tenderness to deep palpation left upper and lower quadrant but no masses rebound or guarding    Musculoskeletal: Normal range of motion. He exhibits no edema.        Lumbar back: Pain: chronic, rates pain a 5.     Vascular Status -  His right foot exhibits abnormal foot vasculature . His right foot exhibits no edema. His left foot exhibits abnormal foot vasculature . His left foot exhibits no edema.  Lymphadenopathy:     He has no cervical adenopathy.   Neurological: No cranial nerve deficit.   Skin: Skin is warm and dry. Rash noted.   He has some dark pigmented skin with some flaking especially in the gluteal cleft   Psychiatric: He has a normal mood and affect. His behavior is normal. Judgment and thought content normal.   Nursing note and vitals reviewed.      Assessment/Plan   Kuldip was seen today for rash.    Diagnoses and all orders for this visit:    Type 2 diabetes mellitus with complication, unspecified whether long term insulin use (CMS/[facility])  -     CBC & Differential; Future  -     Comprehensive Metabolic Panel  -     Hemoglobin A1c    Morbid obesity (CMS/[facility])    Essential hypertension    Dermatitis    Other orders  -     ketoconazole (NIZORAL) 2 % cream; Apply topically to the appropriate area as directed once Daily.    Certainly I commended him for the diet and exercise efforts and encouraged him to continue these for the weight and for the diabetes but is stressed the importance of taking the diabetic medication.  We will try to get him to return fasting for labs including the A1c and told him that we would talk about his levels and see what we can go  on to treat this as it might make him feel better.  He declines anything today for medication.    I suspect he has a bit of a yeast infection, likely from hyperglycemia.  Recommend Nizoral cream to the area and getting sugars under control and see me if not improving within 3 to 4 weeks.    Blood pressures up a bit today.  Will take his medicine as prescribed when he gets home and let me know if it does not come down.     .      This document has been electronically signed by Aura Carrillo MD on August 14, 2019 9:20 AM

## 2020-01-21 ENCOUNTER — LAB (OUTPATIENT)
Dept: LAB | Facility: OTHER | Age: 45
End: 2020-01-21

## 2020-01-21 DIAGNOSIS — E11.8 TYPE 2 DIABETES MELLITUS WITH COMPLICATION (HCC): ICD-10-CM

## 2020-01-21 DIAGNOSIS — I10 ESSENTIAL HYPERTENSION: ICD-10-CM

## 2020-01-21 LAB
ALBUMIN SERPL-MCNC: 4.5 G/DL (ref 3.5–5)
ALBUMIN/GLOB SERPL: 1.3 G/DL (ref 1.1–1.8)
ALP SERPL-CCNC: 56 U/L (ref 38–126)
ALT SERPL W P-5'-P-CCNC: 21 U/L
ANION GAP SERPL CALCULATED.3IONS-SCNC: 7 MMOL/L (ref 5–15)
AST SERPL-CCNC: 25 U/L (ref 17–59)
BASOPHILS # BLD AUTO: 0.02 10*3/MM3 (ref 0–0.2)
BASOPHILS NFR BLD AUTO: 0.5 % (ref 0–1.5)
BILIRUB SERPL-MCNC: 0.4 MG/DL (ref 0.2–1.3)
BUN BLD-MCNC: 12 MG/DL (ref 7–23)
BUN/CREAT SERPL: 10.1 (ref 7–25)
CALCIUM SPEC-SCNC: 10.4 MG/DL (ref 8.4–10.2)
CHLORIDE SERPL-SCNC: 107 MMOL/L (ref 101–112)
CO2 SERPL-SCNC: 27 MMOL/L (ref 22–30)
CREAT BLD-MCNC: 1.19 MG/DL (ref 0.7–1.3)
DEPRECATED RDW RBC AUTO: 45.1 FL (ref 37–54)
EOSINOPHIL # BLD AUTO: 0.12 10*3/MM3 (ref 0–0.4)
EOSINOPHIL NFR BLD AUTO: 3.3 % (ref 0.3–6.2)
ERYTHROCYTE [DISTWIDTH] IN BLOOD BY AUTOMATED COUNT: 14.6 % (ref 12.3–15.4)
GFR SERPL CREATININE-BSD FRML MDRD: 80 ML/MIN/1.73 (ref 63–147)
GLOBULIN UR ELPH-MCNC: 3.4 GM/DL (ref 2.3–3.5)
GLUCOSE BLD-MCNC: 123 MG/DL (ref 70–99)
HCT VFR BLD AUTO: 38.2 % (ref 37.5–51)
HGB BLD-MCNC: 12.9 G/DL (ref 13–17.7)
LYMPHOCYTES # BLD AUTO: 1.56 10*3/MM3 (ref 0.7–3.1)
LYMPHOCYTES NFR BLD AUTO: 42.3 % (ref 19.6–45.3)
MCH RBC QN AUTO: 29.1 PG (ref 26.6–33)
MCHC RBC AUTO-ENTMCNC: 33.8 G/DL (ref 31.5–35.7)
MCV RBC AUTO: 86 FL (ref 79–97)
MONOCYTES # BLD AUTO: 0.3 10*3/MM3 (ref 0.1–0.9)
MONOCYTES NFR BLD AUTO: 8.1 % (ref 5–12)
NEUTROPHILS # BLD AUTO: 1.69 10*3/MM3 (ref 1.7–7)
NEUTROPHILS NFR BLD AUTO: 45.8 % (ref 42.7–76)
PLATELET # BLD AUTO: 263 10*3/MM3 (ref 140–450)
PMV BLD AUTO: 9.3 FL (ref 6–12)
POTASSIUM BLD-SCNC: 4.1 MMOL/L (ref 3.4–5)
PROT SERPL-MCNC: 7.9 G/DL (ref 6.3–8.6)
RBC # BLD AUTO: 4.44 10*6/MM3 (ref 4.14–5.8)
SODIUM BLD-SCNC: 141 MMOL/L (ref 137–145)
WBC NRBC COR # BLD: 3.69 10*3/MM3 (ref 3.4–10.8)

## 2020-01-21 PROCEDURE — 36415 COLL VENOUS BLD VENIPUNCTURE: CPT | Performed by: FAMILY MEDICINE

## 2020-01-21 PROCEDURE — 80053 COMPREHEN METABOLIC PANEL: CPT | Performed by: FAMILY MEDICINE

## 2020-01-21 PROCEDURE — 83036 HEMOGLOBIN GLYCOSYLATED A1C: CPT | Performed by: FAMILY MEDICINE

## 2020-01-21 PROCEDURE — 85025 COMPLETE CBC W/AUTO DIFF WBC: CPT | Performed by: FAMILY MEDICINE

## 2020-01-22 LAB — HBA1C MFR BLD: 6.8 % (ref 4.8–5.6)

## 2020-08-04 ENCOUNTER — OFFICE VISIT (OUTPATIENT)
Dept: FAMILY MEDICINE CLINIC | Facility: CLINIC | Age: 45
End: 2020-08-04

## 2020-08-04 VITALS — WEIGHT: 303 LBS | BODY MASS INDEX: 41.04 KG/M2 | HEIGHT: 72 IN

## 2020-08-04 DIAGNOSIS — E55.9 VITAMIN D DEFICIENCY: ICD-10-CM

## 2020-08-04 DIAGNOSIS — IMO0002 TYPE II DIABETES MELLITUS WITH COMPLICATION, UNCONTROLLED: ICD-10-CM

## 2020-08-04 DIAGNOSIS — R10.30 LOWER ABDOMINAL PAIN: Primary | ICD-10-CM

## 2020-08-04 DIAGNOSIS — E78.2 MIXED HYPERLIPIDEMIA: ICD-10-CM

## 2020-08-04 DIAGNOSIS — I10 ESSENTIAL HYPERTENSION: ICD-10-CM

## 2020-08-04 DIAGNOSIS — L91.8 SKIN TAG: ICD-10-CM

## 2020-08-04 PROCEDURE — 99214 OFFICE O/P EST MOD 30 MIN: CPT | Performed by: FAMILY MEDICINE

## 2020-08-04 NOTE — PROGRESS NOTES
"Subjective   Kuldip Bossman Castaneda is a 44 y.o. male.   Here in the office today for a few concerns.  He has diabetes, hypertension, hyperlipidemia, vitamin deficiency.  He is due for some labs.  He has not been monitoring his sugars closely recently because it had been doing well and was running fairly stable but this morning is 200.  He does note that he ate some ice cream last night but does not do so on a regular basis.  He tries to workout and follow a diabetic diet.    He has some skin tags in the groin area that had been removed by a general surgeon in the past and have been recurrent and he would like to have a referral to get this done again.    He is concerned about some lower abdominal pain.  Sometimes it is aggravated by movement.  He says that he does have a bowel movement every day.  He has some occasional blood in the stools but has had hemorrhoids.  Last colonoscopy was 4 years ago.  The pain has been getting worse.  He has done a lot of lifting in the past and obviously has risk factors for hernia.    History of Present Illness    The following portions of the patient's history were reviewed and updated as appropriate: allergies, current medications, past family history, past medical history, past social history, past surgical history and problem list.    Review of Systems   HENT: Negative for sneezing and trouble swallowing.    Eyes: Negative for visual disturbance.   Respiratory: Negative for chest tightness and wheezing.    Cardiovascular: Negative for leg swelling.   Gastrointestinal: Positive for abdominal pain.   Genitourinary: Negative for urgency.   Musculoskeletal: Negative for back pain.   Neurological: Negative for dizziness.   Psychiatric/Behavioral: Negative for behavioral problems. The patient is not nervous/anxious.             All other systems reviewed and are negative.      Objective    Vitals:    08/04/20 0807   Weight: (!) 137 kg (303 lb)   Height: 182.9 cm (72\")   PainSc: 0-No pain "     Body mass index is 41.09 kg/m².    Physical Exam   Constitutional: He appears well-developed and well-nourished. He is cooperative. He does not have a sickly appearance. He does not appear ill.   HENT:   Head: Normocephalic and atraumatic.   Nose: Nose normal.   Eyes: Pupils are equal, round, and reactive to light. Conjunctivae and EOM are normal. Right eye exhibits no discharge. Left eye exhibits no discharge. No scleral icterus.   Neck: Trachea normal, normal range of motion and phonation normal. Neck supple. No thyromegaly present.       Cardiovascular: Normal rate, regular rhythm, normal heart sounds and intact distal pulses. Exam reveals no gallop and no friction rub.   No murmur heard.  Pulmonary/Chest: Effort normal and breath sounds normal. No respiratory distress. He has no wheezes. He has no rales.   Abdominal: Soft. Normal appearance and bowel sounds are normal. He exhibits no distension. There is no tenderness. There is no rebound and no guarding.   Mild tenderness to deep palpation left upper and lower quadrant but no masses rebound or guarding.  He does have more focal tenderness in the left groin above the inguinal area but I do not palpate a hernia here.   Musculoskeletal: Normal range of motion. He exhibits no edema.        Lumbar back: Pain: chronic, rates pain a 5.     Vascular Status -  His right foot exhibits abnormal foot vasculature . His right foot exhibits no edema. His left foot exhibits abnormal foot vasculature . His left foot exhibits no edema.  Lymphadenopathy:     He has no cervical adenopathy.   Neurological: No cranial nerve deficit.   Skin: Skin is warm and dry.   Some skin tags are noted in the groin area   Psychiatric: He has a normal mood and affect. His behavior is normal. Judgment and thought content normal.   Nursing note and vitals reviewed.      Assessment/Plan   Kuldip was seen today for skin problem.    Diagnoses and all orders for this visit:    Lower abdominal  pain  -     CT Abdomen Pelvis Without Contrast; Future  -     Ambulatory Referral to General Surgery    Skin tag  -     Ambulatory Referral to General Surgery    Essential hypertension    Type II diabetes mellitus with complication, uncontrolled (CMS/[facility])  -     Hemoglobin A1c  -     Comprehensive Metabolic Panel  -     CBC & Differential; Future    Mixed hyperlipidemia  -     Lipid Panel; Future  -     TSH  -     T4, Free    Vitamin D deficiency  -     Vitamin D 25 Hydroxy    Will get a CT of the abdomen and refer to general surgery to evaluate further for suspected inguinal hernias.    Will refer to general surgery to remove the skin tags as well    Encourage diabetic diet compliance weight loss and exercise and he will return fasting for labs    Continue with current diabetes medications and testing of blood sugars at least daily and prn.  Encourage compliance with diabetic diet.         This document has been electronically signed by Aura Carrillo MD on August 4, 2020 08:33

## 2020-08-05 ENCOUNTER — LAB (OUTPATIENT)
Dept: LAB | Facility: OTHER | Age: 45
End: 2020-08-05

## 2020-08-05 DIAGNOSIS — E78.2 MIXED HYPERLIPIDEMIA: ICD-10-CM

## 2020-08-05 DIAGNOSIS — IMO0002 TYPE II DIABETES MELLITUS WITH COMPLICATION, UNCONTROLLED: ICD-10-CM

## 2020-08-05 LAB
ALBUMIN SERPL-MCNC: 4.4 G/DL (ref 3.5–5)
ALBUMIN/GLOB SERPL: 1.3 G/DL (ref 1.1–1.8)
ALP SERPL-CCNC: 56 U/L (ref 38–126)
ALT SERPL W P-5'-P-CCNC: 20 U/L
ANION GAP SERPL CALCULATED.3IONS-SCNC: 9 MMOL/L (ref 5–15)
AST SERPL-CCNC: 32 U/L (ref 17–59)
BASOPHILS # BLD AUTO: 0.02 10*3/MM3 (ref 0–0.2)
BASOPHILS NFR BLD AUTO: 0.5 % (ref 0–1.5)
BILIRUB SERPL-MCNC: 0.4 MG/DL (ref 0.2–1.3)
BUN SERPL-MCNC: 14 MG/DL (ref 7–23)
BUN/CREAT SERPL: 14.3 (ref 7–25)
CALCIUM SPEC-SCNC: 10.1 MG/DL (ref 8.4–10.2)
CHLORIDE SERPL-SCNC: 103 MMOL/L (ref 101–112)
CHOLEST SERPL-MCNC: 239 MG/DL (ref 150–200)
CO2 SERPL-SCNC: 27 MMOL/L (ref 22–30)
CREAT SERPL-MCNC: 0.98 MG/DL (ref 0.7–1.3)
DEPRECATED RDW RBC AUTO: 45.1 FL (ref 37–54)
EOSINOPHIL # BLD AUTO: 0.13 10*3/MM3 (ref 0–0.4)
EOSINOPHIL NFR BLD AUTO: 3.5 % (ref 0.3–6.2)
ERYTHROCYTE [DISTWIDTH] IN BLOOD BY AUTOMATED COUNT: 14.8 % (ref 12.3–15.4)
GFR SERPL CREATININE-BSD FRML MDRD: 101 ML/MIN/1.73 (ref 63–147)
GLOBULIN UR ELPH-MCNC: 3.5 GM/DL (ref 2.3–3.5)
GLUCOSE SERPL-MCNC: 132 MG/DL (ref 70–99)
HBA1C MFR BLD: 7.6 % (ref 4.8–5.6)
HCT VFR BLD AUTO: 38.1 % (ref 37.5–51)
HDLC SERPL-MCNC: 39 MG/DL (ref 40–59)
HGB BLD-MCNC: 12.9 G/DL (ref 13–17.7)
LDLC SERPL CALC-MCNC: 160 MG/DL
LDLC/HDLC SERPL: 4.1 {RATIO} (ref 0–3.55)
LYMPHOCYTES # BLD AUTO: 1.79 10*3/MM3 (ref 0.7–3.1)
LYMPHOCYTES NFR BLD AUTO: 47.6 % (ref 19.6–45.3)
MCH RBC QN AUTO: 29.1 PG (ref 26.6–33)
MCHC RBC AUTO-ENTMCNC: 33.9 G/DL (ref 31.5–35.7)
MCV RBC AUTO: 85.8 FL (ref 79–97)
MONOCYTES # BLD AUTO: 0.3 10*3/MM3 (ref 0.1–0.9)
MONOCYTES NFR BLD AUTO: 8 % (ref 5–12)
NEUTROPHILS NFR BLD AUTO: 1.52 10*3/MM3 (ref 1.7–7)
NEUTROPHILS NFR BLD AUTO: 40.4 % (ref 42.7–76)
PLATELET # BLD AUTO: 282 10*3/MM3 (ref 140–450)
PMV BLD AUTO: 9.4 FL (ref 6–12)
POTASSIUM SERPL-SCNC: 4.1 MMOL/L (ref 3.4–5)
PROT SERPL-MCNC: 7.9 G/DL (ref 6.3–8.6)
RBC # BLD AUTO: 4.44 10*6/MM3 (ref 4.14–5.8)
SODIUM SERPL-SCNC: 139 MMOL/L (ref 137–145)
TRIGL SERPL-MCNC: 201 MG/DL
VLDLC SERPL-MCNC: 40.2 MG/DL
WBC # BLD AUTO: 3.76 10*3/MM3 (ref 3.4–10.8)

## 2020-08-05 PROCEDURE — 80053 COMPREHEN METABOLIC PANEL: CPT | Performed by: FAMILY MEDICINE

## 2020-08-05 PROCEDURE — 80061 LIPID PANEL: CPT | Performed by: FAMILY MEDICINE

## 2020-08-05 PROCEDURE — 84443 ASSAY THYROID STIM HORMONE: CPT | Performed by: FAMILY MEDICINE

## 2020-08-05 PROCEDURE — 84439 ASSAY OF FREE THYROXINE: CPT | Performed by: FAMILY MEDICINE

## 2020-08-05 PROCEDURE — 36415 COLL VENOUS BLD VENIPUNCTURE: CPT | Performed by: FAMILY MEDICINE

## 2020-08-05 PROCEDURE — 83036 HEMOGLOBIN GLYCOSYLATED A1C: CPT | Performed by: FAMILY MEDICINE

## 2020-08-05 PROCEDURE — 85025 COMPLETE CBC W/AUTO DIFF WBC: CPT | Performed by: FAMILY MEDICINE

## 2020-08-05 PROCEDURE — 82306 VITAMIN D 25 HYDROXY: CPT | Performed by: FAMILY MEDICINE

## 2020-08-05 NOTE — PROGRESS NOTES
Please call the patient regarding his abnormal result.  His cholesterol has jumped up a lot.  Is he still taking his atorvastatin?  If he is we need to double it and if not he needs to get back on it and recheck labs again in 6 months.  Liver and kidney functions okay.  Blood sugar is a little bit on the high side of normal at 132

## 2020-08-06 LAB
25(OH)D3 SERPL-MCNC: 23.5 NG/ML (ref 30–100)
T4 FREE SERPL-MCNC: 1.37 NG/DL (ref 0.93–1.7)
TSH SERPL DL<=0.05 MIU/L-ACNC: 3.18 UIU/ML (ref 0.27–4.2)

## 2020-08-06 NOTE — PROGRESS NOTES
Notify patient Vitamin D is deficient.  Needs vitamin D 50,000 units twice weekly for at least 6 months and recheck.  His A1c is a little higher.  This is the 6-week average of his blood sugar but the glucose was 132 which is really not bad.  To him to keep working on his diet and weight and I think this will improve

## 2020-08-07 ENCOUNTER — TELEPHONE (OUTPATIENT)
Dept: FAMILY MEDICINE CLINIC | Facility: CLINIC | Age: 45
End: 2020-08-07

## 2020-08-07 RX ORDER — ATORVASTATIN CALCIUM 20 MG/1
20 TABLET, FILM COATED ORAL NIGHTLY
Qty: 30 TABLET | Refills: 5 | Status: SHIPPED | OUTPATIENT
Start: 2020-08-07 | End: 2021-03-15 | Stop reason: SDUPTHER

## 2020-08-07 RX ORDER — ERGOCALCIFEROL 1.25 MG/1
50000 CAPSULE ORAL WEEKLY
Qty: 5 CAPSULE | Refills: 5 | Status: SHIPPED | OUTPATIENT
Start: 2020-08-07 | End: 2021-02-06 | Stop reason: HOSPADM

## 2020-12-22 ENCOUNTER — OFFICE VISIT (OUTPATIENT)
Dept: FAMILY MEDICINE CLINIC | Facility: CLINIC | Age: 45
End: 2020-12-22

## 2020-12-22 ENCOUNTER — TELEPHONE (OUTPATIENT)
Dept: FAMILY MEDICINE CLINIC | Facility: CLINIC | Age: 45
End: 2020-12-22

## 2020-12-22 DIAGNOSIS — F41.9 ANXIETY: Primary | ICD-10-CM

## 2020-12-22 PROCEDURE — 99442 PR PHYS/QHP TELEPHONE EVALUATION 11-20 MIN: CPT | Performed by: FAMILY MEDICINE

## 2020-12-22 RX ORDER — ALPRAZOLAM 1 MG/1
1 TABLET ORAL NIGHTLY PRN
Qty: 15 TABLET | Refills: 2 | Status: SHIPPED | OUTPATIENT
Start: 2020-12-22 | End: 2021-02-06 | Stop reason: HOSPADM

## 2020-12-22 NOTE — TELEPHONE ENCOUNTER
Appt scheduled with Jonas Alston at [facility] on 01/15/21 @ 2:00 ( be there by 1:30) bring ID, SS card & insurance card. Will mail appt notice to pt. No vm to leave message.

## 2020-12-22 NOTE — PROGRESS NOTES
Subjective   Kuldipbret Castaneda is a 45 y.o. male.   This visit has been rescheduled as a phone visit to comply with patient safety concerns in accordance with [facility] recommendations. Total time of discussion was 15 minutes.    You have chosen to receive care through a telephone visit. Do you consent to use a telephone visit for your medical care today? Yes    Patient having a lot of anxiety, discusses a lot of challenges and obstacle in his life right now.  He says his wife has mentioned that maybe he should talk to a counselor.    He has taken olanzapine recently and he did not like the side effects.  He says the alprazolam has helped in the past and he would like some to have to use in the event of extreme stress.      History of Present Illness    The following portions of the patient's history were reviewed and updated as appropriate: allergies, current medications, past family history, past medical history, past social history, past surgical history and problem list.    Review of Systems   HENT: Negative for sneezing and trouble swallowing.    Eyes: Negative for visual disturbance.   Respiratory: Negative for chest tightness and wheezing.    Cardiovascular: Negative for leg swelling.   Gastrointestinal: Positive for abdominal pain.   Genitourinary: Negative for urgency.   Musculoskeletal: Negative for back pain.   Neurological: Negative for dizziness.   Psychiatric/Behavioral: Negative for behavioral problems. The patient is not nervous/anxious.             All other systems reviewed and are negative.      Objective    There were no vitals filed for this visit.  There is no height or weight on file to calculate BMI.    Assessment/Plan   Diagnoses and all orders for this visit:    1. Anxiety (Primary)  -     ALPRAZolam (XANAX) 1 MG tablet; Take 1 tablet by mouth At Night As Needed for Anxiety. **May take up to 15 tablets per month**  Dispense: 15 tablet; Refill: 2    The patient has read and signed the [facility]  Health Controlled Substance Contract.  I will continue to see patient for regular follow up appointments. Patient is well controlled on the medication.  MARY has been reviewed by me and is updated every 3 months. The patient is aware of the potential for addiction and dependence.   Patient requests referral to counseling.  We will try to see what we can do to get him in.  He specifically asked for counseling for both him and his wife as he would like for her to be there to help discuss some issues he is having.  We will send in Xanax in a limited amount for him to take only in the event of a panic attack and he understands that it is addictive and he should take only if needed, no more than 15/month.  Ideally would like to get him on a more long-acting medication but we have tried several in the past and it has been very difficult to find a daily long-acting medication that he has been able to tolerate that worked for him.  We will try the counseling route and follow-up with me afterwards when needed.        This document has been electronically signed by Aura Carrillo MD on December 22, 2020 11:10 CST

## 2021-01-28 ENCOUNTER — OFFICE VISIT (OUTPATIENT)
Dept: FAMILY MEDICINE CLINIC | Facility: CLINIC | Age: 46
End: 2021-01-28

## 2021-01-28 VITALS — HEIGHT: 72 IN | WEIGHT: 303 LBS | BODY MASS INDEX: 41.04 KG/M2

## 2021-01-28 DIAGNOSIS — E66.01 OBESITY, CLASS III, BMI 40-49.9 (MORBID OBESITY) (HCC): ICD-10-CM

## 2021-01-28 DIAGNOSIS — R10.9 ABDOMINAL WALL PAIN: Primary | ICD-10-CM

## 2021-01-28 PROCEDURE — 99442 PR PHYS/QHP TELEPHONE EVALUATION 11-20 MIN: CPT | Performed by: FAMILY MEDICINE

## 2021-01-28 NOTE — PROGRESS NOTES
"Subjective   Kuldip Bossman Castaneda is a 45 y.o. male.   This visit has been rescheduled as a phone visit to comply with patient safety concerns in accordance with CDC recommendations. Total time of discussion was 15 minutes.    You have chosen to receive care through a telephone visit. Do you consent to use a telephone visit for your medical care today? Yes    Patient having what he calls \"issues\" in his abdomen.  He is worried he may have lipomas or hernias.  He describes a discomfort in the abdomen and cannot seem to lose weight.  He says his abdomen just feels \"tight.\"  He has had recurrent concerns for several years now about abnormalities in the abdomen and in the past is described fears of having a foreign body or other issues.  We have done work-up including CT scans colonoscopy and EGD which have not revealed any source.  He says he worries about an intestinal blockage although his bowel movements are regular and he has noted no changes.  I did review the CT with him done last August and reassured that it showed no blockages foreign bodies or other abnormalities except for a stable-appearing cyst in the kidney    Of note is that the patient has been diagnosed with diabetes, hypertension, hypothyroidism, and has been hospitalized with \"psychosis\" but has stopped taking medicines for most of these issues and does not monitor blood sugars regularly.    History of Present Illness    The following portions of the patient's history were reviewed and updated as appropriate: allergies, current medications, past family history, past medical history, past social history, past surgical history and problem list.    Review of Systems   HENT: Negative for sneezing and trouble swallowing.    Eyes: Negative for visual disturbance.   Respiratory: Negative for chest tightness and wheezing.    Cardiovascular: Negative for leg swelling.   Genitourinary: Negative for urgency.   Musculoskeletal: Negative for back pain.   "   Neurological: Negative for dizziness.   Psychiatric/Behavioral: Negative for behavioral problems. The patient is not nervous/anxious.             All other systems reviewed and are negative.      Objective    Vitals:    01/28/21 0909   PainSc: 0-No pain     There is no height or weight on file to calculate BMI.    Assessment/Plan   Diagnoses and all orders for this visit:    1. Abdominal wall pain (Primary)    2. Obesity, Class III, BMI 40-49.9 (morbid obesity) (CMS/[facility])    Will make referral to general surgery.  Spoke with the surgeon and made him aware of the patient's concerns and desire for a physical examination of the abdomen.  We will go ahead and order an ultrasound as well.    Patient's Body mass index is 41.09 kg/m². BMI is above normal parameters. Recommendations include: exercise counseling and nutrition counseling.        This document has been electronically signed by Aura Carrillo MD on January 28, 2021 10:27 CST

## 2021-02-02 ENCOUNTER — HOSPITAL ENCOUNTER (OUTPATIENT)
Facility: HOSPITAL | Age: 46
Setting detail: OBSERVATION
End: 2021-02-03
Attending: STUDENT IN AN ORGANIZED HEALTH CARE EDUCATION/TRAINING PROGRAM | Admitting: HOSPITALIST

## 2021-02-02 ENCOUNTER — APPOINTMENT (OUTPATIENT)
Dept: GENERAL RADIOLOGY | Facility: HOSPITAL | Age: 46
End: 2021-02-02

## 2021-02-02 DIAGNOSIS — I10 HYPERTENSION, UNSPECIFIED TYPE: ICD-10-CM

## 2021-02-02 DIAGNOSIS — F29 PSYCHOSIS, UNSPECIFIED PSYCHOSIS TYPE (HCC): Primary | ICD-10-CM

## 2021-02-02 DIAGNOSIS — R77.8 ELEVATED TROPONIN: ICD-10-CM

## 2021-02-02 PROBLEM — I16.0 HYPERTENSIVE URGENCY: Status: ACTIVE | Noted: 2021-02-02

## 2021-02-02 LAB
ALBUMIN SERPL-MCNC: 4.5 G/DL (ref 3.5–5.2)
ALBUMIN/GLOB SERPL: 1.3 G/DL
ALP SERPL-CCNC: 62 U/L (ref 39–117)
ALT SERPL W P-5'-P-CCNC: 12 U/L (ref 1–41)
AMPHET+METHAMPHET UR QL: NEGATIVE
AMPHETAMINES UR QL: NEGATIVE
ANION GAP SERPL CALCULATED.3IONS-SCNC: 12 MMOL/L (ref 5–15)
APAP SERPL-MCNC: <5 MCG/ML (ref 0–30)
AST SERPL-CCNC: 13 U/L (ref 1–40)
BARBITURATES UR QL SCN: NEGATIVE
BASOPHILS # BLD AUTO: 0.04 10*3/MM3 (ref 0–0.2)
BASOPHILS NFR BLD AUTO: 0.7 % (ref 0–1.5)
BENZODIAZ UR QL SCN: POSITIVE
BILIRUB SERPL-MCNC: 0.4 MG/DL (ref 0–1.2)
BUN SERPL-MCNC: 10 MG/DL (ref 6–20)
BUN/CREAT SERPL: 8.8 (ref 7–25)
BUPRENORPHINE SERPL-MCNC: NEGATIVE NG/ML
CALCIUM SPEC-SCNC: 10 MG/DL (ref 8.6–10.5)
CANNABINOIDS SERPL QL: POSITIVE
CHLORIDE SERPL-SCNC: 98 MMOL/L (ref 98–107)
CO2 SERPL-SCNC: 27 MMOL/L (ref 22–29)
COCAINE UR QL: NEGATIVE
CREAT SERPL-MCNC: 1.14 MG/DL (ref 0.76–1.27)
DEPRECATED RDW RBC AUTO: 40.8 FL (ref 37–54)
EOSINOPHIL # BLD AUTO: 0.04 10*3/MM3 (ref 0–0.4)
EOSINOPHIL NFR BLD AUTO: 0.7 % (ref 0.3–6.2)
ERYTHROCYTE [DISTWIDTH] IN BLOOD BY AUTOMATED COUNT: 14.1 % (ref 12.3–15.4)
ETHANOL BLD-MCNC: <10 MG/DL (ref 0–10)
ETHANOL UR QL: <0.01 %
FLUAV RNA RESP QL NAA+PROBE: NOT DETECTED
FLUBV RNA RESP QL NAA+PROBE: NOT DETECTED
GFR SERPL CREATININE-BSD FRML MDRD: 84 ML/MIN/1.73
GLOBULIN UR ELPH-MCNC: 3.4 GM/DL
GLUCOSE SERPL-MCNC: 169 MG/DL (ref 65–99)
HCT VFR BLD AUTO: 38.8 % (ref 37.5–51)
HGB BLD-MCNC: 14 G/DL (ref 13–17.7)
HOLD SPECIMEN: NORMAL
HOLD SPECIMEN: NORMAL
IMM GRANULOCYTES # BLD AUTO: 0.01 10*3/MM3 (ref 0–0.05)
IMM GRANULOCYTES NFR BLD AUTO: 0.2 % (ref 0–0.5)
LYMPHOCYTES # BLD AUTO: 1.55 10*3/MM3 (ref 0.7–3.1)
LYMPHOCYTES NFR BLD AUTO: 27.8 % (ref 19.6–45.3)
MCH RBC QN AUTO: 29.4 PG (ref 26.6–33)
MCHC RBC AUTO-ENTMCNC: 36.1 G/DL (ref 31.5–35.7)
MCV RBC AUTO: 81.3 FL (ref 79–97)
METHADONE UR QL SCN: NEGATIVE
MONOCYTES # BLD AUTO: 0.43 10*3/MM3 (ref 0.1–0.9)
MONOCYTES NFR BLD AUTO: 7.7 % (ref 5–12)
NEUTROPHILS NFR BLD AUTO: 3.51 10*3/MM3 (ref 1.7–7)
NEUTROPHILS NFR BLD AUTO: 62.9 % (ref 42.7–76)
NRBC BLD AUTO-RTO: 0 /100 WBC (ref 0–0.2)
NT-PROBNP SERPL-MCNC: 248 PG/ML (ref 0–450)
OPIATES UR QL: NEGATIVE
OXYCODONE UR QL SCN: NEGATIVE
PCP UR QL SCN: NEGATIVE
PLATELET # BLD AUTO: 311 10*3/MM3 (ref 140–450)
PMV BLD AUTO: 9.4 FL (ref 6–12)
POTASSIUM SERPL-SCNC: 3.1 MMOL/L (ref 3.5–5.2)
PROPOXYPH UR QL: NEGATIVE
PROT SERPL-MCNC: 7.9 G/DL (ref 6–8.5)
RBC # BLD AUTO: 4.77 10*6/MM3 (ref 4.14–5.8)
SALICYLATES SERPL-MCNC: <0.3 MG/DL
SARS-COV-2 RNA RESP QL NAA+PROBE: NOT DETECTED
SODIUM SERPL-SCNC: 137 MMOL/L (ref 136–145)
T4 FREE SERPL-MCNC: 1.76 NG/DL (ref 0.93–1.7)
TRICYCLICS UR QL SCN: NEGATIVE
TROPONIN T SERPL-MCNC: 0.03 NG/ML (ref 0–0.03)
TSH SERPL DL<=0.05 MIU/L-ACNC: 3.05 UIU/ML (ref 0.27–4.2)
WBC # BLD AUTO: 5.58 10*3/MM3 (ref 3.4–10.8)
WHOLE BLOOD HOLD SPECIMEN: NORMAL
WHOLE BLOOD HOLD SPECIMEN: NORMAL

## 2021-02-02 PROCEDURE — G0378 HOSPITAL OBSERVATION PER HR: HCPCS

## 2021-02-02 PROCEDURE — 84484 ASSAY OF TROPONIN QUANT: CPT | Performed by: HOSPITALIST

## 2021-02-02 PROCEDURE — 80143 DRUG ASSAY ACETAMINOPHEN: CPT | Performed by: PHYSICIAN ASSISTANT

## 2021-02-02 PROCEDURE — 84484 ASSAY OF TROPONIN QUANT: CPT | Performed by: PHYSICIAN ASSISTANT

## 2021-02-02 PROCEDURE — 96376 TX/PRO/DX INJ SAME DRUG ADON: CPT

## 2021-02-02 PROCEDURE — 93005 ELECTROCARDIOGRAM TRACING: CPT | Performed by: PHYSICIAN ASSISTANT

## 2021-02-02 PROCEDURE — 84439 ASSAY OF FREE THYROXINE: CPT | Performed by: PHYSICIAN ASSISTANT

## 2021-02-02 PROCEDURE — 96374 THER/PROPH/DIAG INJ IV PUSH: CPT

## 2021-02-02 PROCEDURE — 71045 X-RAY EXAM CHEST 1 VIEW: CPT

## 2021-02-02 PROCEDURE — 93010 ELECTROCARDIOGRAM REPORT: CPT | Performed by: INTERNAL MEDICINE

## 2021-02-02 PROCEDURE — C9803 HOPD COVID-19 SPEC COLLECT: HCPCS

## 2021-02-02 PROCEDURE — 80053 COMPREHEN METABOLIC PANEL: CPT | Performed by: PHYSICIAN ASSISTANT

## 2021-02-02 PROCEDURE — 96375 TX/PRO/DX INJ NEW DRUG ADDON: CPT

## 2021-02-02 PROCEDURE — 87636 SARSCOV2 & INF A&B AMP PRB: CPT | Performed by: PHYSICIAN ASSISTANT

## 2021-02-02 PROCEDURE — 80306 DRUG TEST PRSMV INSTRMNT: CPT | Performed by: PHYSICIAN ASSISTANT

## 2021-02-02 PROCEDURE — 85025 COMPLETE CBC W/AUTO DIFF WBC: CPT | Performed by: PHYSICIAN ASSISTANT

## 2021-02-02 PROCEDURE — 80179 DRUG ASSAY SALICYLATE: CPT | Performed by: PHYSICIAN ASSISTANT

## 2021-02-02 PROCEDURE — 99285 EMERGENCY DEPT VISIT HI MDM: CPT

## 2021-02-02 PROCEDURE — 84443 ASSAY THYROID STIM HORMONE: CPT | Performed by: PHYSICIAN ASSISTANT

## 2021-02-02 PROCEDURE — 25010000002 HYDRALAZINE PER 20 MG: Performed by: HOSPITALIST

## 2021-02-02 PROCEDURE — 82962 GLUCOSE BLOOD TEST: CPT

## 2021-02-02 PROCEDURE — 82077 ASSAY SPEC XCP UR&BREATH IA: CPT | Performed by: PHYSICIAN ASSISTANT

## 2021-02-02 PROCEDURE — 83880 ASSAY OF NATRIURETIC PEPTIDE: CPT | Performed by: PHYSICIAN ASSISTANT

## 2021-02-02 PROCEDURE — 25010000002 HYDRALAZINE PER 20 MG: Performed by: INTERNAL MEDICINE

## 2021-02-02 RX ORDER — HYDRALAZINE HYDROCHLORIDE 20 MG/ML
10 INJECTION INTRAMUSCULAR; INTRAVENOUS EVERY 6 HOURS PRN
Status: DISCONTINUED | OUTPATIENT
Start: 2021-02-02 | End: 2021-02-02

## 2021-02-02 RX ORDER — ONDANSETRON 2 MG/ML
4 INJECTION INTRAMUSCULAR; INTRAVENOUS EVERY 6 HOURS PRN
Status: DISCONTINUED | OUTPATIENT
Start: 2021-02-02 | End: 2021-02-03 | Stop reason: HOSPADM

## 2021-02-02 RX ORDER — HYDRALAZINE HYDROCHLORIDE 20 MG/ML
20 INJECTION INTRAMUSCULAR; INTRAVENOUS ONCE
Status: COMPLETED | OUTPATIENT
Start: 2021-02-02 | End: 2021-02-02

## 2021-02-02 RX ORDER — SODIUM CHLORIDE 0.9 % (FLUSH) 0.9 %
10 SYRINGE (ML) INJECTION AS NEEDED
Status: DISCONTINUED | OUTPATIENT
Start: 2021-02-02 | End: 2021-02-03 | Stop reason: HOSPADM

## 2021-02-02 RX ORDER — ONDANSETRON 4 MG/1
4 TABLET, FILM COATED ORAL EVERY 6 HOURS PRN
Status: DISCONTINUED | OUTPATIENT
Start: 2021-02-02 | End: 2021-02-03 | Stop reason: HOSPADM

## 2021-02-02 RX ORDER — ENALAPRIL MALEATE 10 MG/1
10 TABLET ORAL DAILY
Status: DISCONTINUED | OUTPATIENT
Start: 2021-02-03 | End: 2021-02-02

## 2021-02-02 RX ORDER — LABETALOL 200 MG/1
200 TABLET, FILM COATED ORAL EVERY 8 HOURS SCHEDULED
Status: DISCONTINUED | OUTPATIENT
Start: 2021-02-02 | End: 2021-02-03 | Stop reason: HOSPADM

## 2021-02-02 RX ORDER — ENALAPRIL MALEATE 10 MG/1
20 TABLET ORAL
Status: DISCONTINUED | OUTPATIENT
Start: 2021-02-02 | End: 2021-02-03 | Stop reason: HOSPADM

## 2021-02-02 RX ORDER — ATORVASTATIN CALCIUM 20 MG/1
20 TABLET, FILM COATED ORAL NIGHTLY
Status: DISCONTINUED | OUTPATIENT
Start: 2021-02-02 | End: 2021-02-03 | Stop reason: HOSPADM

## 2021-02-02 RX ORDER — HYDRALAZINE HYDROCHLORIDE 20 MG/ML
20 INJECTION INTRAMUSCULAR; INTRAVENOUS EVERY 6 HOURS PRN
Status: DISCONTINUED | OUTPATIENT
Start: 2021-02-02 | End: 2021-02-03 | Stop reason: HOSPADM

## 2021-02-02 RX ORDER — ENALAPRILAT 2.5 MG/2ML
2.5 INJECTION INTRAVENOUS ONCE
Status: COMPLETED | OUTPATIENT
Start: 2021-02-02 | End: 2021-02-02

## 2021-02-02 RX ORDER — NICOTINE POLACRILEX 4 MG
15 LOZENGE BUCCAL
Status: DISCONTINUED | OUTPATIENT
Start: 2021-02-02 | End: 2021-02-03 | Stop reason: HOSPADM

## 2021-02-02 RX ORDER — ALPRAZOLAM 1 MG/1
1 TABLET ORAL NIGHTLY PRN
Status: DISCONTINUED | OUTPATIENT
Start: 2021-02-02 | End: 2021-02-03 | Stop reason: HOSPADM

## 2021-02-02 RX ORDER — LABETALOL HYDROCHLORIDE 5 MG/ML
20 INJECTION, SOLUTION INTRAVENOUS ONCE
Status: COMPLETED | OUTPATIENT
Start: 2021-02-02 | End: 2021-02-02

## 2021-02-02 RX ORDER — DEXTROSE MONOHYDRATE 25 G/50ML
25 INJECTION, SOLUTION INTRAVENOUS
Status: DISCONTINUED | OUTPATIENT
Start: 2021-02-02 | End: 2021-02-03 | Stop reason: HOSPADM

## 2021-02-02 RX ORDER — SODIUM CHLORIDE 0.9 % (FLUSH) 0.9 %
10 SYRINGE (ML) INJECTION EVERY 12 HOURS SCHEDULED
Status: DISCONTINUED | OUTPATIENT
Start: 2021-02-02 | End: 2021-02-03 | Stop reason: HOSPADM

## 2021-02-02 RX ADMIN — Medication 10 ML: at 23:16

## 2021-02-02 RX ADMIN — HYDRALAZINE HYDROCHLORIDE 20 MG: 20 INJECTION INTRAMUSCULAR; INTRAVENOUS at 21:55

## 2021-02-02 RX ADMIN — ENALAPRIL MALEATE 20 MG: 10 TABLET ORAL at 19:31

## 2021-02-02 RX ADMIN — HYDRALAZINE HYDROCHLORIDE 10 MG: 20 INJECTION INTRAMUSCULAR; INTRAVENOUS at 16:08

## 2021-02-02 RX ADMIN — LABETALOL HYDROCHLORIDE 200 MG: 200 TABLET, FILM COATED ORAL at 21:54

## 2021-02-02 RX ADMIN — ATORVASTATIN CALCIUM 20 MG: 20 TABLET, FILM COATED ORAL at 23:16

## 2021-02-02 RX ADMIN — ENALAPRILAT 2.5 MG: 1.25 INJECTION INTRAVENOUS at 15:02

## 2021-02-02 RX ADMIN — LABETALOL HYDROCHLORIDE 20 MG: 5 INJECTION, SOLUTION INTRAVENOUS at 12:52

## 2021-02-02 NOTE — H&P
[facility] Medicine Admission      Date of Admission: 2/2/2021      Primary Care Physician: Aura Carrillo MD      Chief Complaint: Paranoid, hypertension    HPI: 45-year-old with a history of paranoia was recommended to come to the clinic for psych evaluation and hypertension treatment.  Patient's wife is at bedside who endorses that patient has been paranoid for about 2 weeks.  He has been accusing his wife of cheating and spending time with 5-6 different men.  Patient says God tells him different things.  Patient was on blood pressure medication at some point and he says the exercise improved his blood pressure and that he has not been taking his blood pressure recently.  Denies any chest pain, shortness of breath, abdominal pain, headache, focal weakness or any other concerning symptoms.  Patient was put on 72 hour hold and admitted for further evaluation.    Concurrent Medical History:  has a past medical history of Anxiety, Biliary dyskinesia, Blood in feces, Chest pain, Chronic cholecystitis without calculus, Depressive disorder, Epigastric pain, Essential hypertension, Gastro-esophageal reflux disease with esophagitis, Generalized anxiety disorder, Genital warts, Glaucoma suspect, Hashimoto's thyroiditis, Hematochezia, History of echocardiogram (01/15/2016), Hypercholesterolemia, Hyperlipemia, Hypertensive disorder, Lipoma of anterior chest wall, Major depressive disorder, Microscopic hematuria, Morbid obesity (CMS/HCC), Multiple acquired skin tags, Nausea and vomiting, Obesity, Pain in pelvis, Painful urging to urinate, Panic disorder, Psychiatric illness, Psychosis (CMS/HCC), Right upper quadrant pain, Schizoaffective disorder (CMS/HCC), Transient visual loss, Type 2 diabetes mellitus (CMS/HCC), Visual disturbance, and Vitamin D deficiency.    Past Surgical History:  has a past surgical history that includes ORIF ankle fracture (03/31/2016); Cardiac  catheterization (01/20/2016); Laparoscopic cholecystectomy (01/26/2015); Lumbar disc surgery (2012); Lipoma Excision (07/06/2015); Injection of Medication (01/12/2016); Esophagogastroduodenoscopy (N/A, 5/24/2017); Colonoscopy (09/27/2016); Upper gastrointestinal endoscopy (05/24/2017); and Cardiac catheterization (N/A, 7/14/2017).    Family History: family history includes Depression in his mother; Heart attack in his father; Schizophrenia in his father.     Social History:  reports that he has quit smoking. He has never used smokeless tobacco. He reports that he does not drink alcohol or use drugs.    Allergies: No Known Allergies    Medications:   Prior to Admission medications    Medication Sig Start Date End Date Taking? Authorizing Provider   ALPRAZolam (XANAX) 1 MG tablet Take 1 tablet by mouth At Night As Needed for Anxiety. **May take up to 15 tablets per month** 12/22/20   Aura Carrillo MD   atorvastatin (LIPITOR) 20 MG tablet Take 1 tablet by mouth Every Night. 8/7/20   Aura Carrillo MD   enalapril (VASOTEC) 10 MG tablet Take 1 tablet by mouth Daily. 2/26/19   Rafal Vázquez MD   vitamin D (ERGOCALCIFEROL) 1.25 MG (14499 UT) capsule capsule Take 1 capsule by mouth 1 (One) Time Per Week. 8/7/20   Aura Carrillo MD       Review of Systems:  Review of Systems   Constitutional: Negative for fever.   HENT: Negative for ear pain.    Eyes: Negative for pain.   Respiratory: Negative for shortness of breath and wheezing.    Cardiovascular: Negative for chest pain.   Gastrointestinal: Negative for abdominal pain.   Genitourinary: Negative for dysuria.   Musculoskeletal: Negative for neck pain.   Skin: Negative for rash.   Neurological: Negative for headaches.   Psychiatric/Behavioral: Positive for hallucinations and sleep disturbance. Negative for agitation.      Otherwise complete ROS is negative except as mentioned above.    Physical Exam:   Temp:  [97.3 °F (36.3 °C)] 97.3 °F (36.3 °C)  Heart  Rate:  [66-83] 70  Resp:  [20] 20  BP: (179-217)/(113-137) 185/118  Physical Exam  Constitutional:       Appearance: He is well-developed.   HENT:      Head: Normocephalic and atraumatic.   Eyes:      Pupils: Pupils are equal, round, and reactive to light.   Neck:      Musculoskeletal: Normal range of motion.   Cardiovascular:      Rate and Rhythm: Normal rate.   Pulmonary:      Breath sounds: Decreased breath sounds present. No wheezing.   Abdominal:      Palpations: Abdomen is soft.      Tenderness: There is no abdominal tenderness.   Skin:     General: Skin is warm and dry.   Neurological:      Mental Status: He is alert and oriented to person, place, and time.   Psychiatric:         Mood and Affect: Mood normal.         Behavior: Behavior normal.           Results Reviewed:  I have personally reviewed current lab, radiology, and data and agree with results.  Lab Results (last 24 hours)     Procedure Component Value Units Date/Time    Troponin [[id number]]  (Normal) Collected: 02/02/21 1419    Specimen: Blood Updated: 02/02/21 1440     Troponin T 0.030 ng/mL     Narrative:      Troponin T Reference Range:  <= 0.03 ng/mL-   Negative for AMI  >0.03 ng/mL-     Abnormal for myocardial necrosis.  Clinicians would have to utilize clinical acumen, EKG, Troponin and serial changes to determine if it is an Acute Myocardial Infarction or myocardial injury due to an underlying chronic condition.       Results may be falsely decreased if patient taking Biotin.      COVID-19 and FLU A/B PCR - Swab, Nasopharynx [[id number]]  (Normal) Collected: 02/02/21 1238    Specimen: Swab from Nasopharynx Updated: 02/02/21 1425     COVID19 Not Detected     Influenza A PCR Not Detected     Influenza B PCR Not Detected    Narrative:      Fact sheet for providers: https://www.fda.gov/media/848526/download    Fact sheet for patients: https://www.fda.gov/media/797755/download    Test performed by PCR.    [place] Draw [[id number]] Collected:  02/02/21 1223    Specimen: Blood Updated: 02/02/21 1331    Narrative:      The following orders were created for panel order [facility] Draw.  Procedure                               Abnormality         Status                     ---------                               -----------         ------                     Light Blue Top[[id number]]                                   Final result               Green Top (Gel)[[id number]]                                  Final result               Lavender Top[[id number]]                                     Final result               Gold Top - SST[[id number]]                                   Final result                 Please view results for these tests on the individual orders.    Green Top (Gel) [[id number]] Collected: 02/02/21 1223    Specimen: Blood Updated: 02/02/21 1331     Extra Tube Hold for add-ons.     Comment: Auto resulted.       Light Blue Top [[id number]] Collected: 02/02/21 1223    Specimen: Blood Updated: 02/02/21 1330     Extra Tube hold for add-on     Comment: Auto resulted       Lavender Top [[id number]] Collected: 02/02/21 1223    Specimen: Blood Updated: 02/02/21 1330     Extra Tube hold for add-on     Comment: Auto resulted       Gold Top - SST [[id number]] Collected: 02/02/21 1223    Specimen: Blood Updated: 02/02/21 1330     Extra Tube Hold for add-ons.     Comment: Auto resulted.       Troponin [[id number]]  (Abnormal) Collected: 02/02/21 1223    Specimen: Blood Updated: 02/02/21 1312     Troponin T 0.032 ng/mL     Narrative:      Troponin T Reference Range:  <= 0.03 ng/mL-   Negative for AMI  >0.03 ng/mL-     Abnormal for myocardial necrosis.  Clinicians would have to utilize clinical acumen, EKG, Troponin and serial changes to determine if it is an Acute Myocardial Infarction or myocardial injury due to an underlying chronic condition.       Results may be falsely decreased if patient taking Biotin.      TSH+Free T4 [[id number]]  (Abnormal) Collected:  02/02/21 1223    Specimen: Blood Updated: 02/02/21 1308     TSH 3.050 uIU/mL      Free T4 1.76 ng/dL      Comment: T4 results may be falsely increased if patient taking Biotin.       BNP [[id number]]  (Normal) Collected: 02/02/21 1223    Specimen: Blood Updated: 02/02/21 1308     proBNP 248.0 pg/mL     Narrative:      Among patients with dyspnea, NT-proBNP is highly sensitive for the detection of acute congestive heart failure. In addition NT-proBNP of <300 pg/ml effectively rules out acute congestive heart failure with 99% negative predictive value.    Results may be falsely decreased if patient taking Biotin.      Comprehensive Metabolic Panel [[id number]]  (Abnormal) Collected: 02/02/21 1223    Specimen: Blood Updated: 02/02/21 1303     Glucose 169 mg/dL      BUN 10 mg/dL      Creatinine 1.14 mg/dL      Sodium 137 mmol/L      Potassium 3.1 mmol/L      Chloride 98 mmol/L      CO2 27.0 mmol/L      Calcium 10.0 mg/dL      Total Protein 7.9 g/dL      Albumin 4.50 g/dL      ALT (SGPT) 12 U/L      AST (SGOT) 13 U/L      Alkaline Phosphatase 62 U/L      Total Bilirubin 0.4 mg/dL      eGFR  African Amer 84 mL/min/1.73      Globulin 3.4 gm/dL      A/G Ratio 1.3 g/dL      BUN/Creatinine Ratio 8.8     Anion Gap 12.0 mmol/L     Narrative:      GFR Normal >60  Chronic Kidney Disease <60  Kidney Failure <15      Acetaminophen Level [[id number]]  (Normal) Collected: 02/02/21 1223    Specimen: Blood Updated: 02/02/21 1303     Acetaminophen <5.0 mcg/mL     Ethanol [[id number]] Collected: 02/02/21 1223    Specimen: Blood Updated: 02/02/21 1303     Ethanol <10 mg/dL      Ethanol % <0.010 %     Salicylate Level [[id number]]  (Normal) Collected: 02/02/21 1223    Specimen: Blood Updated: 02/02/21 1303     Salicylate <0.3 mg/dL     CBC & Differential [[id number]]  (Abnormal) Collected: 02/02/21 1223    Specimen: Blood Updated: 02/02/21 1236    Narrative:      The following orders were created for panel order CBC & Differential.  Procedure                                Abnormality         Status                     ---------                               -----------         ------                     CBC Auto Differential[[id number]]        Abnormal            Final result                 Please view results for these tests on the individual orders.    CBC Auto Differential [[id number]]  (Abnormal) Collected: 02/02/21 1223    Specimen: Blood Updated: 02/02/21 1236     WBC 5.58 10*3/mm3      RBC 4.77 10*6/mm3      Hemoglobin 14.0 g/dL      Hematocrit 38.8 %      MCV 81.3 fL      MCH 29.4 pg      MCHC 36.1 g/dL      RDW 14.1 %      RDW-SD 40.8 fl      MPV 9.4 fL      Platelets 311 10*3/mm3      Neutrophil % 62.9 %      Lymphocyte % 27.8 %      Monocyte % 7.7 %      Eosinophil % 0.7 %      Basophil % 0.7 %      Immature Grans % 0.2 %      Neutrophils, Absolute 3.51 10*3/mm3      Lymphocytes, Absolute 1.55 10*3/mm3      Monocytes, Absolute 0.43 10*3/mm3      Eosinophils, Absolute 0.04 10*3/mm3      Basophils, Absolute 0.04 10*3/mm3      Immature Grans, Absolute 0.01 10*3/mm3      nRBC 0.0 /100 WBC         Imaging Results (Last 24 Hours)     Procedure Component Value Units Date/Time    XR Chest 1 View [[id number]] Collected: 02/02/21 1217     Updated: 02/02/21 1239    Narrative:        PROCEDURE: Single chest view portable    REASON FOR EXAM:HTN    FINDINGS: Comparison exam dated November 7, 2018. Cardiac and  pulmonary vasculature are normal. Lungs are clear. Pleural spaces  are normal. No acute osseous abnormality.      Impression:      Negative single view chest     Electronically signed by:  Solis Hernández MD  2/2/2021 12:38 PM CST  Workstation: [id number]            Assessment:    Active Hospital Problems    Diagnosis   • **Hypertensive urgency   • Psychosis (CMS/HCC)             Plan:  1.  Elevated troponin: New, mild.  Initial work-up unremarkable.  Trend troponins.  Echo/stress test ordered -discussed the benefits and risks including MI, death with the  patient and patient is okay with taking the stress test.  No chest pain;.  Likely from uncontrolled blood pressure.  Monitor  2.  Hypertensive urgency: New.  Improved with medications in the ER.  We will try to decrease the blood pressure slowly.  Start the patient back on his home medications tomorrow.  As needed medications ordered.  Patient has been refusing Cardene drip/any IV meds as his blood pressure went up again.  After explaining to him the risks of uncontrolled blood pressure, he agreed to take Vasotec p.o. first and if that does not work he is okay with taking IV medications.  Monitor  3.  Paranoia: Worse.  On 72-hour hold.  Consult psychiatry when cleared clinically.  4.  DM: SSI for now    I discussed the patient's findings and my recommendations with: pt and his wife    Signed     Dr Aj Kaur, DO    2/2/2021  15:20 CST

## 2021-02-02 NOTE — ED NOTES
Called pharmacy again to dispense enalapril, will wait for medication.     Noemi Luis, RN  02/02/21 7080
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
## 2021-02-02 NOTE — ED NOTES
Pt and wife had words, wife crying and upset, left to go check on kids.      Noemi Luis, RN  02/02/21 [id number]

## 2021-02-02 NOTE — ED NOTES
Pt was seen today at his therapist and was referred to the ED for a psyche eval as well as hypertension. Wife reports that pt has not slept In 5 days, has been paranoid and delusional. Pt is currently appropriate and calm. Pt placed on tele, labs drawn, ekg completed.      Noemi Luis, RN  02/02/21 [id number]

## 2021-02-03 ENCOUNTER — APPOINTMENT (OUTPATIENT)
Dept: CARDIOLOGY | Facility: HOSPITAL | Age: 46
End: 2021-02-03

## 2021-02-03 ENCOUNTER — HOSPITAL ENCOUNTER (INPATIENT)
Facility: HOSPITAL | Age: 46
End: 2021-02-03
Attending: PSYCHIATRY & NEUROLOGY | Admitting: PSYCHIATRY & NEUROLOGY

## 2021-02-03 ENCOUNTER — HOSPITAL ENCOUNTER (INPATIENT)
Facility: HOSPITAL | Age: 46
LOS: 3 days | Discharge: PSYCHIATRIC OR SUBSTANCE USE FACILITY | End: 2021-02-06
Attending: PSYCHIATRY & NEUROLOGY | Admitting: PSYCHIATRY & NEUROLOGY

## 2021-02-03 ENCOUNTER — EPISODE CHANGES (OUTPATIENT)
Dept: CASE MANAGEMENT | Facility: OTHER | Age: 46
End: 2021-02-03

## 2021-02-03 VITALS
DIASTOLIC BLOOD PRESSURE: 88 MMHG | OXYGEN SATURATION: 97 % | RESPIRATION RATE: 16 BRPM | TEMPERATURE: 97.4 F | HEART RATE: 63 BPM | HEIGHT: 72 IN | WEIGHT: 282.6 LBS | SYSTOLIC BLOOD PRESSURE: 130 MMHG | BODY MASS INDEX: 38.28 KG/M2

## 2021-02-03 PROBLEM — F12.20 CANNABIS USE DISORDER, MODERATE, DEPENDENCE (HCC): Status: ACTIVE | Noted: 2021-02-03

## 2021-02-03 PROBLEM — F19.959 SUBSTANCE-INDUCED PSYCHOTIC DISORDER: Status: ACTIVE | Noted: 2021-02-03

## 2021-02-03 PROBLEM — F23 ACUTE PSYCHOSIS (HCC): Status: ACTIVE | Noted: 2021-02-03

## 2021-02-03 LAB
ANION GAP SERPL CALCULATED.3IONS-SCNC: 11 MMOL/L (ref 5–15)
BASOPHILS # BLD AUTO: 0.05 10*3/MM3 (ref 0–0.2)
BASOPHILS NFR BLD AUTO: 0.9 % (ref 0–1.5)
BH CV ECHO MEAS - ACS: 1.9 CM
BH CV ECHO MEAS - AO MAX PG (FULL): -0.53 MMHG
BH CV ECHO MEAS - AO MAX PG: 6.9 MMHG
BH CV ECHO MEAS - AO MEAN PG (FULL): 0 MMHG
BH CV ECHO MEAS - AO MEAN PG: 4 MMHG
BH CV ECHO MEAS - AO ROOT AREA (BSA CORRECTED): 1.3
BH CV ECHO MEAS - AO ROOT AREA: 8.6 CM^2
BH CV ECHO MEAS - AO ROOT DIAM: 3.3 CM
BH CV ECHO MEAS - AO V2 MAX: 131 CM/SEC
BH CV ECHO MEAS - AO V2 MEAN: 90.8 CM/SEC
BH CV ECHO MEAS - AO V2 VTI: 25.8 CM
BH CV ECHO MEAS - ASC AORTA: 3.5 CM
BH CV ECHO MEAS - AVA(I,A): 4.7 CM^2
BH CV ECHO MEAS - AVA(I,D): 4.7 CM^2
BH CV ECHO MEAS - AVA(V,A): 4.7 CM^2
BH CV ECHO MEAS - AVA(V,D): 4.7 CM^2
BH CV ECHO MEAS - BSA(HAYCOCK): 2.6 M^2
BH CV ECHO MEAS - BSA: 2.5 M^2
BH CV ECHO MEAS - BZI_BMI: 38.2 KILOGRAMS/M^2
BH CV ECHO MEAS - BZI_METRIC_HEIGHT: 182.9 CM
BH CV ECHO MEAS - BZI_METRIC_WEIGHT: 127.9 KG
BH CV ECHO MEAS - EDV(CUBED): 124.3 ML
BH CV ECHO MEAS - EDV(MOD-SP2): 75 ML
BH CV ECHO MEAS - EDV(MOD-SP4): 61.3 ML
BH CV ECHO MEAS - EDV(TEICH): 117.7 ML
BH CV ECHO MEAS - EF(CUBED): 70.8 %
BH CV ECHO MEAS - EF(MOD-SP2): 60.8 %
BH CV ECHO MEAS - EF(MOD-SP4): 61.8 %
BH CV ECHO MEAS - EF(TEICH): 62.2 %
BH CV ECHO MEAS - ESV(CUBED): 36.3 ML
BH CV ECHO MEAS - ESV(MOD-SP2): 29.4 ML
BH CV ECHO MEAS - ESV(MOD-SP4): 23.4 ML
BH CV ECHO MEAS - ESV(TEICH): 44.5 ML
BH CV ECHO MEAS - FS: 33.7 %
BH CV ECHO MEAS - IVS/LVPW: 1.1
BH CV ECHO MEAS - IVSD: 1.5 CM
BH CV ECHO MEAS - LA DIMENSION: 3.5 CM
BH CV ECHO MEAS - LA/AO: 1.1
BH CV ECHO MEAS - LV DIASTOLIC VOL/BSA (35-75): 24.9 ML/M^2
BH CV ECHO MEAS - LV MASS(C)D: 287.5 GRAMS
BH CV ECHO MEAS - LV MASS(C)DI: 116.6 GRAMS/M^2
BH CV ECHO MEAS - LV MAX PG: 7.4 MMHG
BH CV ECHO MEAS - LV MEAN PG: 4 MMHG
BH CV ECHO MEAS - LV SYSTOLIC VOL/BSA (12-30): 9.5 ML/M^2
BH CV ECHO MEAS - LV V1 MAX: 136 CM/SEC
BH CV ECHO MEAS - LV V1 MEAN: 98.5 CM/SEC
BH CV ECHO MEAS - LV V1 VTI: 26.6 CM
BH CV ECHO MEAS - LVIDD: 5 CM
BH CV ECHO MEAS - LVIDS: 3.3 CM
BH CV ECHO MEAS - LVLD AP2: 8.3 CM
BH CV ECHO MEAS - LVLD AP4: 8.6 CM
BH CV ECHO MEAS - LVLS AP2: 7.1 CM
BH CV ECHO MEAS - LVLS AP4: 7.5 CM
BH CV ECHO MEAS - LVOT AREA (M): 4.5 CM^2
BH CV ECHO MEAS - LVOT AREA: 4.5 CM^2
BH CV ECHO MEAS - LVOT DIAM: 2.4 CM
BH CV ECHO MEAS - LVPWD: 1.3 CM
BH CV ECHO MEAS - MV A MAX VEL: 60.7 CM/SEC
BH CV ECHO MEAS - MV DEC SLOPE: 386 CM/SEC^2
BH CV ECHO MEAS - MV E MAX VEL: 79.9 CM/SEC
BH CV ECHO MEAS - MV E/A: 1.3
BH CV ECHO MEAS - MV P1/2T MAX VEL: 87.3 CM/SEC
BH CV ECHO MEAS - MV P1/2T: 66.2 MSEC
BH CV ECHO MEAS - MVA P1/2T LCG: 2.5 CM^2
BH CV ECHO MEAS - MVA(P1/2T): 3.3 CM^2
BH CV ECHO MEAS - PA MAX PG (FULL): 0.86 MMHG
BH CV ECHO MEAS - PA MAX PG: 2.5 MMHG
BH CV ECHO MEAS - PA V2 MAX: 79.5 CM/SEC
BH CV ECHO MEAS - PULM A REVS DUR: 0.11 SEC
BH CV ECHO MEAS - PULM A REVS VEL: 24.7 CM/SEC
BH CV ECHO MEAS - PULM DIAS VEL: 40.4 CM/SEC
BH CV ECHO MEAS - PULM S/D: 1
BH CV ECHO MEAS - PULM SYS VEL: 42.2 CM/SEC
BH CV ECHO MEAS - RAP SYSTOLE: 5 MMHG
BH CV ECHO MEAS - RV MAX PG: 1.7 MMHG
BH CV ECHO MEAS - RV MEAN PG: 1 MMHG
BH CV ECHO MEAS - RV V1 MAX: 64.6 CM/SEC
BH CV ECHO MEAS - RV V1 MEAN: 43 CM/SEC
BH CV ECHO MEAS - RV V1 VTI: 14.5 CM
BH CV ECHO MEAS - RVDD: 3.1 CM
BH CV ECHO MEAS - RVSP: 27.8 MMHG
BH CV ECHO MEAS - SI(AO): 89.5 ML/M^2
BH CV ECHO MEAS - SI(CUBED): 35.7 ML/M^2
BH CV ECHO MEAS - SI(LVOT): 48.8 ML/M^2
BH CV ECHO MEAS - SI(MOD-SP2): 18.5 ML/M^2
BH CV ECHO MEAS - SI(MOD-SP4): 15.4 ML/M^2
BH CV ECHO MEAS - SI(TEICH): 29.7 ML/M^2
BH CV ECHO MEAS - SV(AO): 220.7 ML
BH CV ECHO MEAS - SV(CUBED): 88 ML
BH CV ECHO MEAS - SV(LVOT): 120.3 ML
BH CV ECHO MEAS - SV(MOD-SP2): 45.6 ML
BH CV ECHO MEAS - SV(MOD-SP4): 37.9 ML
BH CV ECHO MEAS - SV(TEICH): 73.2 ML
BH CV ECHO MEAS - TR MAX VEL: 239 CM/SEC
BUN SERPL-MCNC: 8 MG/DL (ref 6–20)
BUN/CREAT SERPL: 7.8 (ref 7–25)
CALCIUM SPEC-SCNC: 9.4 MG/DL (ref 8.6–10.5)
CHLORIDE SERPL-SCNC: 99 MMOL/L (ref 98–107)
CO2 SERPL-SCNC: 27 MMOL/L (ref 22–29)
CREAT SERPL-MCNC: 1.03 MG/DL (ref 0.76–1.27)
DEPRECATED RDW RBC AUTO: 41.8 FL (ref 37–54)
EOSINOPHIL # BLD AUTO: 0.1 10*3/MM3 (ref 0–0.4)
EOSINOPHIL NFR BLD AUTO: 1.9 % (ref 0.3–6.2)
ERYTHROCYTE [DISTWIDTH] IN BLOOD BY AUTOMATED COUNT: 14.3 % (ref 12.3–15.4)
GFR SERPL CREATININE-BSD FRML MDRD: 95 ML/MIN/1.73
GLUCOSE BLDC GLUCOMTR-MCNC: 123 MG/DL (ref 70–130)
GLUCOSE BLDC GLUCOMTR-MCNC: 157 MG/DL (ref 70–130)
GLUCOSE BLDC GLUCOMTR-MCNC: 162 MG/DL (ref 70–130)
GLUCOSE BLDC GLUCOMTR-MCNC: 173 MG/DL (ref 70–130)
GLUCOSE SERPL-MCNC: 157 MG/DL (ref 65–99)
HCT VFR BLD AUTO: 38.5 % (ref 37.5–51)
HGB BLD-MCNC: 13.4 G/DL (ref 13–17.7)
IMM GRANULOCYTES # BLD AUTO: 0.01 10*3/MM3 (ref 0–0.05)
IMM GRANULOCYTES NFR BLD AUTO: 0.2 % (ref 0–0.5)
LYMPHOCYTES # BLD AUTO: 1.65 10*3/MM3 (ref 0.7–3.1)
LYMPHOCYTES NFR BLD AUTO: 31.1 % (ref 19.6–45.3)
MAXIMAL PREDICTED HEART RATE: 175 BPM
MCH RBC QN AUTO: 28.5 PG (ref 26.6–33)
MCHC RBC AUTO-ENTMCNC: 34.8 G/DL (ref 31.5–35.7)
MCV RBC AUTO: 81.9 FL (ref 79–97)
MONOCYTES # BLD AUTO: 0.54 10*3/MM3 (ref 0.1–0.9)
MONOCYTES NFR BLD AUTO: 10.2 % (ref 5–12)
NEUTROPHILS NFR BLD AUTO: 2.95 10*3/MM3 (ref 1.7–7)
NEUTROPHILS NFR BLD AUTO: 55.7 % (ref 42.7–76)
NRBC BLD AUTO-RTO: 0 /100 WBC (ref 0–0.2)
PLATELET # BLD AUTO: 294 10*3/MM3 (ref 140–450)
PMV BLD AUTO: 9.5 FL (ref 6–12)
POTASSIUM SERPL-SCNC: 3.1 MMOL/L (ref 3.5–5.2)
RBC # BLD AUTO: 4.7 10*6/MM3 (ref 4.14–5.8)
SODIUM SERPL-SCNC: 137 MMOL/L (ref 136–145)
STRESS TARGET HR: 149 BPM
WBC # BLD AUTO: 5.3 10*3/MM3 (ref 3.4–10.8)

## 2021-02-03 PROCEDURE — 82962 GLUCOSE BLOOD TEST: CPT

## 2021-02-03 PROCEDURE — 99223 1ST HOSP IP/OBS HIGH 75: CPT | Performed by: PSYCHIATRY & NEUROLOGY

## 2021-02-03 PROCEDURE — G0378 HOSPITAL OBSERVATION PER HR: HCPCS

## 2021-02-03 PROCEDURE — 93306 TTE W/DOPPLER COMPLETE: CPT

## 2021-02-03 PROCEDURE — 63710000001 INSULIN ASPART PER 5 UNITS: Performed by: HOSPITALIST

## 2021-02-03 PROCEDURE — 85025 COMPLETE CBC W/AUTO DIFF WBC: CPT | Performed by: HOSPITALIST

## 2021-02-03 PROCEDURE — 80048 BASIC METABOLIC PNL TOTAL CA: CPT | Performed by: HOSPITALIST

## 2021-02-03 PROCEDURE — 93306 TTE W/DOPPLER COMPLETE: CPT | Performed by: INTERNAL MEDICINE

## 2021-02-03 RX ORDER — ACETAMINOPHEN 325 MG/1
650 TABLET ORAL EVERY 6 HOURS PRN
Status: DISCONTINUED | OUTPATIENT
Start: 2021-02-03 | End: 2021-02-03 | Stop reason: HOSPADM

## 2021-02-03 RX ORDER — LABETALOL 200 MG/1
200 TABLET, FILM COATED ORAL 2 TIMES DAILY
Qty: 60 TABLET | Refills: 0 | Status: SHIPPED | OUTPATIENT
Start: 2021-02-03 | End: 2021-03-01

## 2021-02-03 RX ORDER — RISPERIDONE 1 MG/1
1 TABLET ORAL NIGHTLY
Status: DISCONTINUED | OUTPATIENT
Start: 2021-02-03 | End: 2021-02-04

## 2021-02-03 RX ORDER — ACETAMINOPHEN 325 MG/1
650 TABLET ORAL EVERY 4 HOURS PRN
Status: DISCONTINUED | OUTPATIENT
Start: 2021-02-03 | End: 2021-02-06 | Stop reason: HOSPADM

## 2021-02-03 RX ORDER — TRAZODONE HYDROCHLORIDE 50 MG/1
50 TABLET ORAL NIGHTLY PRN
Status: DISCONTINUED | OUTPATIENT
Start: 2021-02-03 | End: 2021-02-06 | Stop reason: HOSPADM

## 2021-02-03 RX ORDER — HYDROXYZINE PAMOATE 50 MG/1
50 CAPSULE ORAL EVERY 6 HOURS PRN
Status: DISCONTINUED | OUTPATIENT
Start: 2021-02-03 | End: 2021-02-06 | Stop reason: HOSPADM

## 2021-02-03 RX ORDER — ENALAPRIL MALEATE 20 MG/1
20 TABLET ORAL
Qty: 30 TABLET | Refills: 0 | Status: SHIPPED | OUTPATIENT
Start: 2021-02-04 | End: 2021-03-01

## 2021-02-03 RX ORDER — LOPERAMIDE HYDROCHLORIDE 2 MG/1
2 CAPSULE ORAL
Status: DISCONTINUED | OUTPATIENT
Start: 2021-02-03 | End: 2021-02-06 | Stop reason: HOSPADM

## 2021-02-03 RX ORDER — FAMOTIDINE 20 MG/1
20 TABLET, FILM COATED ORAL 2 TIMES DAILY PRN
Status: DISCONTINUED | OUTPATIENT
Start: 2021-02-03 | End: 2021-02-06 | Stop reason: HOSPADM

## 2021-02-03 RX ADMIN — LABETALOL HYDROCHLORIDE 200 MG: 200 TABLET, FILM COATED ORAL at 15:17

## 2021-02-03 RX ADMIN — ENALAPRIL MALEATE 20 MG: 10 TABLET ORAL at 08:50

## 2021-02-03 RX ADMIN — ACETAMINOPHEN 650 MG: 325 TABLET, FILM COATED ORAL at 04:12

## 2021-02-03 RX ADMIN — LABETALOL HYDROCHLORIDE 200 MG: 200 TABLET, FILM COATED ORAL at 05:51

## 2021-02-03 RX ADMIN — ALPRAZOLAM 1 MG: 1 TABLET ORAL at 15:19

## 2021-02-03 NOTE — DISCHARGE SUMMARY
[facility] Medicine Services  DISCHARGE SUMMARY       Date of Admission: 2/2/2021  Date of Discharge:  2/3/2021  Primary Care Physician: Aura Carrillo MD    Presenting Problem/History of Present Illness:  Elevated troponin [R77.8]  Psychosis, unspecified psychosis type (CMS/HCC) [F29]  Hypertension, unspecified type [I10]       Final Discharge Diagnoses:  Active Hospital Problems    Diagnosis   • **Hypertensive urgency   • Psychosis (CMS/HCC)   Type II MI    Consults:   Consults     Date and Time Order Name Status Description    2/3/2021 0847 Inpatient Psychiatrist Consult            Procedures Performed:                 Pertinent Test Results:   Lab Results (most recent)     Procedure Component Value Units Date/Time    POC Glucose Once [[id number]]  (Abnormal) Collected: 02/03/21 1020    Specimen: Blood Updated: 02/03/21 1052     Glucose 173 mg/dL      Comment: RN NotifiedOperator: [id number] DANIEL MELTONYMeter ID: [id number]       POC Glucose Once [[id number]]  (Abnormal) Collected: 02/03/21 0613    Specimen: Blood Updated: 02/03/21 0648     Glucose 157 mg/dL      Comment: RN NotifiedOperator: [id number] RITA CASTILLOMeter ID: [id number]       Basic Metabolic Panel [[id number]]  (Abnormal) Collected: 02/03/21 0600    Specimen: Blood Updated: 02/03/21 0639     Glucose 157 mg/dL      BUN 8 mg/dL      Creatinine 1.03 mg/dL      Sodium 137 mmol/L      Potassium 3.1 mmol/L      Chloride 99 mmol/L      CO2 27.0 mmol/L      Calcium 9.4 mg/dL      eGFR  African Amer 95 mL/min/1.73      BUN/Creatinine Ratio 7.8     Anion Gap 11.0 mmol/L     Narrative:      GFR Normal >60  Chronic Kidney Disease <60  Kidney Failure <15      CBC & Differential [[id number]]  (Normal) Collected: 02/03/21 0600    Specimen: Blood Updated: 02/03/21 0621    Narrative:      The following orders were created for panel order CBC & Differential.  Procedure                               Abnormality          Status                     ---------                               -----------         ------                     CBC Auto Differential[[id number]]        Normal              Final result                 Please view results for these tests on the individual orders.    CBC Auto Differential [[id number]]  (Normal) Collected: 02/03/21 0600    Specimen: Blood Updated: 02/03/21 0621     WBC 5.30 10*3/mm3      RBC 4.70 10*6/mm3      Hemoglobin 13.4 g/dL      Hematocrit 38.5 %      MCV 81.9 fL      MCH 28.5 pg      MCHC 34.8 g/dL      RDW 14.3 %      RDW-SD 41.8 fl      MPV 9.5 fL      Platelets 294 10*3/mm3      Neutrophil % 55.7 %      Lymphocyte % 31.1 %      Monocyte % 10.2 %      Eosinophil % 1.9 %      Basophil % 0.9 %      Immature Grans % 0.2 %      Neutrophils, Absolute 2.95 10*3/mm3      Lymphocytes, Absolute 1.65 10*3/mm3      Monocytes, Absolute 0.54 10*3/mm3      Eosinophils, Absolute 0.10 10*3/mm3      Basophils, Absolute 0.05 10*3/mm3      Immature Grans, Absolute 0.01 10*3/mm3      nRBC 0.0 /100 WBC     Urine Drug Screen - Urine, Clean Catch [[id number]]  (Abnormal) Collected: 02/02/21 2138    Specimen: Urine, Clean Catch Updated: 02/02/21 2153     THC, Screen, Urine Positive     Phencyclidine (PCP), Urine Negative     Cocaine Screen, Urine Negative     Methamphetamine, Ur Negative     Opiate Screen Negative     Amphetamine Screen, Urine Negative     Benzodiazepine Screen, Urine Positive     Tricyclic Antidepressants Screen Negative     Methadone Screen, Urine Negative     Barbiturates Screen, Urine Negative     Oxycodone Screen, Urine Negative     Propoxyphene Screen Negative     Buprenorphine, Screen, Urine Negative    Narrative:      Cutoff For Drugs Screened:    Amphetamines               500 ng/ml  Barbiturates               200 ng/ml  Benzodiazepines            150 ng/ml  Cocaine                    150 ng/ml  Methadone                  200 ng/ml  Opiates                    100 ng/ml  Phencyclidine                25 ng/ml  THC                            50 ng/ml  Methamphetamine            500 ng/ml  Tricyclic Antidepressants  300 ng/ml  Oxycodone                  100 ng/ml  Propoxyphene               300 ng/ml  Buprenorphine               10 ng/ml    The normal value for all drugs tested is negative. This report includes unconfirmed screening results, with the cutoff values listed, to be used for medical treatment purposes only.  Unconfirmed results must not be used for non-medical purposes such as employment or legal testing.  Clinical consideration should be applied to any drug of abuse test, particularly when unconfirmed results are used.      Troponin [[id number]]  (Normal) Collected: 02/02/21 1947    Specimen: Blood Updated: 02/02/21 2007     Troponin T 0.027 ng/mL     Narrative:      Troponin T Reference Range:  <= 0.03 ng/mL-   Negative for AMI  >0.03 ng/mL-     Abnormal for myocardial necrosis.  Clinicians would have to utilize clinical acumen, EKG, Troponin and serial changes to determine if it is an Acute Myocardial Infarction or myocardial injury due to an underlying chronic condition.       Results may be falsely decreased if patient taking Biotin.      Troponin [[id number]]  (Normal) Collected: 02/02/21 1419    Specimen: Blood Updated: 02/02/21 1440     Troponin T 0.030 ng/mL     Narrative:      Troponin T Reference Range:  <= 0.03 ng/mL-   Negative for AMI  >0.03 ng/mL-     Abnormal for myocardial necrosis.  Clinicians would have to utilize clinical acumen, EKG, Troponin and serial changes to determine if it is an Acute Myocardial Infarction or myocardial injury due to an underlying chronic condition.       Results may be falsely decreased if patient taking Biotin.      COVID-19 and FLU A/B PCR - Swab, Nasopharynx [[id number]]  (Normal) Collected: 02/02/21 1238    Specimen: Swab from Nasopharynx Updated: 02/02/21 1425     COVID19 Not Detected     Influenza A PCR Not Detected     Influenza B PCR Not  Detected    Narrative:      Fact sheet for providers: https://www.fda.gov/media/016325/download    Fact sheet for patients: https://www.fda.gov/media/394756/download    Test performed by PCR.    [place] Draw [[id number]] Collected: 02/02/21 1223    Specimen: Blood Updated: 02/02/21 1331    Narrative:      The following orders were created for panel order [place] Draw.  Procedure                               Abnormality         Status                     ---------                               -----------         ------                     Light Blue Top[[id number]]                                   Final result               Green Top (Gel)[[id number]]                                  Final result               Lavender Top[[id number]]                                     Final result               Gold Top - SST[[id number]]                                   Final result                 Please view results for these tests on the individual orders.    Green Top (Gel) [[id number]] Collected: 02/02/21 1223    Specimen: Blood Updated: 02/02/21 1331     Extra Tube Hold for add-ons.     Comment: Auto resulted.       Light Blue Top [[id number]] Collected: 02/02/21 1223    Specimen: Blood Updated: 02/02/21 1330     Extra Tube hold for add-on     Comment: Auto resulted       Lavender Top [[id number]] Collected: 02/02/21 1223    Specimen: Blood Updated: 02/02/21 1330     Extra Tube hold for add-on     Comment: Auto resulted       Gold Top - SST [[id number]] Collected: 02/02/21 1223    Specimen: Blood Updated: 02/02/21 1330     Extra Tube Hold for add-ons.     Comment: Auto resulted.       TSH+Free T4 [[id number]]  (Abnormal) Collected: 02/02/21 1223    Specimen: Blood Updated: 02/02/21 1308     TSH 3.050 uIU/mL      Free T4 1.76 ng/dL      Comment: T4 results may be falsely increased if patient taking Biotin.       BNP [[id number]]  (Normal) Collected: 02/02/21 1223    Specimen: Blood Updated: 02/02/21 1308     proBNP 248.0 pg/mL      Narrative:      Among patients with dyspnea, NT-proBNP is highly sensitive for the detection of acute congestive heart failure. In addition NT-proBNP of <300 pg/ml effectively rules out acute congestive heart failure with 99% negative predictive value.    Results may be falsely decreased if patient taking Biotin.      Comprehensive Metabolic Panel [[id number]]  (Abnormal) Collected: 02/02/21 1223    Specimen: Blood Updated: 02/02/21 1303     Glucose 169 mg/dL      BUN 10 mg/dL      Creatinine 1.14 mg/dL      Sodium 137 mmol/L      Potassium 3.1 mmol/L      Chloride 98 mmol/L      CO2 27.0 mmol/L      Calcium 10.0 mg/dL      Total Protein 7.9 g/dL      Albumin 4.50 g/dL      ALT (SGPT) 12 U/L      AST (SGOT) 13 U/L      Alkaline Phosphatase 62 U/L      Total Bilirubin 0.4 mg/dL      eGFR  African Amer 84 mL/min/1.73      Globulin 3.4 gm/dL      A/G Ratio 1.3 g/dL      BUN/Creatinine Ratio 8.8     Anion Gap 12.0 mmol/L     Narrative:      GFR Normal >60  Chronic Kidney Disease <60  Kidney Failure <15      Acetaminophen Level [[id number]]  (Normal) Collected: 02/02/21 1223    Specimen: Blood Updated: 02/02/21 1303     Acetaminophen <5.0 mcg/mL     Ethanol [[id number]] Collected: 02/02/21 1223    Specimen: Blood Updated: 02/02/21 1303     Ethanol <10 mg/dL      Ethanol % <0.010 %     Salicylate Level [[id number]]  (Normal) Collected: 02/02/21 1223    Specimen: Blood Updated: 02/02/21 1303     Salicylate <0.3 mg/dL     CBC & Differential [[id number]]  (Abnormal) Collected: 02/02/21 1223    Specimen: Blood Updated: 02/02/21 1236    Narrative:      The following orders were created for panel order CBC & Differential.  Procedure                               Abnormality         Status                     ---------                               -----------         ------                     CBC Auto Differential[[id number]]        Abnormal            Final result                 Please view results for these tests on the  individual orders.    CBC Auto Differential [[id number]]  (Abnormal) Collected: 02/02/21 1223    Specimen: Blood Updated: 02/02/21 1236     WBC 5.58 10*3/mm3      RBC 4.77 10*6/mm3      Hemoglobin 14.0 g/dL      Hematocrit 38.8 %      MCV 81.3 fL      MCH 29.4 pg      MCHC 36.1 g/dL      RDW 14.1 %      RDW-SD 40.8 fl      MPV 9.4 fL      Platelets 311 10*3/mm3      Neutrophil % 62.9 %      Lymphocyte % 27.8 %      Monocyte % 7.7 %      Eosinophil % 0.7 %      Basophil % 0.7 %      Immature Grans % 0.2 %      Neutrophils, Absolute 3.51 10*3/mm3      Lymphocytes, Absolute 1.55 10*3/mm3      Monocytes, Absolute 0.43 10*3/mm3      Eosinophils, Absolute 0.04 10*3/mm3      Basophils, Absolute 0.04 10*3/mm3      Immature Grans, Absolute 0.01 10*3/mm3      nRBC 0.0 /100 WBC         Imaging Results (Most Recent)     Procedure Component Value Units Date/Time    XR Chest 1 View [[id number]] Collected: 02/02/21 1217     Updated: 02/02/21 1239    Narrative:        PROCEDURE: Single chest view portable    REASON FOR EXAM:HTN    FINDINGS: Comparison exam dated November 7, 2018. Cardiac and  pulmonary vasculature are normal. Lungs are clear. Pleural spaces  are normal. No acute osseous abnormality.      Impression:      Negative single view chest     Electronically signed by:  Solis Hernández MD  2/2/2021 12:38 PM CST  Workstation: [id number]          Chief Complaint on Day of Discharge: None.  Patient denies any chest pain, abdominal pain, shortness of breath or any other concerning symptoms.    Hospital Course:  The patient is a 45 y.o. male who presented to [facility] with paranoia, hypertensive urgency.  Patient's blood pressure improved with modified regimen.  Patient refused IV medications for his hypertension.  Echo was done that showed some diastolic dysfunction which is likely from his uncontrolled hypertension.  Patient had mildly elevated.  Troponin and subsequent troponins were normal.  He elevated  "troponin were thought to be from his uncontrolled blood pressure on presentation.  Patient adamantly refused to get stress test done.  Patient denied any chest pain or any other concerning symptoms.  Psychiatry was consulted for his paranoia and was discharged to behavioral unit in stable condition.    Condition on Discharge: Fair    Physical Exam on Discharge:  /88 (BP Location: Left arm, Patient Position: Lying)   Pulse 68   Temp 97.4 °F (36.3 °C) (Temporal)   Resp 16   Ht 182.9 cm (72.01\")   Wt 128 kg (282 lb 9.6 oz)   SpO2 97%   BMI 38.32 kg/m²   Physical Exam  Constitutional:       Appearance: He is well-developed.   HENT:      Head: Normocephalic and atraumatic.   Eyes:      Pupils: Pupils are equal, round, and reactive to light.   Neck:      Musculoskeletal: Normal range of motion.   Cardiovascular:      Rate and Rhythm: Normal rate.   Pulmonary:      Breath sounds: Decreased breath sounds present. No wheezing.   Abdominal:      Palpations: Abdomen is soft.      Tenderness: There is no abdominal tenderness.   Skin:     General: Skin is warm and dry.   Neurological:      Mental Status: He is alert and oriented to person, place, and time.           Discharge Disposition:  Psychiatric Hospital or Unit ([place] [facility])    Discharge Medications:     Discharge Medications      New Medications      Instructions Start Date   labetalol 200 MG tablet  Commonly known as: NORMODYNE   200 mg, Oral, 2 Times Daily      metFORMIN 500 MG tablet  Commonly known as: Glucophage   500 mg, Oral, 2 Times Daily With Meals         Changes to Medications      Instructions Start Date   enalapril 20 MG tablet  Commonly known as: VASOTEC  What changed:   · medication strength  · how much to take  · when to take this   20 mg, Oral, Every 24 Hours Scheduled   Start Date: February 4, 2021        Continue These Medications      Instructions Start Date   ALPRAZolam 1 MG tablet  Commonly known as: XANAX   Take 1 tablet by " mouth At Night As Needed for Anxiety. **May take up to 15 tablets per month**      atorvastatin 20 MG tablet  Commonly known as: LIPITOR   20 mg, Oral, Nightly      vitamin D 1.25 MG (68297 UT) capsule capsule  Commonly known as: ERGOCALCIFEROL   50,000 Units, Oral, Weekly             Discharge Diet: Diabetic    Activity at Discharge: Ad kareen.    Discharge Care Plan/Instructions: Return to ER for any worsening symptoms.  Follow-up with providers as recommended.  Take medications as prescribed.  Follow-up with PCP with blood pressure and blood sugar readings in 1 week after discharge    Follow-up Appointments:   No future appointments.    Test Results Pending at Discharge:     Signed     Dr Aj Kaur,    2/3/2021  15:50 CST        Time: More than 30 minutes spent on discharge

## 2021-02-03 NOTE — NURSING NOTE
"Pt admitted to [facility] on a 72hr hold from [facility]. He was admitted yesterday for psychosis. 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 She reports he has been non compliant with all meds including Metformin for past few months. Per staff , pt has not slept in 5 days and has frequent flight of ideas. He accused his wife of \"smelling of condoms\" and they had an argument which resulted in him being admitted for psychosis. Pt is alert, oriented to self and place at this time. Somewhat cooperative during parts of admission. Security escorted pt along with [facility] staff. Oriented to [facility] and our rules. Will monitor and provide a safe environment.   "

## 2021-02-03 NOTE — ED NOTES
Called hospitalist about pt's blood pressure. He is aware that we can not move him until his bp is under control. He will check pt's chart and advise.      Noemi Luis RN  02/02/21 2121

## 2021-02-03 NOTE — ED NOTES
Pt refusing his nicardipine drip, [facility] hospitalist, he is aware.      Noemi Luis, ALBERT  02/02/21 6226

## 2021-02-04 LAB
CHOLEST SERPL-MCNC: 179 MG/DL (ref 0–200)
GLUCOSE BLDC GLUCOMTR-MCNC: 119 MG/DL (ref 70–130)
GLUCOSE BLDC GLUCOMTR-MCNC: 157 MG/DL (ref 70–130)
GLUCOSE BLDC GLUCOMTR-MCNC: 186 MG/DL (ref 70–130)
GLUCOSE P FAST SERPL-MCNC: 154 MG/DL (ref 74–106)
HDLC SERPL-MCNC: 34 MG/DL (ref 40–60)
LDLC SERPL CALC-MCNC: 128 MG/DL (ref 0–100)
LDLC/HDLC SERPL: 3.72 {RATIO}
TRIGL SERPL-MCNC: 92 MG/DL (ref 0–150)
VLDLC SERPL-MCNC: 17 MG/DL (ref 5–40)

## 2021-02-04 PROCEDURE — 82962 GLUCOSE BLOOD TEST: CPT

## 2021-02-04 PROCEDURE — 99232 SBSQ HOSP IP/OBS MODERATE 35: CPT | Performed by: PSYCHIATRY & NEUROLOGY

## 2021-02-04 PROCEDURE — 82947 ASSAY GLUCOSE BLOOD QUANT: CPT | Performed by: PSYCHIATRY & NEUROLOGY

## 2021-02-04 PROCEDURE — 90833 PSYTX W PT W E/M 30 MIN: CPT | Performed by: PSYCHIATRY & NEUROLOGY

## 2021-02-04 PROCEDURE — 80061 LIPID PANEL: CPT | Performed by: PSYCHIATRY & NEUROLOGY

## 2021-02-04 RX ORDER — RISPERIDONE 1 MG/1
1 TABLET ORAL 2 TIMES DAILY
Status: DISCONTINUED | OUTPATIENT
Start: 2021-02-05 | End: 2021-02-06 | Stop reason: HOSPADM

## 2021-02-04 RX ORDER — DEXTROSE MONOHYDRATE 25 G/50ML
25 INJECTION, SOLUTION INTRAVENOUS
Status: DISCONTINUED | OUTPATIENT
Start: 2021-02-04 | End: 2021-02-06 | Stop reason: HOSPADM

## 2021-02-04 RX ORDER — ENALAPRIL MALEATE 10 MG/1
20 TABLET ORAL
Status: DISCONTINUED | OUTPATIENT
Start: 2021-02-04 | End: 2021-02-06 | Stop reason: HOSPADM

## 2021-02-04 RX ORDER — NICOTINE POLACRILEX 4 MG
15 LOZENGE BUCCAL
Status: DISCONTINUED | OUTPATIENT
Start: 2021-02-04 | End: 2021-02-06 | Stop reason: HOSPADM

## 2021-02-04 RX ORDER — MULTIPLE VITAMINS W/ MINERALS TAB 9MG-400MCG
1 TAB ORAL DAILY
Status: DISCONTINUED | OUTPATIENT
Start: 2021-02-05 | End: 2021-02-06 | Stop reason: HOSPADM

## 2021-02-04 RX ORDER — MELATONIN
2000
Status: DISCONTINUED | OUTPATIENT
Start: 2021-02-04 | End: 2021-02-06 | Stop reason: HOSPADM

## 2021-02-04 RX ORDER — ATORVASTATIN CALCIUM 20 MG/1
20 TABLET, FILM COATED ORAL NIGHTLY
Status: DISCONTINUED | OUTPATIENT
Start: 2021-02-04 | End: 2021-02-06 | Stop reason: HOSPADM

## 2021-02-04 RX ORDER — LABETALOL 200 MG/1
200 TABLET, FILM COATED ORAL 2 TIMES DAILY
Status: DISCONTINUED | OUTPATIENT
Start: 2021-02-04 | End: 2021-02-06 | Stop reason: HOSPADM

## 2021-02-04 RX ADMIN — METFORMIN HYDROCHLORIDE 500 MG: 500 TABLET ORAL at 17:09

## 2021-02-04 RX ADMIN — ATORVASTATIN CALCIUM 20 MG: 20 TABLET, FILM COATED ORAL at 20:27

## 2021-02-04 RX ADMIN — LABETALOL HYDROCHLORIDE 200 MG: 200 TABLET, FILM COATED ORAL at 20:27

## 2021-02-04 RX ADMIN — Medication 2000 UNITS: at 21:43

## 2021-02-04 RX ADMIN — ENALAPRIL MALEATE 20 MG: 10 TABLET ORAL at 16:18

## 2021-02-04 NOTE — NURSING NOTE
"Behavior   Note any precipitants to event or behavior   Describe level and action of any aggressive behavior or speech and associated interventions.     Anxiety: Patient denies at this time  Depression: Patient denies at this time  Pain  0  AVH   no  S/I   no  Plan  no  H/I   no  Plan  no    Affect   flat      Note: Patient denied SI/HI/AVH. He refused to take his risperidone. He says \"I don't need that, I am being misdiagnosed and I don't even know why I am up here.\" He wants to speak to the doctor tomorrow about this. I attempted to talk with the patient on why he was here and he doesn't believe that he has anything wrong going on with him. 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       Intervention    PRN medication utilized:  no    Instructed in medication usage and effects  Medications administered as ordered  Encouraged to verbalize needs      Response    Verbalized understanding   Did patient take medications as ordered no  Did patient interact with assessment?  yes     Plan    Will monitor for safety  Will monitor every 15 minutes as ordered  Will evaluate and promote the plan of care    Last BM:  unknown date  (Please chart in I/O as well)    "

## 2021-02-04 NOTE — NURSING NOTE
"Pt asked to have his FSBS taken this morning. It was 186. He reports regulating his \"sugar\" with diet and exercise. He has metformin at home but does not take it and has never been on a sliding scale. He denies wanting any intervention for the 186 reading.  present and made aware.   "

## 2021-02-04 NOTE — NURSING NOTE
Behavior   Note any precipitants to event or behavior   Describe level and action of any aggressive behavior or speech and associated interventions.     Anxiety: Patient denies at this time  Depression: Patient denies at this time  Pain  0  AVH   no  S/I   no  Plan  no  H/I   no  Plan  no    Affect   flat      Note:Pt is alert, oriented x3 verbal and ambulatory. Appropriate interaction with staff and peers. Pt did talk with me about his blood sugar and not taking Metformin. He denies any needs at this time. Will continue to monitor and provide a safe environment.       Intervention    PRN medication utilized:  no    Instructed in medication usage and effects  Medications administered as ordered  Encouraged to verbalize needs      Response    Verbalized understanding   Did patient take medications as ordered yes   Did patient interact with assessment?  yes     Plan    Will monitor for safety  Will monitor every 15 minutes as ordered  Will evaluate and promote the plan of care    Last BM:  unknown date  (Please chart in I/O as well)
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
## 2021-02-04 NOTE — H&P
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 psychopharmacological management.    3) Patient placed on  Q15 minute checks and Elopement and Aggression precautions.    4) Hospitalist consulted for assistance in management of medical comorbidities.    5) Will order following labs:   -- Folate, B12, Vitamin D to evaluate for any contributing etiologies.   -- Fasting lipids & glucose to establish baseline for any anti-d2 agent therapy    6) Will restart patient on the following psychiatric home meds:   --Not applicable; none at home.     7) Will make the following medication changes, and proceed with the following treatment planning:   --Risperdal 1 mg qhs for psychosis & BAD  --consider gay    8) Will begin discharge planning as appropriate for patient.    9) Psychotherapy provided: <15 min.     All questions answered for the patient.  Treatment plan and medication risks and benefits discussed with: Patient.  For which grams of beneficence and nonmalfeasance.      Estimated Length of Stay: 7 days  Prognosis: Susan Montgomery II, MD  02/03/21 @ 21:10 CST  Dictated using Dragon.

## 2021-02-04 NOTE — PLAN OF CARE
Goal Outcome Evaluation:  Plan of Care Reviewed With: patient  Progress: no change  Outcome Summary: Pt has been isolating self in his room today. Very little interaction with others.

## 2021-02-04 NOTE — PLAN OF CARE
Goal Outcome Evaluation:  Plan of Care Reviewed With: patient  Progress: no change  Outcome Summary: patient has slept approximately 8 hours so far tonight and is still asleep.

## 2021-02-04 NOTE — PROGRESS NOTES
"2/4/2021    Chief Complaint: psychosis, delusions, paranoia and substance abuse    Subjective:  Patient is a 45 y.o. male who is inpatient on the adult U. He is seen this am in his room. He states he should not be here and that \"several people hear the voice of God and are not considered crazy.\" He states he has been having trouble with his wife and believes she is having an affair or being force to have an affair. He admits that it is \"truly too deep to tell all the details\". He states he thinks his wife was coerced into bringing him here against her will. He also discussed that he has invented a sit down tiller and a scissor lift. 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  He discussed a \"large sum of money and a house left to him\" he cannot find these items and is suppose to be in [place].     He refuses to take any medication for his \"mind\" as his mind is not sick. He will continue his blood pressure medication because that is needed.     He denies SI/HI. He denies AVH except for the voice of God.     When seen later in the day, patient's grandiosity is more notable.  He shows no insight into his condition.  Reports he has no issues and no need for any psychiatric medications.  We really reviewed the concerns of his situation including his paranoia, he further escalates.  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  When I attempt to ask this further he states that I should call \"Hank\"; ask about Hank he says he will call the president he will want to know that I am here.    We contacted patient's wife, Ngozi, at [phone].  She reports that patient had an approximately 5-day history of decreasing need for sleep in the setting more erratic and bizarre behavior.  Impulses.  Irritable.  Was paranoid and grandiose as well.  Saying savings hold messages from God and angels.  He states that he began to act more erratic during this time as well.  He drove his " car at 5 AM to the [facility] [employer] and sat out there for several hours.  He reports that another point tenderness Friday.  He drove to the [facility] and demanded either a helicopter or an airplane but would not specify as to why and does not have an aviation background.  She reports his patient became more erratic she began to be tearful for his wellbeing as well as hers.  She states patient has been living with her ever since around December 20, as patient had moved up to live with his mother for some time before that as there was a separation in their marriage.  We discussed the patient's current situation and concerns for compliance.  She expressed benefit of the idea of long-acting injectable.  I discussed that would not be able to force medicine here but patient may need to go to [facility] for de monalisa process, she voiced understanding.      Objective     Vital Signs    Temp:  [97.3 °F (36.3 °C)-97.6 °F (36.4 °C)] 97.3 °F (36.3 °C)  Heart Rate:  [63-91] 91  Resp:  [16-18] 18  BP: (130-173)/(77-96) 173/80    Physical Exam:   General Appearance: alert, appears stated age and interactive  Hygiene:   fair  Gait & Station: Normal  Musculoskeletal: No tremors or abnormal involuntary movements    Mental Status Exam:   Cooperation:  Guarded  Eye Contact:  Fair  Psychomotor Behavior:  Appropriate  Mood: Anxious/Nervous  Affect: Blunted  Speech:  Normal rate, rhythm and volume to mildly hyperverbal at times.  Thought Process:  Disorganized  Associations: Goal Directed and Tangential  Thought Content:     Bizarre   Suicidal:  None   Homicidal:  None   Hallucinations:  Auditory   Delusion:  Paranoid, persecution, grandiose  Cognitive Functioning:   Consciousness: awake, alert and oriented  Reliability:  poor  Insight:  Poor  Judgement:  Impaired  Impulse Control:  Impaired    Lab Results (last 24 hours)     Procedure Component Value Units Date/Time    POC Glucose Once [[id number]]   (Abnormal) Collected: 02/04/21 0736    Specimen: Blood Updated: 02/04/21 0801     Glucose 186 mg/dL      Comment: RN NotifiedOperator: [id number] ANJUM Del Toroer ID: [id number]       Glucose, Fasting [[id number]]  (Abnormal) Collected: 02/04/21 0633    Specimen: Blood Updated: 02/04/21 0723     Glucose, Fasting 154 mg/dL     Lipid Panel [[id number]]  (Abnormal) Collected: 02/04/21 0633    Specimen: Blood Updated: 02/04/21 0723     Total Cholesterol 179 mg/dL      Triglycerides 92 mg/dL      HDL Cholesterol 34 mg/dL      LDL Cholesterol  128 mg/dL      VLDL Cholesterol 17 mg/dL      LDL/HDL Ratio 3.72    Narrative:      Cholesterol Reference Ranges  (U.S. Department of Health and Human Services ATP III Classifications)    Desirable          <200 mg/dL  Borderline High    200-239 mg/dL  High Risk          >240 mg/dL      Triglyceride Reference Ranges  (U.S. Department of Health and Human Services ATP III Classifications)    Normal           <150 mg/dL  Borderline High  150-199 mg/dL  High             200-499 mg/dL  Very High        >500 mg/dL    HDL Reference Ranges  (U.S. Department of Health and Human Services ATP III Classifcations)    Low     <40 mg/dl (major risk factor for CHD)  High    >60 mg/dl ('negative' risk factor for CHD)        LDL Reference Ranges  (U.S. Department of Health and Human Services ATP III Classifcations)    Optimal          <100 mg/dL  Near Optimal     100-129 mg/dL  Borderline High  130-159 mg/dL  High             160-189 mg/dL  Very High        >189 mg/dL        Imaging Results (Last 24 Hours)     ** No results found for the last 24 hours. **          Medicine:   Current Facility-Administered Medications:   •  acetaminophen (TYLENOL) tablet 650 mg, 650 mg, Oral, Q4H PRN, Anson Montgomery II, MD  •  famotidine (PEPCID) tablet 20 mg, 20 mg, Oral, BID PRN, Anson Montgomery II, MD  •  hydrOXYzine pamoate (VISTARIL) capsule 50 mg, 50 mg, Oral, Q6H PRN, Anson Montgomery II  MD  •  loperamide (IMODIUM) capsule 2 mg, 2 mg, Oral, Q2H PRN, Anson Montgomery II, MD  •  magnesium hydroxide (MILK OF MAGNESIA) suspension 2400 mg/10mL 10 mL, 10 mL, Oral, Daily PRN, Anson Montgomery II, MD  •  risperiDONE (risperDAL) tablet 1 mg, 1 mg, Oral, Nightly, Anson Montgomery II, MD  •  traZODone (DESYREL) tablet 50 mg, 50 mg, Oral, Nightly PRN, Anson Montgomery II, MD    Diagnoses/Assessment:     Bipolar I disorder, current or most recent episode manic, with psychotic features (CMS/HCC)    Acute psychosis (CMS/HCC)    Cannabis use disorder, moderate, dependence (CMS/HCC)    Rule In / Out Substance-induced psychotic disorder       Treatment Plan:    1) Will continue care for the patient on the behavioral health unit at [facility] to ensure patient safety.  2) Will continue to provide treatment with the unit milieu, activities, therapies and psychopharmacological management.  3) Patient to be placed on or continued on  Q15 minute checks  and Elopement and Aggression precautions.  4) Pertinent medical issues:   --HTN  --Type 2 DM  --GERD  5) Will order following labs: none  6) Will make the following medication changes:   --Start Vitamin Supplementation  --Cont Risperdal 1 mg QHS; add 1 mg in the morning to get increased number of times medicines being offered to aid in compliance  7) Will continue discharge planning as appropriate for patient.  8) Psychotherapy provided for less than 16 minutes.    Treatment plan and medication risks and benefits discussed with: Patient and wife    Anson Montgomery II, MD  02/04/21  21:16 CST      Psychotherapy Note  --Total Psychotherapy Time: 18 minutes  --Participants: Patient, myself, LATONIA Tubbs  --Current Clinical Status: Paranoid  --Focus of Therapeutic Encounter:  rapport and insight  --Intervention Type: Supportive, CBT/cognitive, Psycho-Educational and Insight Focused  --Therapy notes: I provided reflective listening, supportive  therapy, reflection, and allowed them to express affect in therapy course.  Cognitive behavioral therapy targeting schema burden, distortions - identifying and challenging these.  Educational regarding his own condition and treatment.  Insight increase awareness about his situation and dysfunctional behaviors.  Also focusing on development of furthering therapeutic alliance.  --DX: as above  --Plan: Continue to work on developing & strengthening coping skills; correcting maladaptive schema; work on insight and work along rapport  --Post therapy status: improving affect      Anson Montgomery II, MD  02/04/21 @ 21:16 CST

## 2021-02-05 LAB
GLUCOSE BLDC GLUCOMTR-MCNC: 132 MG/DL (ref 70–130)
QT INTERVAL: 314 MS
QTC INTERVAL: 362 MS

## 2021-02-05 PROCEDURE — 82962 GLUCOSE BLOOD TEST: CPT

## 2021-02-05 PROCEDURE — 99233 SBSQ HOSP IP/OBS HIGH 50: CPT | Performed by: PSYCHIATRY & NEUROLOGY

## 2021-02-05 RX ADMIN — METFORMIN HYDROCHLORIDE 500 MG: 500 TABLET ORAL at 18:13

## 2021-02-05 RX ADMIN — ENALAPRIL MALEATE 20 MG: 10 TABLET ORAL at 08:25

## 2021-02-05 RX ADMIN — Medication 2000 UNITS: at 18:13

## 2021-02-05 RX ADMIN — Medication 1 TABLET: at 08:26

## 2021-02-05 RX ADMIN — ATORVASTATIN CALCIUM 20 MG: 20 TABLET, FILM COATED ORAL at 20:25

## 2021-02-05 RX ADMIN — METFORMIN HYDROCHLORIDE 500 MG: 500 TABLET ORAL at 08:26

## 2021-02-05 NOTE — DISCHARGE PLACEMENT REQUEST
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 Workstation: [id number]      No results found for: GLUF     Lab Results   Component Value Date    HGBA1C 7.60 (H) 08/05/2020       Lab Results   Component Value Date    CHOL 239 (H) 08/05/2020    TRIG 201 (H) 08/05/2020    HDL 39 (L) 08/05/2020     (H) 08/05/2020    VLDL 40.2 08/05/2020    LDLHDL 4.10 (H) 08/05/2020        TSH   Date Value Ref Range Status   02/02/2021 3.050 0.270 - 4.200 uIU/mL Final       Lab Results   Component Value Date    PPRR11II 23.5 (L) 08/05/2020    [id number] 563 10/17/2018       No results found for: IRON, TIBC, FERRITIN      --> MARY:   MARY Patient Controlled Substance Report (from 2/4/2020 to 2/3/2021)    Dispensed  Strength Quantity Days Supply Provider Pharmacy   02/01/2021 Alprazolam 1MG 15 each 30 [facility]...   12/22/2020 Alprazolam 1MG 15 each 30 [facility]...              Assessment/Plan   --Patient Strengths: ability for insight, communication skills   --Patient Barriers: marital/family conflict, substance abuse      --Diagnostic Impression: Mr. Castaneda  is a 45 y.o. male admitted for gross psychosis, paranoia, constituting an  imminent risk of harm to self and others - necessitating inpatient psychiatric stabilization and treatment.     Diagnostically,  appears consistent with BP1 w/ psychosis.  THC use d/o.  Cannot r/o sub induced component.      Moving forward,       Assessment:  --  Acute psychosis (CMS/HCC)    --Bipolar D/o, Type I, manic  -_THC Use D/o  --R/O Sub induced psychotic d/o  --HTN      Treatment Plan:  1) Will admit patient to the behavioral health unit at [facility], adult behavioral health unit, as a under behavioral health hold expiring 2/8 to ensure patient safety given imminent risk status and need for emergent inpatient stabilization and treatment.    2) Patient will be provided treatment with the unit milieu, activities, therapies and psychopharmacological management.    3) Patient placed on  Q15 minute checks and Elopement and Aggression precautions.    4) Hospitalist consulted for assistance in management of medical comorbidities.    5) Will order following labs:   -- Folate, B12, Vitamin D to evaluate for any contributing etiologies.   -- Fasting lipids & glucose to establish baseline for any anti-d2 agent therapy    6) Will restart patient on the following psychiatric home meds:   --Not applicable; none at home.     7) Will make the following medication changes, and proceed with the following treatment planning:   --Risperdal 1 mg qhs for psychosis & BAD  --consider gay    8) Will begin discharge planning as appropriate for patient.    9) Psychotherapy provided: <15 min.     All questions answered for the patient.  Treatment plan and medication risks and benefits discussed with: Patient.  For which grams of beneficence and nonmalfeasance.      Estimated Length of Stay: 7 days  Prognosis: Fair      Anson Montgomery II, MD  02/03/21 @ 21:10 CST  Dictated using Dragon.      Electronically signed by Anson Montgomery II, MD at 02/03/21 8175       Vital Signs (last 3 days)     Date/Time   Temp   Temp src   Pulse   Resp   BP   Patient Position   SpO2    02/05/21 0700   97.8 (36.6)   Tympanic   69   18   158/80   Lying   94     02/05/21 0506   --   --   --   --   142/100   Lying   --    02/05/21 0030   --   --   --   --   144/96   Lying   --    02/04/21 2215   --   --   --   --   (!) 152/102   Sitting   --    02/04/21 1900   98.1 (36.7)   Tympanic   90   20   148/100   Sitting   96    02/04/21 0811   97.3 (36.3)   Tympanic   91   18   173/80   Sitting   95    02/03/21 2204   97.6 (36.4)   Tympanic   76   18   139/77   Sitting   94    02/03/21 1718   97.5 (36.4)   Tympanic   73   18   160/96   Sitting   97                Current Facility-Administered Medications   Medication Dose Route Frequency Provider Last Rate Last Admin   • acetaminophen (TYLENOL) tablet 650 mg  650 mg Oral Q4H PRN Anson Montgomery II, MD       • atorvastatin (LIPITOR) tablet 20 mg  20 mg Oral Nightly Manuel Myers APRN   20 mg at 02/04/21 2027   • cholecalciferol (VITAMIN D3) tablet 2,000 Units  2,000 Units Oral Daily With Dinner Anson Montgomery II, MD   2,000 Units at 02/04/21 2143   • dextrose (D50W) 25 g/ 50mL Intravenous Solution 25 g  25 g Intravenous Q15 Min PRN Manuel Myers APRN       • dextrose (GLUTOSE) oral gel 15 g  15 g Oral Q15 Min PRN Manuel Myers APRN       • enalapril (VASOTEC) tablet 20 mg  20 mg Oral Q24H Manuel Myers APRN   20 mg at 02/05/21 0825   • famotidine (PEPCID) tablet 20 mg  20 mg Oral BID PRN Anson Montgomery II, MD       • glucagon (human recombinant) (GLUCAGEN DIAGNOSTIC) injection 1 mg  1 mg Subcutaneous Q15 Min PRN Manuel Myers APRN       • hydrOXYzine pamoate (VISTARIL) capsule 50 mg  50 mg Oral Q6H PRN Anson Montgomery II, MD       • insulin aspart (novoLOG) injection 0-7 Units  0-7 Units Subcutaneous TID AC Manuel Myers APRN       • labetalol (NORMODYNE) tablet 200 mg  200 mg Oral BID Manuel Myers APRN   200 mg at 02/04/21 2027   • loperamide (IMODIUM) capsule 2 mg  2 mg Oral Q2H PRN Anson Montgomery II, MD       •  "magnesium hydroxide (MILK OF MAGNESIA) suspension 2400 mg/10mL 10 mL  10 mL Oral Daily PRN Anson Montgomery II, MD       • metFORMIN (GLUCOPHAGE) tablet 500 mg  500 mg Oral BID With Meals Manuel Myers APRN   500 mg at 02/05/21 0826   • multivitamin with minerals 1 tablet  1 tablet Oral Daily Anson Montgomery II, MD   1 tablet at 02/05/21 0826   • risperiDONE (risperDAL) tablet 1 mg  1 mg Oral BID Anson Montgomery II, MD       • traZODone (DESYREL) tablet 50 mg  50 mg Oral Nightly PRN Anson Montgomery II, MD            Physician Progress Notes (last 72 hours) (Notes from 02/02/21 1435 through 02/05/21 1435)      Anson Montgomery II, MD at 02/04/21 1153          2/4/2021    Chief Complaint: psychosis, delusions, paranoia and substance abuse    Subjective:  Patient is a 45 y.o. male who is inpatient on the adult U. He is seen this am in his room. He states he should not be here and that \"several people hear the voice of God and are not considered crazy.\" He states he has been having trouble with his wife and believes she is having an affair or being force to have an affair. He admits that it is \"truly too deep to tell all the details\". He states he thinks his wife was coerced into bringing him here against her will. He also discussed that he has invented a sit down tiller and a scissor lift. 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  He discussed a \"large sum of money and a house left to him\" he cannot find these items and is suppose to be in [place] court.     He refuses to take any medication for his \"mind\" as his mind is not sick. He will continue his blood pressure medication because that is needed.     He denies SI/HI. He denies AVH except for the voice of God.     When seen later in the day, patient's grandiosity is more notable.  He shows no insight into his condition.  Reports he has no issues and no need for any psychiatric medications.  We really reviewed the concerns of his " "situation including his paranoia, he further escalates.  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  When I attempt to ask this further he states that I should call \"Hank\"; ask about Hank he says he will call the president he will want to know that I am here.    We contacted patient's wife, Ngozi, at [phone].  She reports that patient had an approximately 5-day history of decreasing need for sleep in the setting more erratic and bizarre behavior.  Impulses.  Irritable.  Was paranoid and grandiose as well.  Saying savings hold messages from God and angels.  He states that he began to act more erratic during this time as well.  He drove his car at 5 AM to the [facility] [employer] and sat out there for several hours.  He reports that another point tenderness Friday.  He drove to the [facility] and demanded either a helicopter or an airplane but would not specify as to why and does not have an aviation background.  She reports his patient became more erratic she began to be tearful for his wellbeing as well as hers.  She states patient has been living with her ever since around December 20, as patient had moved up to live with his mother for some time before that as there was a separation in their marriage.  We discussed the patient's current situation and concerns for compliance.  She expressed benefit of the idea of long-acting injectable.  I discussed that would not be able to force medicine here but patient may need to go to [facility] for de monalisa process, she voiced understanding.      Objective     Vital Signs    Temp:  [97.3 °F (36.3 °C)-97.6 °F (36.4 °C)] 97.3 °F (36.3 °C)  Heart Rate:  [63-91] 91  Resp:  [16-18] 18  BP: (130-173)/(77-96) 173/80    Physical Exam:   General Appearance: alert, appears stated age and interactive  Hygiene:   fair  Gait & Station: Normal  Musculoskeletal: No tremors or abnormal involuntary " movements    Mental Status Exam:   Cooperation:  Guarded  Eye Contact:  Fair  Psychomotor Behavior:  Appropriate  Mood: Anxious/Nervous  Affect: Blunted  Speech:  Normal rate, rhythm and volume to mildly hyperverbal at times.  Thought Process:  Disorganized  Associations: Goal Directed and Tangential  Thought Content:     Bizarre   Suicidal:  None   Homicidal:  None   Hallucinations:  Auditory   Delusion:  Paranoid, persecution, grandiose  Cognitive Functioning:   Consciousness: awake, alert and oriented  Reliability:  poor  Insight:  Poor  Judgement:  Impaired  Impulse Control:  Impaired    Lab Results (last 24 hours)     Procedure Component Value Units Date/Time    POC Glucose Once [[id number]]  (Abnormal) Collected: 02/04/21 0736    Specimen: Blood Updated: 02/04/21 0801     Glucose 186 mg/dL      Comment: RN NotifiedOperator: [id number] ANJUM SYNDEYMeter ID: [id number]       Glucose, Fasting [[id number]]  (Abnormal) Collected: 02/04/21 0633    Specimen: Blood Updated: 02/04/21 0723     Glucose, Fasting 154 mg/dL     Lipid Panel [[id number]]  (Abnormal) Collected: 02/04/21 0633    Specimen: Blood Updated: 02/04/21 0723     Total Cholesterol 179 mg/dL      Triglycerides 92 mg/dL      HDL Cholesterol 34 mg/dL      LDL Cholesterol  128 mg/dL      VLDL Cholesterol 17 mg/dL      LDL/HDL Ratio 3.72    Narrative:      Cholesterol Reference Ranges  (U.S. Department of Health and Human Services ATP III Classifications)    Desirable          <200 mg/dL  Borderline High    200-239 mg/dL  High Risk          >240 mg/dL      Triglyceride Reference Ranges  (U.S. Department of Health and Human Services ATP III Classifications)    Normal           <150 mg/dL  Borderline High  150-199 mg/dL  High             200-499 mg/dL  Very High        >500 mg/dL    HDL Reference Ranges  (U.S. Department of Health and Human Services ATP III Classifcations)    Low     <40 mg/dl (major risk factor for CHD)  High    >60 mg/dl ('negative' risk  factor for CHD)        LDL Reference Ranges  (U.S. Department of Health and Human Services ATP III Classifcations)    Optimal          <100 mg/dL  Near Optimal     100-129 mg/dL  Borderline High  130-159 mg/dL  High             160-189 mg/dL  Very High        >189 mg/dL        Imaging Results (Last 24 Hours)     ** No results found for the last 24 hours. **          Medicine:   Current Facility-Administered Medications:   •  acetaminophen (TYLENOL) tablet 650 mg, 650 mg, Oral, Q4H PRN, Anson Montgomery II, MD  •  famotidine (PEPCID) tablet 20 mg, 20 mg, Oral, BID PRN, Anson Montgomery II, MD  •  hydrOXYzine pamoate (VISTARIL) capsule 50 mg, 50 mg, Oral, Q6H PRN, Anson Montgomery II, MD  •  loperamide (IMODIUM) capsule 2 mg, 2 mg, Oral, Q2H PRN, Anson Montgomery II, MD  •  magnesium hydroxide (MILK OF MAGNESIA) suspension 2400 mg/10mL 10 mL, 10 mL, Oral, Daily PRN, Anson Montgomery II, MD  •  risperiDONE (risperDAL) tablet 1 mg, 1 mg, Oral, Nightly, Anson Montgomery II, MD  •  traZODone (DESYREL) tablet 50 mg, 50 mg, Oral, Nightly PRN, Anson Montgomery II, MD    Diagnoses/Assessment:     Bipolar I disorder, current or most recent episode manic, with psychotic features (CMS/HCC)    Acute psychosis (CMS/HCC)    Cannabis use disorder, moderate, dependence (CMS/HCC)    Rule In / Out Substance-induced psychotic disorder       Treatment Plan:    1) Will continue care for the patient on the behavioral health unit at [facility] to ensure patient safety.  2) Will continue to provide treatment with the unit milieu, activities, therapies and psychopharmacological management.  3) Patient to be placed on or continued on  Q15 minute checks  and Elopement and Aggression precautions.  4) Pertinent medical issues:   --HTN  --Type 2 DM  --GERD  5) Will order following labs: none  6) Will make the following medication changes:   --Start Vitamin Supplementation  --Cont Risperdal 1 mg  QHS; add 1 mg in the morning to get increased number of times medicines being offered to aid in compliance  7) Will continue discharge planning as appropriate for patient.  8) Psychotherapy provided for less than 16 minutes.    Treatment plan and medication risks and benefits discussed with: Patient and wife    Anson Montgomery II, MD  02/04/21  21:16 CST      Psychotherapy Note  --Total Psychotherapy Time: 18 minutes  --Participants: Patient, myself, LATONIA Tubbs  --Current Clinical Status: Paranoid  --Focus of Therapeutic Encounter:  rapport and insight  --Intervention Type: Supportive, CBT/cognitive, Psycho-Educational and Insight Focused  --Therapy notes: I provided reflective listening, supportive therapy, reflection, and allowed them to express affect in therapy course.  Cognitive behavioral therapy targeting schema burden, distortions - identifying and challenging these.  Educational regarding his own condition and treatment.  Insight increase awareness about his situation and dysfunctional behaviors.  Also focusing on development of furthering therapeutic alliance.  --DX: as above  --Plan: Continue to work on developing & strengthening coping skills; correcting maladaptive schema; work on insight and work along rapport  --Post therapy status: improving affect      Anson Montgomery II, MD  02/04/21 @ 21:16 CST        Electronically signed by Anson Montgomery II, MD at 02/04/21 0553

## 2021-02-05 NOTE — NURSING NOTE
"Patient daughter called asking for patient and info on patient, she did not have the code and we did not tell her whether he was up here or not. This nurse informed patient that daughter did call but we did not tell her anything and if he wanted her to know he could call and give her his code. He did call and give her his code and said we could talk to her. She called back wanting to know why he was up here. Informed her that he was on a 72 hour hold for acute psychosis. She did state that \"lately I have been noticing him talking a little crazy\"   "

## 2021-02-05 NOTE — PLAN OF CARE
Goal Outcome Evaluation:  Plan of Care Reviewed With: patient  Progress: no change  Outcome Summary: pt has remained in activities, has not had any behaviors but has had some disorganized behavior, has been in shower at least 4 times in one hour, and has had shower running when on toilet.  pt has not made any threats to self or others.

## 2021-02-05 NOTE — NURSING NOTE
"Behavior   Note any precipitants to event or behavior   Describe level and action of any aggressive behavior or speech and associated interventions.     Anxiety: Patient denies at this time  Depression: Patient denies at this time  Pain  0  AVH   no  S/I   no  Plan  no  H/I   no  Plan  no    Affect   flat      Note: Patient denies SI/HI/AVH. He has been calm and interacting with peers. He had a snack but refused group activity. He took all of his normal home medications tonight and also [facility] but he still refused to take the Risperidone. He talked to his daughter tonight on the phone and that did put him in a good mood. 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 He seems suspicious and mistrustful.   His blood pressure has been elevated tonight, mainly the DBP. He took his home bp medication tonight, will continue to monitor bp.  Will monitor patient behavior and provide safe environment.       Intervention    PRN medication utilized:  no    Instructed in medication usage and effects  Medications administered as ordered  Encouraged to verbalize needs      Response    Verbalized understanding   Did patient take medications as ordered yes   Did patient interact with assessment?  yes     Plan    Will monitor for safety  Will monitor every 15 minutes as ordered  Will evaluate and promote the plan of care    Last BM:  unknown date  (Please chart in I/O as well)    "

## 2021-02-05 NOTE — PROGRESS NOTES
Kuldip was seen in treatment team this date to discuss his treatment progress. Kuldip continues to present with considerable disorganization, delusional thought process and is unable to be receptive to information provided to him regarding his current symptoms/mental health. Collectively treatment team agrees Kuldip has considerably deteriorated since his arrival on the unit, 2/3/2021. After meeting with multiple members of the treatment team and treatment team as a whole, Kuldip still continues to decline use of medications to assist with his symptoms. Kuldip was made aware that a referral to [facility] will be made on his behalf given current symptoms, symptoms upon arrival and continued deterioration. Undersigned completed AOC-034, AOC-710 and AOC-711. All paperwork was emailed to . [facility] notified of court order awaiting 's signature.

## 2021-02-05 NOTE — NURSING NOTE
"Behavior   Note any precipitants to event or behavior   Describe level and action of any aggressive behavior or speech and associated interventions.     Anxiety: Excess Worry  Depression: Patient denies at this time  Pain  0  AVH   no, patient denies  S/I   no  Plan  no  H/I   no  Plan  no    Affect   euthymic/normal      Note:pt up and involved milieu, pleasant and talkative.  Pt has refused labetalol and risperdal, states he will continue to refuse those meds and states that he wants his xanax back.  Pt states,\"i was doing fine on it, wasn't taking but about 15 a month and it helped me with my writing, I was staying at home taking care of the kids, went to my moms for a short while and stayed there but when im home, it was good, I was writing and getting it out, but no one in my home was jiving with me.  So no, I will not take those medications, and that is just me and that is not going to change.\"  Pt has attended groups.        Intervention    PRN medication utilized:  no    Instructed in medication usage and effects  Medications administered as ordered  Encouraged to verbalize needs      Response    Verbalized understanding   Did patient take medications as ordered no  Did patient interact with assessment?  yes     Plan    Will monitor for safety  Will monitor every 15 minutes as ordered  Will evaluate and promote the plan of care    Last BM:  unknown date  (Please chart in I/O as well)  "

## 2021-02-05 NOTE — NURSING NOTE
Checked blood pressure manually and it is 142/100, called hospitalist Dr. Griffin and he said just to monitor at this time if patient is asymptomatic. Notified that he is experiencing some anxiety but refused to take vistaril for it, and he denies having headache or any other symptoms and informed him of the b/p meds that he will receive this am. He says to continue to monitor him at this time.

## 2021-02-05 NOTE — PLAN OF CARE
Goal Outcome Evaluation:  Plan of Care Reviewed With: patient  Progress: no change  Outcome Summary: patient has been sleeping for 4 hours so far tonight and is still sleeping currently.

## 2021-02-06 VITALS
WEIGHT: 294.7 LBS | TEMPERATURE: 98.7 F | HEART RATE: 65 BPM | SYSTOLIC BLOOD PRESSURE: 170 MMHG | DIASTOLIC BLOOD PRESSURE: 98 MMHG | OXYGEN SATURATION: 96 % | RESPIRATION RATE: 20 BRPM | BODY MASS INDEX: 39.91 KG/M2 | HEIGHT: 72 IN

## 2021-02-06 PROBLEM — Z91.148 NON COMPLIANCE W MEDICATION REGIMEN: Status: ACTIVE | Noted: 2021-02-06

## 2021-02-06 PROBLEM — Z91.14 NON COMPLIANCE W MEDICATION REGIMEN: Status: ACTIVE | Noted: 2021-02-06

## 2021-02-06 PROBLEM — F29 PSYCHOSIS: Status: RESOLVED | Noted: 2018-05-09 | Resolved: 2021-02-06

## 2021-02-06 PROCEDURE — 99239 HOSP IP/OBS DSCHRG MGMT >30: CPT | Performed by: PSYCHIATRY & NEUROLOGY

## 2021-02-06 PROCEDURE — 82962 GLUCOSE BLOOD TEST: CPT

## 2021-02-06 RX ORDER — MELATONIN
2000
Start: 2021-02-06 | End: 2021-03-01

## 2021-02-06 RX ORDER — MULTIPLE VITAMINS W/ MINERALS TAB 9MG-400MCG
1 TAB ORAL DAILY
Start: 2021-02-06

## 2021-02-06 RX ORDER — RISPERIDONE 1 MG/1
1 TABLET ORAL 2 TIMES DAILY
Start: 2021-02-06 | End: 2021-03-01

## 2021-02-06 RX ADMIN — METFORMIN HYDROCHLORIDE 500 MG: 500 TABLET ORAL at 07:58

## 2021-02-06 NOTE — PLAN OF CARE
Goal Outcome Evaluation:  Plan of Care Reviewed With: patient  Progress: no change  Outcome Summary: appears to have slept approximately 7 hours so far this shift and is currently sleeping

## 2021-02-06 NOTE — NURSING NOTE
"Behavior   Note any precipitants to event or behavior   Describe level and action of any aggressive behavior or speech and associated interventions.     Anxiety: Excess Worry  Depression: Patient denies at this time  Pain  0  AVH   no  S/I   no  Plan  no  H/I   no  Plan  no    Affect   euthymic/normal      Note: patient interviewed in hallway, pt is calm and pleasant, denies SI/HI/AVH but when asked about hearing things pt states- \"I'm not but I know what to do when that happens. Listen.\" pt refuses all night time medications except lipitor, educated pt on importance of taking medications and patient states- \"I'm not taking something that doesn't make my body feel right. I know my body.\"       Intervention    PRN medication utilized:  no    Instructed in medication usage and effects  Medications administered as ordered  Encouraged to verbalize needs      Response    Verbalized understanding   Did patient take medications as ordered no  Did patient interact with assessment?  yes     Plan    Will monitor for safety  Will monitor every 15 minutes as ordered  Will evaluate and promote the plan of care    Last BM:  unknown date  (Please chart in I/O as well)    "

## 2021-02-06 NOTE — NURSING NOTE
"Pt discharged to police custody for transport to [facility] via court order for higher level of care.  Pt received belongings from safe and personal belongings on unit which remain in police custody for transport purposes.  Report was called to [place] at [facility].  Pt is alert and oriented x 4, remains psychotic, very paranoid and grandiose, states he fears that police will \"take me somewhere else, or I wont get there at all, or ill get charges put on me.\"  Reassurance provided that patient is safe and pt then chose to go peacefully.  Pt vitals are stable, and pt is dressed appropriately for weather.  Pt wife has been notified.    "

## 2021-02-06 NOTE — PROGRESS NOTES
"2/6/2021    Chief Complaint: psychosis, delusions, paranoia and substance abuse    Subjective:  Mr Castaneda remains profoundly psychotic today.      He is seen primarily at tx team.  He continues to refuse any medications.  Shows no insight into his situation.     [place] be \"[place]\" [place] and not doing such will be \"unconstitutional.\"  He espouses nebulous, persecution from law enforcement and others, but will not further state why.  He voices receiving messages from God and angels.    He is hyperverbal.      We have moved forward with transfer to [facility] for the de monalisa process.  See CARLOS Chaidez's notes.      I performed a doc to doc with Dr. Holt who has accepted pt for tomorrow morning (Saturday).      Objective     Vital Signs    Temp:  [98.5 °F (36.9 °C)] 98.5 °F (36.9 °C)  Heart Rate:  [78] 78  Resp:  [20] 20  BP: (152)/(102) 152/102    Physical Exam:   General Appearance: alert, appears stated age and interactive  Hygiene:   fair  Gait & Station: Normal  Musculoskeletal: No tremors or abnormal involuntary movements    Mental Status Exam:   Cooperation:  Guarded  Eye Contact:  Fair  Psychomotor Behavior:  Appropriate  Mood: \"fine\"  Affect: Blunted  Speech:   Rapid overall, and hyperverbal at times.  Thought Process:  Disorganized  Associations: Goal Directed and Tangential  Thought Content:     Bizarre   Suicidal:  None   Homicidal:  None   Hallucinations:  Auditory   Delusion:  Paranoid, persecution, grandiose  Cognitive Functioning:   Consciousness: awake, alert and oriented  Reliability:  poor  Insight:  Poor  Judgement:  Impaired  Impulse Control:  Impaired    Lab Results (last 24 hours)     ** No results found for the last 24 hours. **        Imaging Results (Last 24 Hours)     ** No results found for the last 24 hours. **          Medicine:   Current Facility-Administered Medications:   •  acetaminophen (TYLENOL) tablet 650 mg, 650 mg, Oral, Q4H PRN, Anson Montgomery II, " MD  •  atorvastatin (LIPITOR) tablet 20 mg, 20 mg, Oral, Nightly, Manuel Myers APRN, 20 mg at 02/05/21 2025  •  cholecalciferol (VITAMIN D3) tablet 2,000 Units, 2,000 Units, Oral, Daily With Dinner, Anson Montgomery II, MD, 2,000 Units at 02/05/21 1813  •  dextrose (D50W) 25 g/ 50mL Intravenous Solution 25 g, 25 g, Intravenous, Q15 Min PRN, Manuel Myers APRN  •  dextrose (GLUTOSE) oral gel 15 g, 15 g, Oral, Q15 Min PRN, Manuel Myers APRN  •  enalapril (VASOTEC) tablet 20 mg, 20 mg, Oral, Q24H, Manuel Myers APRN, 20 mg at 02/05/21 0825  •  famotidine (PEPCID) tablet 20 mg, 20 mg, Oral, BID PRN, Anson Montgomery II, MD  •  glucagon (human recombinant) (GLUCAGEN DIAGNOSTIC) injection 1 mg, 1 mg, Subcutaneous, Q15 Min PRN, Manuel Myers APRN  •  hydrOXYzine pamoate (VISTARIL) capsule 50 mg, 50 mg, Oral, Q6H PRN, Anson Montgomery II, MD  •  insulin aspart (novoLOG) injection 0-7 Units, 0-7 Units, Subcutaneous, TID AC, Manuel Myers APRN  •  labetalol (NORMODYNE) tablet 200 mg, 200 mg, Oral, BID, Manuel Myers APRN, 200 mg at 02/04/21 2027  •  loperamide (IMODIUM) capsule 2 mg, 2 mg, Oral, Q2H PRN, Anson Montgomery II, MD  •  magnesium hydroxide (MILK OF MAGNESIA) suspension 2400 mg/10mL 10 mL, 10 mL, Oral, Daily PRN, Anson Montgomery II, MD  •  metFORMIN (GLUCOPHAGE) tablet 500 mg, 500 mg, Oral, BID With Meals, Manuel Myers APRN, 500 mg at 02/05/21 1813  •  multivitamin with minerals 1 tablet, 1 tablet, Oral, Daily, Anson Montgomery II, MD, 1 tablet at 02/05/21 0826  •  risperiDONE (risperDAL) tablet 1 mg, 1 mg, Oral, BID, Anson Montgomery II, MD  •  traZODone (DESYREL) tablet 50 mg, 50 mg, Oral, Nightly PRN, Anson Montgomery II, MD    Diagnoses/Assessment:     Bipolar I disorder, current or most recent episode manic, with psychotic features (CMS/HCC)    Acute psychosis (CMS/HCC)     Non compliance w medication regimen    Essential hypertension    Hyperlipidemia    Type II diabetes mellitus with complication, uncontrolled (CMS/HCC)    Cannabis use disorder, moderate, dependence (CMS/HCC)    Rule In / Out Substance-induced psychotic disorder       Treatment Plan:    1) Will continue care for the patient on the behavioral health unit at [facility] to ensure patient safety.  2) Will continue to provide treatment with the unit milieu, activities, therapies and psychopharmacological management.  3) Patient to be placed on or continued on  Q15 minute checks  and Elopement and Aggression precautions.  4) Pertinent medical issues:   --HTN: stable; cont anti hypertensive meds  --Type 2 DM: cont SSI, metformin  --GERD: stable  5) Will order following labs: none  6) Will make the following medication changes:   --Start Vitamin Supplementation  --Cont Risperdal 1 mg QHS; add 1 mg in the morning to get increased number of times medicines being offered to aid in compliance  --Plan for transfer to [facility] for de monalisa process  7) Will continue discharge planning as appropriate for patient.  8) Psychotherapy provided for less than 16 minutes.    Treatment plan and medication risks and benefits discussed with: Patient and tx team    >35 min spent on case, including face to face care, coordination of care, discussion with [facility].    Anson Montgomery II, MD  02/06/21  07:22 CST

## 2021-02-06 NOTE — DISCHARGE SUMMARY
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 dependence (CMS/[facility], located within [facility])    Rule In / Out Substance-induced psychotic disorder         Discharge Medications:      Your medication list      START taking these medications      Instructions Last Dose Given Next Dose Due   cholecalciferol 25 MCG (1000 UT) tablet  Commonly known as: VITAMIN D3      Take 2 tablets by mouth Daily With Dinner. Indications: Vitamin D Deficiency       multivitamin with minerals tablet tablet      Take 1 tablet by mouth Daily. Indications: supplement       risperiDONE 1 MG tablet  Commonly known as: risperDAL      Take 1 tablet by mouth 2 (Two) Times a Day. Indications: Manic Phase of Manic-Depression          CONTINUE taking these medications      Instructions Last Dose Given Next Dose Due   atorvastatin 20 MG tablet  Commonly known as: LIPITOR      Take 1 tablet by mouth Every Night.       enalapril 20 MG tablet  Commonly known as: VASOTEC      Take 1 tablet by mouth Daily for 30 days.       labetalol 200 MG tablet  Commonly known as: NORMODYNE      Take 1 tablet by mouth 2 (Two) Times a Day for 30 days.       metFORMIN 500 MG tablet  Commonly known as: Glucophage      Take 1 tablet by mouth 2 (Two) Times a Day With Meals for 30 days.          STOP taking these medications    ALPRAZolam 1 MG tablet  Commonly known as: XANAX        vitamin D 1.25 MG (95694 UT) capsule capsule  Commonly known as: ERGOCALCIFEROL              Where to Get Your  Medications      Information about where to get these medications is not yet available    Ask your nurse or doctor about these medications  · cholecalciferol 25 MCG (1000 UT) tablet  · multivitamin with minerals tablet tablet  · risperiDONE 1 MG tablet         Justification for multiple antipsychotic medications at discharge:    --Not Applicable.  Medication for smoking cessation:   --n/a, [facility] transfer  Medication for substance abuse:   --Patient has a substance use diagnosis but there is no FDA indicated medication to treat this diagnosis.    Discharge Diet:   As per pre-admission.  Activity Level:   As per pre-admission.    Disposition: Patient was discharged to [facility].    Outpatient Follow-Up:  Follow-up Information     Aura Carrillo MD .    Specialties: Family Medicine, Emergency Medicine  Contact information:  [address] [place] [address] [address] [place] [address] [phone]                   --All follow-up via [facility]      Time Spent: More than 30 minutes.  I spent 32 minutes on this discharge activity which included: face to face encounter with the patient, reviewing the data in the system, coordination of the care with the nursing staff as well as consultants, documentation, and entering orders.      Anson Montgomery II, MD  02/08/21  08:42 CST        Addendum: clarification to hospital course.     Anson Montgomery II, MD  02/08/21 @ 10:05 AM CST

## 2021-02-07 LAB
GLUCOSE BLDC GLUCOMTR-MCNC: 104 MG/DL (ref 70–130)
GLUCOSE BLDC GLUCOMTR-MCNC: 113 MG/DL (ref 70–130)
GLUCOSE BLDC GLUCOMTR-MCNC: 117 MG/DL (ref 70–130)
GLUCOSE BLDC GLUCOMTR-MCNC: 119 MG/DL (ref 70–130)

## 2021-03-01 ENCOUNTER — OFFICE VISIT (OUTPATIENT)
Dept: FAMILY MEDICINE CLINIC | Facility: CLINIC | Age: 46
End: 2021-03-01

## 2021-03-01 DIAGNOSIS — I10 ESSENTIAL HYPERTENSION: Primary | ICD-10-CM

## 2021-03-01 DIAGNOSIS — F31.2 BIPOLAR I DISORDER, CURRENT OR MOST RECENT EPISODE MANIC, WITH PSYCHOTIC FEATURES (HCC): ICD-10-CM

## 2021-03-01 DIAGNOSIS — IMO0002 TYPE II DIABETES MELLITUS WITH COMPLICATION, UNCONTROLLED: ICD-10-CM

## 2021-03-01 PROCEDURE — 99214 OFFICE O/P EST MOD 30 MIN: CPT | Performed by: FAMILY MEDICINE

## 2021-03-01 RX ORDER — LISINOPRIL 20 MG/1
20 TABLET ORAL DAILY
COMMUNITY
End: 2021-04-12 | Stop reason: SDUPTHER

## 2021-03-01 RX ORDER — RISPERIDONE 3 MG/1
3 TABLET ORAL 2 TIMES DAILY
COMMUNITY
End: 2021-03-15 | Stop reason: SDUPTHER

## 2021-03-01 RX ORDER — BUSPIRONE HYDROCHLORIDE 10 MG/1
10 TABLET ORAL 2 TIMES DAILY
COMMUNITY
End: 2021-05-06

## 2021-03-01 RX ORDER — ALPRAZOLAM 1 MG/1
TABLET ORAL
COMMUNITY
Start: 2020-12-22 | End: 2021-03-01

## 2021-03-01 RX ORDER — CLONIDINE HYDROCHLORIDE 0.1 MG/1
0.1 TABLET ORAL 3 TIMES DAILY
COMMUNITY
End: 2021-03-15 | Stop reason: SDUPTHER

## 2021-03-01 RX ORDER — DULAGLUTIDE 0.75 MG/.5ML
0.75 INJECTION, SOLUTION SUBCUTANEOUS WEEKLY
Qty: 2 ML | Refills: 5 | Status: SHIPPED | OUTPATIENT
Start: 2021-03-01 | End: 2021-09-01 | Stop reason: SDUPTHER

## 2021-03-01 NOTE — PROGRESS NOTES
Subjective   Kuldip Bossman Castaneda is a 45 y.o. male.   Here in the office today for a few concerns.    Recently spent some time on the psychiatric unit and was transferred to [facility].  I have reviewed his discharge summaries.  He was hospitalized basically for gross psychosis, delusions, profound paranoia, and substance abuse.  Patient says he is here today basically for follow-up and some refills.  Continues to have some unusual thoughts and behaviors.  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  He also said that [place] because he needed to talk to the general, who also wanted to talk to him, but was detained.  He says he was recently arrested for impersonating a  and had to spend the night in long-term but says he was not impersonating a .  He still does not like the idea of taking a lot of medications.  He continues to want to work on his weight and lifestyle and today I expressed to him my concerns and desire for him to stay on the medicines for hypertension and diabetes and he verbalizes understanding right now.  He is still taking the Risperdal as he says he promised his wife he would do this.    History of Present Illness    The following portions of the patient's history were reviewed and updated as appropriate: allergies, current medications, past family history, past medical history, past social history, past surgical history and problem list.    Review of Systems   HENT: Negative for sneezing and trouble swallowing.    Eyes: Negative for visual disturbance.   Respiratory: Negative for chest tightness and wheezing.    Cardiovascular: Negative for leg swelling.   Gastrointestinal: Positive for abdominal pain.   Genitourinary: Negative for urgency.   Musculoskeletal: Negative for back pain.   Neurological: Negative for  dizziness.   Psychiatric/Behavioral: Negative for behavioral problems. The patient is not nervous/anxious.             All other systems reviewed and are negative.      Objective    Vitals:    03/01/21 0800   PainSc: 0-No pain     There is no height or weight on file to calculate BMI.    Physical Exam   Constitutional: He appears well-developed. He is cooperative. He does not appear ill.   HENT:   Head: Normocephalic and atraumatic.   Nose: Nose normal.   Eyes: Pupils are equal, round, and reactive to light. Conjunctivae are normal. Right eye exhibits no discharge. Left eye exhibits no discharge. No scleral icterus.   Neck: Trachea normal, normal range of motion and phonation normal. Neck supple. No thyromegaly present.       Cardiovascular: Normal rate, regular rhythm and normal heart sounds. Exam reveals no gallop and no friction rub.   No murmur heard.  Pulmonary/Chest: Effort normal and breath sounds normal. No respiratory distress. He has no wheezes. He has no rales.   Abdominal: Soft. Normal appearance and bowel sounds are normal. He exhibits no distension. There is no abdominal tenderness. There is no rebound and no guarding.   Musculoskeletal: Normal range of motion.      Lumbar back: Pain: chronic, rates pain a 5.     Vascular Status -  His right foot exhibits abnormal foot vasculature . His right foot exhibits no edema. His left foot exhibits abnormal foot vasculature . His left foot exhibits no edema.  Lymphadenopathy:     He has no cervical adenopathy.   Neurological: No cranial nerve deficit.   Skin: Skin is warm and dry.   Nursing note and vitals reviewed.    Current outpatient and discharge medications have been reconciled for the patient.  Reviewed by: Aura Carrillo MD    Assessment/Plan   Diagnoses and all orders for this visit:    1. Essential hypertension (Primary)    2. Type II diabetes mellitus with complication, uncontrolled (CMS/[facility])    3. Bipolar I disorder, current or most recent episode  manic, with psychotic features (CMS/HCC)    Other orders  -     Dulaglutide (Trulicity) 0.75 MG/0.5ML solution pen-injector; Inject 0.75 mg (1 pen) under the skin into the appropriate area as directed 1 (One) Time Per Week.  Dispense: 2 mL; Refill: 5    He will continue on Risperdal.  I do not think he is currently seeing a counselor.  We will continue to try to follow    I have concerns that he will continue compliance with his medicine but strongly encouraged him to continue with current meds for diabetes and testing of blood sugars at least daily, and current antihypertensive with regular monitoring.  Will need labs at next visit, asked to return in 3 months          This document has been electronically signed by Aura Carrillo MD on March 1, 2021 08:20 CST

## 2021-03-15 RX ORDER — ATORVASTATIN CALCIUM 20 MG/1
20 TABLET, FILM COATED ORAL NIGHTLY
Qty: 90 TABLET | Refills: 2 | Status: SHIPPED | OUTPATIENT
Start: 2021-03-15 | End: 2022-07-20 | Stop reason: SDUPTHER

## 2021-03-15 RX ORDER — RISPERIDONE 3 MG/1
3 TABLET ORAL 2 TIMES DAILY
Qty: 180 TABLET | Refills: 2 | Status: SHIPPED | OUTPATIENT
Start: 2021-03-15 | End: 2021-03-17

## 2021-03-15 RX ORDER — CLONIDINE HYDROCHLORIDE 0.1 MG/1
0.1 TABLET ORAL 3 TIMES DAILY
Qty: 270 TABLET | Refills: 2 | Status: SHIPPED | OUTPATIENT
Start: 2021-03-15 | End: 2022-09-02 | Stop reason: SDUPTHER

## 2021-03-17 ENCOUNTER — OFFICE VISIT (OUTPATIENT)
Dept: FAMILY MEDICINE CLINIC | Facility: CLINIC | Age: 46
End: 2021-03-17

## 2021-03-17 VITALS — HEIGHT: 72 IN | WEIGHT: 285 LBS | BODY MASS INDEX: 38.6 KG/M2

## 2021-03-17 DIAGNOSIS — F25.9 SCHIZOAFFECTIVE DISORDER, UNSPECIFIED TYPE (HCC): Primary | ICD-10-CM

## 2021-03-17 PROCEDURE — 99214 OFFICE O/P EST MOD 30 MIN: CPT | Performed by: FAMILY MEDICINE

## 2021-03-17 RX ORDER — BENZTROPINE MESYLATE 1 MG/1
1 TABLET ORAL 2 TIMES DAILY
Qty: 60 TABLET | Refills: 5 | Status: SHIPPED | OUTPATIENT
Start: 2021-03-17 | End: 2021-04-07

## 2021-03-17 RX ORDER — CHLORAL HYDRATE 500 MG
1 CAPSULE ORAL
COMMUNITY

## 2021-03-17 RX ORDER — DULAGLUTIDE 0.75 MG/.5ML
INJECTION, SOLUTION SUBCUTANEOUS
COMMUNITY
Start: 2021-03-01 | End: 2021-03-17 | Stop reason: SDUPTHER

## 2021-03-17 RX ORDER — RISPERIDONE 3 MG/1
TABLET ORAL
COMMUNITY
Start: 2021-03-15 | End: 2021-03-17 | Stop reason: SDUPTHER

## 2021-03-17 RX ORDER — LABETALOL 200 MG/1
TABLET, FILM COATED ORAL
COMMUNITY
Start: 2021-02-03 | End: 2021-04-07

## 2021-03-17 NOTE — PROGRESS NOTES
"Subjective   Kuldip Bossman Castaneda is a 45 y.o. male.   Here in the office today for a few concerns.  Patient has had issues with psychosis or delusional type behavior for quite some time now.  He was hospitalized a few times in the past couple of years on the psychiatric unit.  He has had diagnoses of schizoaffective disorder and psychotic disorder and bipolar disorder with psychoses in the past.  He also has been diagnosed with PTSD.  Today he says he would really just like to know what he has and what is going on.  He continues to have some delusional thoughts and he says that he feels \"crazy.\"  He says that his wife told him that she would like to be able to have a \"normal\" conversation with him at some point.  He was on Risperdal most recently but this caused him to have more anxiety.  A lot of the medicines tried for the psychosis have caused increased weight gain which has been a source of anxiety for him.  He says that he is \"working on himself\" and wants to be healthier.  He continues to persevere and have intrusive thoughts.  He wanted to see about having his thyroid checked.  We did labs just a couple months ago and his T4 and TSH were basically normal.  He is not on Synthroid but does not clinically show signs of hypothyroidism.    He has stopped all of his other meds as well and it has been very difficult to convince him to take blood pressure medicine and diabetes medicine even though we tell him how important these are.    History of Present Illness    The following portions of the patient's history were reviewed and updated as appropriate: allergies, current medications, past family history, past medical history, past social history, past surgical history and problem list.    Review of Systems   HENT: Negative for sneezing and trouble swallowing.    Eyes: Negative for visual disturbance.   Respiratory: Negative for chest tightness and wheezing.    Cardiovascular: Negative for leg swelling. " "  Gastrointestinal: Positive for abdominal pain.   Genitourinary: Negative for urgency.   Musculoskeletal: Negative for back pain.   Neurological: Negative for dizziness.   Psychiatric/Behavioral: Positive for hallucinations.            All other systems reviewed and are negative.      Objective    Vitals:    03/17/21 0837   Weight: 129 kg (285 lb)   Height: 182.9 cm (72\")   PainSc: 0-No pain     Body mass index is 38.65 kg/m².    Physical Exam   Constitutional: He appears well-developed. He is cooperative. He does not appear ill.   HENT:   Head: Normocephalic and atraumatic.   Nose: Nose normal.   Eyes: Pupils are equal, round, and reactive to light. Conjunctivae are normal. Right eye exhibits no discharge. Left eye exhibits no discharge. No scleral icterus.   Neck: Trachea normal and phonation normal. No thyromegaly present.       Cardiovascular: Normal rate, regular rhythm and normal heart sounds. Exam reveals no gallop and no friction rub.   No murmur heard.  Pulmonary/Chest: Effort normal and breath sounds normal. No respiratory distress. He has no wheezes. He has no rales.   Abdominal: Soft. Normal appearance and bowel sounds are normal. He exhibits no distension. There is no abdominal tenderness. There is no rebound and no guarding.   Musculoskeletal: Normal range of motion.      Lumbar back: Pain: chronic, rates pain a 5.     Vascular Status -  His right foot exhibits abnormal foot vasculature . His right foot exhibits no edema. His left foot exhibits abnormal foot vasculature . His left foot exhibits no edema.  Lymphadenopathy:     He has no cervical adenopathy.   Neurological: No cranial nerve deficit.   Skin: Skin is warm and dry.   Psychiatric:   Patient has a very flat affect.  He has perseverance of ideas, circular speech, repeats himself often.  Overall, he makes repeated very odd statements.    Nursing note and vitals reviewed.    Assessment/Plan   Diagnoses and all orders for this visit:    1. " Schizoaffective disorder, unspecified type (CMS/HCC) (Primary)    Other orders  -     benztropine (COGENTIN) 1 MG tablet; Take 1 tablet by mouth 2 (Two) Times a Day.  Dispense: 60 tablet; Refill: 5    Patient was most recently on Cogentin and I will refill this for him today and encouraged him to continue with medicine as well as going back on the medicines at home for his other medical issues.  We had a very long monica discussion today about his diagnosis and tried to explain to him about schizoaffective disorder or psychosis.  It is unclear what exactly his diagnosis is and it has been very difficult to get him to follow-up with psychiatry as frequently he gets out of the hospital and does not really feel that there is anything wrong with him or has a need to follow-up.  We will continue to follow closely, and asked him to recheck with me in a month          This document has been electronically signed by Aura Carrillo MD on March 17, 2021 09:24 CDT

## 2021-03-18 ENCOUNTER — IMMUNIZATION (OUTPATIENT)
Dept: VACCINE CLINIC | Facility: HOSPITAL | Age: 46
End: 2021-03-18

## 2021-03-18 PROCEDURE — 91300 HC SARSCOV02 VAC 30MCG/0.3ML IM: CPT | Performed by: THORACIC SURGERY (CARDIOTHORACIC VASCULAR SURGERY)

## 2021-03-18 PROCEDURE — 0001A: CPT | Performed by: THORACIC SURGERY (CARDIOTHORACIC VASCULAR SURGERY)

## 2021-04-07 ENCOUNTER — OFFICE VISIT (OUTPATIENT)
Dept: FAMILY MEDICINE CLINIC | Facility: CLINIC | Age: 46
End: 2021-04-07

## 2021-04-07 VITALS — SYSTOLIC BLOOD PRESSURE: 170 MMHG | DIASTOLIC BLOOD PRESSURE: 120 MMHG

## 2021-04-07 DIAGNOSIS — I10 ESSENTIAL HYPERTENSION: ICD-10-CM

## 2021-04-07 DIAGNOSIS — F25.9 SCHIZOAFFECTIVE DISORDER, UNSPECIFIED TYPE (HCC): Primary | ICD-10-CM

## 2021-04-07 DIAGNOSIS — R41.3 MEMORY LOSS: ICD-10-CM

## 2021-04-07 DIAGNOSIS — R41.3 MEMORY LOSS: Primary | ICD-10-CM

## 2021-04-07 PROCEDURE — 99214 OFFICE O/P EST MOD 30 MIN: CPT | Performed by: FAMILY MEDICINE

## 2021-04-07 RX ORDER — METOPROLOL SUCCINATE 100 MG/1
100 TABLET, EXTENDED RELEASE ORAL DAILY
Qty: 30 TABLET | Refills: 5 | Status: SHIPPED | OUTPATIENT
Start: 2021-04-07

## 2021-04-07 NOTE — PROGRESS NOTES
"Subjective   Kuldip Bossman Castaneda is a 45 y.o. male.   Here in the office today for a few concerns.  Patient has been hospitalized with psychosis and what has been called schizoaffective disorder.  He tells me today that his father had paranoid schizophrenia.  The patient continues to have delusions of grandeur, grandiose thinking.  For example, he tells us today that he is moving to another country soon, that he has found out how to cure HIV by using bleach with the dialysis machine, and that he has \"prophecies.\"  He is adamant about not having psychosis or schizophrenia.  He is very paranoid, feels like people are after him or are persecuting him.  He states that there was a period of time recently for 3 days where he has no memory.  He says that his body was moved to his close were changed but he does not recall this happening and that he chewed off pieces of his tongue during this time and the pieces of it were missing.  Evidently there is some marital stress as a result of all this as the patient says that he may be having to go live with his mother.  Is been difficult to get him to take his medications.  We did review his DNA results today for genetic susceptibility to medications and he indicates to me that he will try something on that list.    History of Present Illness    The following portions of the patient's history were reviewed and updated as appropriate: allergies, current medications, past family history, past medical history, past social history, past surgical history and problem list.    Review of Systems   HENT: Negative for sneezing and trouble swallowing.    Eyes: Negative for visual disturbance.   Respiratory: Negative for chest tightness and wheezing.    Cardiovascular: Negative for leg swelling.   Gastrointestinal: Positive for abdominal pain.   Genitourinary: Negative for urgency.   Musculoskeletal: Negative for back pain.   Neurological: Negative for dizziness.   Psychiatric/Behavioral: " Positive for hallucinations.            All other systems reviewed and are negative.      Objective    Vitals:    04/07/21 0959   BP: (!) 170/120   PainSc: 0-No pain     There is no height or weight on file to calculate BMI.    Physical Exam   Constitutional: He appears well-developed. He is cooperative. He does not appear ill.   HENT:   Head: Normocephalic and atraumatic.   Nose: Nose normal.   Eyes: Pupils are equal, round, and reactive to light. Conjunctivae are normal. Right eye exhibits no discharge. Left eye exhibits no discharge. No scleral icterus.   Neck: Trachea normal and phonation normal. No thyromegaly present.       Cardiovascular: Normal rate, regular rhythm and normal heart sounds. Exam reveals no gallop and no friction rub.   No murmur heard.  Pulmonary/Chest: Effort normal and breath sounds normal. No respiratory distress. He has no wheezes. He has no rales.   Abdominal: Soft. Normal appearance and bowel sounds are normal. He exhibits no distension. There is no abdominal tenderness. There is no rebound and no guarding.   Musculoskeletal: Normal range of motion.      Lumbar back: Pain: chronic, rates pain a 5.     Vascular Status -  His right foot exhibits abnormal foot vasculature . His right foot exhibits no edema. His left foot exhibits abnormal foot vasculature . His left foot exhibits no edema.  Lymphadenopathy:     He has no cervical adenopathy.   Neurological: No cranial nerve deficit.   Skin: Skin is warm and dry.   Psychiatric:   Patient is oriented to place and person but shows consistent patterns of illogical and irrational thinking, delusions of grandeur and persecution.   Nursing note and vitals reviewed.    Assessment/Plan   Diagnoses and all orders for this visit:    1. Schizoaffective disorder, unspecified type (CMS/HCC) (Primary)    2. Memory loss  -     MRI Brain Without Contrast; Future    3. Essential hypertension  -     MRI Brain Without Contrast; Future    Other orders  -      metoprolol succinate XL (Toprol XL) 100 MG 24 hr tablet; Take 1 tablet by mouth Daily.  Dispense: 30 tablet; Refill: 5  -     Cariprazine HCl (Vraylar) 1.5 MG capsule capsule; Take 1 capsule by mouth Daily.  Dispense: 30 capsule; Refill: 5    He does have markedly elevated blood pressure.  Due to the memory loss and other issues will get an MRI of the brain to rule out some type of anatomic or pathologic cause.  Given the family history however I strongly suspect that he does have paranoid schizophrenia.  He is adamant about not going to a psychiatrist.  We will try to treat with Vraylar as it is on his list of medications that should be metabolized normally at least and he does promise that he will return to see me in 3 to 4 weeks.    We will add Toprol-XL for blood blood pressure as well and he is asked to monitor his blood pressures at home regularly with a goal of 130/80.  Patient is advised to seek immediate medical attention for any acute neurologic changes or stroke-like symptoms.               This document has been electronically signed by Aura Carrillo MD on April 7, 2021 10:16 CDT

## 2021-04-08 ENCOUNTER — IMMUNIZATION (OUTPATIENT)
Dept: VACCINE CLINIC | Facility: HOSPITAL | Age: 46
End: 2021-04-08

## 2021-04-08 PROCEDURE — 91300 HC SARSCOV02 VAC 30MCG/0.3ML IM: CPT | Performed by: THORACIC SURGERY (CARDIOTHORACIC VASCULAR SURGERY)

## 2021-04-08 PROCEDURE — 0002A: CPT | Performed by: THORACIC SURGERY (CARDIOTHORACIC VASCULAR SURGERY)

## 2021-04-12 ENCOUNTER — TELEPHONE (OUTPATIENT)
Dept: FAMILY MEDICINE CLINIC | Facility: CLINIC | Age: 46
End: 2021-04-12

## 2021-04-12 DIAGNOSIS — F41.9 ANXIETY: Primary | ICD-10-CM

## 2021-04-12 RX ORDER — LISINOPRIL 20 MG/1
20 TABLET ORAL DAILY
Qty: 90 TABLET | Refills: 2 | Status: SHIPPED | OUTPATIENT
Start: 2021-04-12 | End: 2021-05-06

## 2021-04-12 RX ORDER — ALPRAZOLAM 0.5 MG/1
TABLET ORAL
Qty: 15 TABLET | Refills: 0 | Status: SHIPPED | OUTPATIENT
Start: 2021-04-12 | End: 2021-05-03

## 2021-04-15 ENCOUNTER — HOSPITAL ENCOUNTER (OUTPATIENT)
Dept: MRI IMAGING | Facility: HOSPITAL | Age: 46
Discharge: HOME | End: 2021-04-15
Admitting: FAMILY MEDICINE

## 2021-04-15 DIAGNOSIS — I10 ESSENTIAL HYPERTENSION: ICD-10-CM

## 2021-04-15 DIAGNOSIS — R41.3 MEMORY LOSS: ICD-10-CM

## 2021-04-15 PROCEDURE — 70553 MRI BRAIN STEM W/O & W/DYE: CPT

## 2021-04-15 PROCEDURE — 25010000002 GADOTERIDOL PER 1 ML: Performed by: FAMILY MEDICINE

## 2021-04-15 PROCEDURE — A9579 GAD-BASE MR CONTRAST NOS,1ML: HCPCS | Performed by: FAMILY MEDICINE

## 2021-04-15 RX ADMIN — GADOTERIDOL 20 ML: 279.3 INJECTION, SOLUTION INTRAVENOUS at 08:20

## 2021-05-03 DIAGNOSIS — F41.9 ANXIETY: Primary | ICD-10-CM

## 2021-05-03 RX ORDER — ALPRAZOLAM 1 MG/1
1 TABLET ORAL 2 TIMES DAILY PRN
Qty: 15 TABLET | Refills: 0 | Status: SHIPPED | OUTPATIENT
Start: 2021-05-03 | End: 2021-05-06

## 2021-05-03 RX ORDER — ALPRAZOLAM 1 MG/1
1 TABLET ORAL 2 TIMES DAILY PRN
Qty: 15 TABLET | Refills: 0 | Status: SHIPPED | OUTPATIENT
Start: 2021-05-03 | End: 2021-05-03 | Stop reason: SDUPTHER

## 2021-05-06 ENCOUNTER — OFFICE VISIT (OUTPATIENT)
Dept: FAMILY MEDICINE CLINIC | Facility: CLINIC | Age: 46
End: 2021-05-06

## 2021-05-06 DIAGNOSIS — F31.2 BIPOLAR I DISORDER, CURRENT OR MOST RECENT EPISODE MANIC, WITH PSYCHOTIC FEATURES (HCC): Primary | ICD-10-CM

## 2021-05-06 DIAGNOSIS — G47.00 INSOMNIA, UNSPECIFIED TYPE: ICD-10-CM

## 2021-05-06 DIAGNOSIS — I10 ESSENTIAL HYPERTENSION: ICD-10-CM

## 2021-05-06 DIAGNOSIS — F41.9 ANXIETY: ICD-10-CM

## 2021-05-06 PROCEDURE — 99214 OFFICE O/P EST MOD 30 MIN: CPT | Performed by: FAMILY MEDICINE

## 2021-05-06 RX ORDER — ALPRAZOLAM 1 MG/1
1 TABLET ORAL 2 TIMES DAILY PRN
Qty: 15 TABLET | Refills: 0 | Status: SHIPPED | OUTPATIENT
Start: 2021-05-06 | End: 2021-05-06

## 2021-05-06 RX ORDER — QUETIAPINE FUMARATE 25 MG/1
25 TABLET, FILM COATED ORAL NIGHTLY
Qty: 30 TABLET | Refills: 5 | Status: SHIPPED | OUTPATIENT
Start: 2021-05-06 | End: 2021-12-17 | Stop reason: ALTCHOICE

## 2021-05-06 RX ORDER — ALPRAZOLAM 1 MG/1
1 TABLET ORAL 2 TIMES DAILY PRN
Qty: 15 TABLET | Refills: 0 | Status: SHIPPED | OUTPATIENT
Start: 2021-05-06 | End: 2021-07-06 | Stop reason: SDUPTHER

## 2021-05-06 RX ORDER — AMLODIPINE BESYLATE 5 MG/1
5 TABLET ORAL DAILY
Qty: 30 TABLET | Refills: 5 | Status: SHIPPED | OUTPATIENT
Start: 2021-05-06 | End: 2021-10-28

## 2021-05-06 RX ORDER — ENALAPRIL MALEATE 20 MG/1
20 TABLET ORAL DAILY
Qty: 30 TABLET | Refills: 5 | Status: SHIPPED | OUTPATIENT
Start: 2021-05-06 | End: 2021-10-18 | Stop reason: SDUPTHER

## 2021-05-06 NOTE — PROGRESS NOTES
Subjective   Kuldip Bossman Castaneda is a 45 y.o. male.   Here in the office today for a few concerns.  Recently admitted to [facility] and had medication adjustment.  He feels like the Vraylar is helping him.  He is very reasonable today.  He feels like his blood pressure medicine is not working.  He is on lisinopril he thinks that the enalapril actually did better for him.  He is on clonidine and it does does not seem to be helping even when he takes it 3 times a day, which he sometimes admits that he forgets.  He would like a refill of Xanax as it comes in when he has a panic attack.  He only takes about 15/month.    He notes that he took Seroquel in the past and this helped him sleep and would like to try it again.    History of Present Illness  Hypertension   This is a chronic problem. The current episode started more than 1 year ago. The problem has been waxing and waning since onset. Associated symptoms include anxiety, headaches and malaise/fatigue. Pertinent negatives include no blurred vision, chest pain, neck pain, orthopnea, palpitations, peripheral edema, PND, shortness of breath or sweats. There are no associated agents to hypertension. Risk factors for coronary artery disease include dyslipidemia and family history. Past treatments include beta-blockers, ACE inhibitors, alpha agonist.   The current treatment provides moderate improvement. There are no compliance problems.    Anxiety   Presents for follow-up visit. Symptoms include decreased concentration, depressed mood, excessive worry, irritability, muscle tension, nervous/anxious behavior, palpitations, panic and restlessness. Patient reports no chest pain, compulsions, confusion, dizziness, dry mouth, feeling of choking, hyperventilation, impotence, insomnia, malaise, nausea, obsessions, shortness of breath or suicidal ideas. Symptoms occur most days. The severity of symptoms is severe and causing significant distress. The quality of sleep is fair.  Nighttime awakenings: occasional.     Compliance with medications is %.     The following portions of the patient's history were reviewed and updated as appropriate: allergies, current medications, past family history, past medical history, past social history, past surgical history and problem list.    Review of Systems   HENT: Negative for sneezing and trouble swallowing.    Eyes: Negative for visual disturbance.   Respiratory: Negative for chest tightness and wheezing.    Cardiovascular: Negative for leg swelling.   Gastrointestinal: Positive for abdominal pain.   Genitourinary: Negative for urgency.   Musculoskeletal: Negative for back pain.   Neurological: Negative for dizziness.   Psychiatric/Behavioral: Positive for hallucinations.            All other systems reviewed and are negative.      Objective    Vitals:    05/06/21 1006   PainSc: 0-No pain     There is no height or weight on file to calculate BMI.    Physical Exam   Constitutional: He appears well-developed. He is cooperative. He does not appear ill.   HENT:   Head: Normocephalic and atraumatic.   Nose: Nose normal.   Eyes: Pupils are equal, round, and reactive to light. Conjunctivae are normal. Right eye exhibits no discharge. Left eye exhibits no discharge. No scleral icterus.   Neck: Trachea normal and phonation normal. No thyromegaly present.       Cardiovascular: Normal rate, regular rhythm and normal heart sounds. Exam reveals no gallop and no friction rub.   No murmur heard.  Pulmonary/Chest: Effort normal and breath sounds normal. No respiratory distress. He has no wheezes. He has no rales.   Abdominal: Soft. Normal appearance and bowel sounds are normal. He exhibits no distension. There is no abdominal tenderness. There is no rebound and no guarding.   Musculoskeletal: Normal range of motion.      Lumbar back: Pain: chronic, rates pain a 5.     Vascular Status -  His right foot exhibits abnormal foot vasculature . His right foot  exhibits no edema. His left foot exhibits abnormal foot vasculature . His left foot exhibits no edema.  Lymphadenopathy:     He has no cervical adenopathy.   Neurological: No cranial nerve deficit.   Skin: Skin is warm and dry.   Psychiatric:   Patient is oriented to place and person but shows consistent patterns of illogical and irrational thinking, delusions of grandeur and persecution.   Nursing note and vitals reviewed.    Assessment/Plan   Diagnoses and all orders for this visit:    1. Bipolar I disorder, current or most recent episode manic, with psychotic features (CMS/HCC) (Primary)    2. Anxiety  -     ALPRAZolam (XANAX) 1 MG tablet; Take 1 tablet by mouth 2 (Two) Times a Day As Needed for Anxiety **Do NOT take more than 15 tablets per month**  Dispense: 15 tablet; Refill: 0    3. Insomnia, unspecified type  -     QUEtiapine (SEROquel) 25 MG tablet; Take 1 tablet by mouth Every Night.  Dispense: 30 tablet; Refill: 5    4. Essential hypertension  -     enalapril (VASOTEC) 20 MG tablet; Take 1 tablet by mouth Daily.  Dispense: 30 tablet; Refill: 5  -     amLODIPine (NORVASC) 5 MG tablet; Take 1 tablet by mouth Daily.  Dispense: 30 tablet; Refill: 5    Continue with Vraylar and follow-up with psychiatry as scheduled    Switch from lisinopril to enalapril and will add amlodipine.  Will discontinue clonidine but have him keep it at home    Continue Xanax only as needed for panic attacks    We will go back on Seroquel at night to help calm him and help him sleep    .The patient has read and signed the [facility] Controlled Substance Contract.  I will continue to see patient for regular follow up appointments. Patient is well controlled on the medication.  MARY has been reviewed by me and is updated every 3 months. The patient is aware of the potential for addiction and dependence.         This document has been electronically signed by Aura Carrillo MD on May 6, 2021 15:54 CDT

## 2021-06-01 ENCOUNTER — OFFICE VISIT (OUTPATIENT)
Dept: FAMILY MEDICINE CLINIC | Facility: CLINIC | Age: 46
End: 2021-06-01

## 2021-06-01 VITALS — HEIGHT: 72 IN | BODY MASS INDEX: 38.6 KG/M2 | WEIGHT: 285 LBS

## 2021-06-01 DIAGNOSIS — F31.77 BIPOLAR 1 DISORDER, MIXED, PARTIAL REMISSION (HCC): Primary | ICD-10-CM

## 2021-06-01 DIAGNOSIS — IMO0002 TYPE II DIABETES MELLITUS WITH COMPLICATION, UNCONTROLLED: ICD-10-CM

## 2021-06-01 DIAGNOSIS — E66.01 OBESITY, CLASS III, BMI 40-49.9 (MORBID OBESITY) (HCC): ICD-10-CM

## 2021-06-01 PROCEDURE — 99214 OFFICE O/P EST MOD 30 MIN: CPT | Performed by: FAMILY MEDICINE

## 2021-06-02 NOTE — PROGRESS NOTES
Subjective   Kuldip Bossman Castaneda is a 45 y.o. male.   Here in the office today for a few concerns.  Concerned about his weight.  He says he tried very hard with diet and exercise and has been trying to lose some weight but would like to see someone about bariatric surgery and or possibly a tummy tuck.    He continues to have a lot of stress over some legal situations but has court tomorrow to see about a resolution.    He reports that the Vraylar seems to have made a significant difference in his thinking.  He says that family members have noticed an improvement.  In fact today he is much more rational and logical.    Reports blood sugars are doing great, usually staying around 110.    History of Present Illness  Hypertension   This is a chronic problem. The current episode started more than 1 year ago. The problem has been waxing and waning since onset. Associated symptoms include anxiety, headaches and malaise/fatigue. Pertinent negatives include no blurred vision, chest pain, neck pain, orthopnea, palpitations, peripheral edema, PND, shortness of breath or sweats. There are no associated agents to hypertension. Risk factors for coronary artery disease include dyslipidemia and family history. Past treatments include beta-blockers, ACE inhibitors, alpha agonist.   The current treatment provides moderate improvement. There are no compliance problems.    Anxiety   Presents for follow-up visit. Symptoms include decreased concentration, depressed mood, excessive worry, irritability, muscle tension, nervous/anxious behavior, palpitations, panic and restlessness. Patient reports no chest pain, compulsions, confusion, dizziness, dry mouth, feeling of choking, hyperventilation, impotence, insomnia, malaise, nausea, obsessions, shortness of breath or suicidal ideas. Symptoms occur most days. The severity of symptoms is severe and causing significant distress. The quality of sleep is fair. Nighttime awakenings: occasional.  "    Compliance with medications is %.     The following portions of the patient's history were reviewed and updated as appropriate: allergies, current medications, past family history, past medical history, past social history, past surgical history and problem list.    Review of Systems   HENT: Negative for sneezing and trouble swallowing.    Eyes: Negative for visual disturbance.   Respiratory: Negative for chest tightness and wheezing.    Cardiovascular: Negative for leg swelling.   Gastrointestinal: Positive for abdominal pain.   Genitourinary: Negative for urgency.   Musculoskeletal: Negative for back pain.   Neurological: Negative for dizziness.   Psychiatric/Behavioral: Positive for hallucinations.            All other systems reviewed and are negative.      Objective    Vitals:    06/01/21 0837   Weight: 129 kg (285 lb)   Height: 182.9 cm (72\")   PainSc: 0-No pain     Body mass index is 38.65 kg/m².    Physical Exam   Constitutional: He appears well-developed. He is cooperative. He does not appear ill.   HENT:   Head: Normocephalic and atraumatic.   Nose: Nose normal.   Eyes: Pupils are equal, round, and reactive to light. Conjunctivae are normal. Right eye exhibits no discharge. Left eye exhibits no discharge. No scleral icterus.   Neck: Trachea normal and phonation normal. No thyromegaly present.       Cardiovascular: Normal rate, regular rhythm and normal heart sounds. Exam reveals no gallop and no friction rub.   No murmur heard.  Pulmonary/Chest: Effort normal and breath sounds normal. No respiratory distress. He has no wheezes. He has no rales.   Abdominal: Soft. Normal appearance and bowel sounds are normal. He exhibits no distension. There is no abdominal tenderness. There is no rebound and no guarding.   Musculoskeletal: Normal range of motion.      Lumbar back: Pain: chronic, rates pain a 5.     Vascular Status -  His right foot exhibits abnormal foot vasculature . His right foot exhibits no " "edema. His left foot exhibits abnormal foot vasculature . His left foot exhibits no edema.  Lymphadenopathy:     He has no cervical adenopathy.   Neurological: No cranial nerve deficit.   Skin: Skin is warm and dry.   Psychiatric:   Patient is oriented to place and person but shows consistent patterns of illogical and irrational thinking, delusions of grandeur and persecution.   Nursing note and vitals reviewed.    Assessment/Plan   Diagnoses and all orders for this visit:    1. Bipolar 1 disorder, mixed, partial remission (CMS/[facility]) (Primary)    2. Type II diabetes mellitus with complication, uncontrolled (CMS/[facility])    3. Obesity, Class III, BMI 40-49.9 (morbid obesity) (CMS/[facility])  -     Ambulatory Referral to Bariatric Surgery    Continue with Vraylar and follow-up with psychiatry as scheduled    We will make referral to bariatric surgery to see if he is a candidate and if not may consider the \"tummy tuck\"    Continue with current diabetes medications and testing of blood sugars at least daily and prn.  Encourage compliance with diabetic diet.            This document has been electronically signed by Aura Carrillo MD on June 1, 2021 19:15 CDT               "

## 2021-07-06 DIAGNOSIS — F41.9 ANXIETY: ICD-10-CM

## 2021-07-06 RX ORDER — ALPRAZOLAM 1 MG/1
1 TABLET ORAL 2 TIMES DAILY PRN
Qty: 15 TABLET | Refills: 0 | Status: SHIPPED | OUTPATIENT
Start: 2021-07-06 | End: 2021-08-11 | Stop reason: SDUPTHER

## 2021-08-11 DIAGNOSIS — IMO0002 TYPE II DIABETES MELLITUS WITH COMPLICATION, UNCONTROLLED: Primary | ICD-10-CM

## 2021-08-11 DIAGNOSIS — F41.9 ANXIETY: ICD-10-CM

## 2021-08-11 DIAGNOSIS — E78.2 MIXED HYPERLIPIDEMIA: ICD-10-CM

## 2021-08-11 DIAGNOSIS — I10 ESSENTIAL HYPERTENSION: ICD-10-CM

## 2021-08-11 RX ORDER — ALPRAZOLAM 1 MG/1
1 TABLET ORAL 2 TIMES DAILY PRN
Qty: 15 TABLET | Refills: 0 | Status: SHIPPED | OUTPATIENT
Start: 2021-08-11 | End: 2021-09-01

## 2021-09-01 ENCOUNTER — LAB (OUTPATIENT)
Dept: LAB | Facility: OTHER | Age: 46
End: 2021-09-01

## 2021-09-01 ENCOUNTER — OFFICE VISIT (OUTPATIENT)
Dept: FAMILY MEDICINE CLINIC | Facility: CLINIC | Age: 46
End: 2021-09-01

## 2021-09-01 VITALS — SYSTOLIC BLOOD PRESSURE: 170 MMHG | HEIGHT: 72 IN | BODY MASS INDEX: 38.65 KG/M2 | DIASTOLIC BLOOD PRESSURE: 112 MMHG

## 2021-09-01 DIAGNOSIS — I10 ESSENTIAL HYPERTENSION: ICD-10-CM

## 2021-09-01 DIAGNOSIS — IMO0002 TYPE II DIABETES MELLITUS WITH COMPLICATION, UNCONTROLLED: ICD-10-CM

## 2021-09-01 DIAGNOSIS — G47.00 INSOMNIA, UNSPECIFIED TYPE: Primary | ICD-10-CM

## 2021-09-01 DIAGNOSIS — F31.77 BIPOLAR 1 DISORDER, MIXED, PARTIAL REMISSION (HCC): ICD-10-CM

## 2021-09-01 DIAGNOSIS — E78.2 MIXED HYPERLIPIDEMIA: ICD-10-CM

## 2021-09-01 LAB
ALBUMIN SERPL-MCNC: 4.7 G/DL (ref 3.5–5)
ALBUMIN/GLOB SERPL: 1.3 G/DL (ref 1.1–1.8)
ALP SERPL-CCNC: 56 U/L (ref 38–126)
ALT SERPL W P-5'-P-CCNC: 29 U/L
ANION GAP SERPL CALCULATED.3IONS-SCNC: 8 MMOL/L (ref 5–15)
AST SERPL-CCNC: 23 U/L (ref 17–59)
BASOPHILS # BLD AUTO: 0.02 10*3/MM3 (ref 0–0.2)
BASOPHILS NFR BLD AUTO: 0.4 % (ref 0–1.5)
BILIRUB SERPL-MCNC: 0.4 MG/DL (ref 0.2–1.3)
BUN SERPL-MCNC: 14 MG/DL (ref 7–23)
BUN/CREAT SERPL: 14.9 (ref 7–25)
CALCIUM SPEC-SCNC: 10.1 MG/DL (ref 8.4–10.2)
CHLORIDE SERPL-SCNC: 102 MMOL/L (ref 101–112)
CHOLEST SERPL-MCNC: 251 MG/DL (ref 150–200)
CO2 SERPL-SCNC: 26 MMOL/L (ref 22–30)
CREAT SERPL-MCNC: 0.94 MG/DL (ref 0.7–1.3)
DEPRECATED RDW RBC AUTO: 46.1 FL (ref 37–54)
EOSINOPHIL # BLD AUTO: 0.25 10*3/MM3 (ref 0–0.4)
EOSINOPHIL NFR BLD AUTO: 5.3 % (ref 0.3–6.2)
ERYTHROCYTE [DISTWIDTH] IN BLOOD BY AUTOMATED COUNT: 15.2 % (ref 12.3–15.4)
GFR SERPL CREATININE-BSD FRML MDRD: 105 ML/MIN/1.73 (ref 63–147)
GLOBULIN UR ELPH-MCNC: 3.5 GM/DL (ref 2.3–3.5)
GLUCOSE SERPL-MCNC: 195 MG/DL (ref 70–99)
HCT VFR BLD AUTO: 39.3 % (ref 37.5–51)
HDLC SERPL-MCNC: 44 MG/DL (ref 40–59)
HGB BLD-MCNC: 14.2 G/DL (ref 13–17.7)
LDLC SERPL CALC-MCNC: 150 MG/DL
LDLC/HDLC SERPL: 3.29 {RATIO} (ref 0–3.55)
LYMPHOCYTES # BLD AUTO: 1.97 10*3/MM3 (ref 0.7–3.1)
LYMPHOCYTES NFR BLD AUTO: 42.1 % (ref 19.6–45.3)
MCH RBC QN AUTO: 30.5 PG (ref 26.6–33)
MCHC RBC AUTO-ENTMCNC: 36.1 G/DL (ref 31.5–35.7)
MCV RBC AUTO: 84.3 FL (ref 79–97)
MONOCYTES # BLD AUTO: 0.41 10*3/MM3 (ref 0.1–0.9)
MONOCYTES NFR BLD AUTO: 8.8 % (ref 5–12)
NEUTROPHILS NFR BLD AUTO: 2.03 10*3/MM3 (ref 1.7–7)
NEUTROPHILS NFR BLD AUTO: 43.4 % (ref 42.7–76)
PLATELET # BLD AUTO: 269 10*3/MM3 (ref 140–450)
PMV BLD AUTO: 8.8 FL (ref 6–12)
POTASSIUM SERPL-SCNC: 3.9 MMOL/L (ref 3.4–5)
PROT SERPL-MCNC: 8.2 G/DL (ref 6.3–8.6)
RBC # BLD AUTO: 4.66 10*6/MM3 (ref 4.14–5.8)
SODIUM SERPL-SCNC: 136 MMOL/L (ref 137–145)
TRIGL SERPL-MCNC: 311 MG/DL
VLDLC SERPL-MCNC: 57 MG/DL (ref 5–40)
WBC # BLD AUTO: 4.68 10*3/MM3 (ref 3.4–10.8)

## 2021-09-01 PROCEDURE — 84439 ASSAY OF FREE THYROXINE: CPT | Performed by: FAMILY MEDICINE

## 2021-09-01 PROCEDURE — 85025 COMPLETE CBC W/AUTO DIFF WBC: CPT | Performed by: FAMILY MEDICINE

## 2021-09-01 PROCEDURE — 83036 HEMOGLOBIN GLYCOSYLATED A1C: CPT | Performed by: FAMILY MEDICINE

## 2021-09-01 PROCEDURE — 84443 ASSAY THYROID STIM HORMONE: CPT | Performed by: FAMILY MEDICINE

## 2021-09-01 PROCEDURE — 99214 OFFICE O/P EST MOD 30 MIN: CPT | Performed by: FAMILY MEDICINE

## 2021-09-01 PROCEDURE — 36415 COLL VENOUS BLD VENIPUNCTURE: CPT | Performed by: FAMILY MEDICINE

## 2021-09-01 PROCEDURE — 80061 LIPID PANEL: CPT | Performed by: FAMILY MEDICINE

## 2021-09-01 PROCEDURE — 80053 COMPREHEN METABOLIC PANEL: CPT | Performed by: FAMILY MEDICINE

## 2021-09-01 RX ORDER — DULAGLUTIDE 0.75 MG/.5ML
0.75 INJECTION, SOLUTION SUBCUTANEOUS WEEKLY
Qty: 2 ML | Refills: 5 | Status: SHIPPED | OUTPATIENT
Start: 2021-09-01 | End: 2021-09-28

## 2021-09-01 RX ORDER — TEMAZEPAM 15 MG/1
15 CAPSULE ORAL NIGHTLY PRN
Qty: 30 CAPSULE | Refills: 2 | Status: SHIPPED | OUTPATIENT
Start: 2021-09-01 | End: 2021-09-28

## 2021-09-01 NOTE — PROGRESS NOTES
Subjective   Kuldipbret Castaneda is a 46 y.o. male.   Here in the office today for follow-up on bipolar disorder with psychosis, hypertension, type 2 diabetes.    Reports blood sugars are doing great, usually staying around 110.    Blood pressure is high.  Initially said he was taking his medications but I think he has only taking one of them, likely the enalapril.    He says he is still taking the Vraylar but is still having some illogical thinking.  For example he tells me that when he cannot sleep at night he says awaken invent things and recently discovered how to invent water    He is still not sleeping well.  He has been taking Xanax at night to help him sleep.  He says he does not need it during the daytime.  We discussed changing him over to temazepam for sleep if he truly only needs the benzo at night and he is willing to try this.    History of Present Illness  Hypertension   This is a chronic problem. The current episode started more than 1 year ago. The problem has been waxing and waning since onset. Associated symptoms include anxiety, headaches and malaise/fatigue. Pertinent negatives include no blurred vision, chest pain, neck pain, orthopnea, palpitations, peripheral edema, PND, shortness of breath or sweats. There are no associated agents to hypertension. Risk factors for coronary artery disease include dyslipidemia and family history. Past treatments include beta-blockers, ACE inhibitors, alpha agonist.   The current treatment provides moderate improvement. There are no compliance problems.    Anxiety   Presents for follow-up visit. Symptoms include decreased concentration, depressed mood, excessive worry, irritability, muscle tension, nervous/anxious behavior, palpitations, panic and restlessness. Patient reports no chest pain, compulsions, confusion, dizziness, dry mouth, feeling of choking, hyperventilation, impotence, insomnia, malaise, nausea, obsessions, shortness of breath or suicidal ideas.  "Symptoms occur most days. The severity of symptoms is severe and causing significant distress. The quality of sleep is fair. Nighttime awakenings: occasional.     Compliance with medications is %.     The following portions of the patient's history were reviewed and updated as appropriate: allergies, current medications, past family history, past medical history, past social history, past surgical history and problem list.    Review of Systems   HENT: Negative for sneezing and trouble swallowing.    Eyes: Negative for visual disturbance.   Respiratory: Negative for chest tightness and wheezing.    Cardiovascular: Negative for leg swelling.   Gastrointestinal: Positive for abdominal pain.   Genitourinary: Negative for urgency.   Musculoskeletal: Negative for back pain.   Neurological: Negative for dizziness.   Psychiatric/Behavioral: Positive for hallucinations.            All other systems reviewed and are negative.      Objective    Vitals:    09/01/21 0928   BP: (!) 170/112   Weight: Comment: refused   Height: 182.9 cm (72\")   PainSc: 0-No pain     Body mass index is 38.65 kg/m².    Physical Exam   Constitutional: He appears well-developed. He is cooperative. He does not appear ill.   HENT:   Head: Normocephalic and atraumatic.   Nose: Nose normal.   Eyes: Pupils are equal, round, and reactive to light. Conjunctivae are normal. Right eye exhibits no discharge. Left eye exhibits no discharge. No scleral icterus.   Neck: Trachea normal and phonation normal. No thyromegaly present.       Cardiovascular: Normal rate, regular rhythm and normal heart sounds. Exam reveals no gallop and no friction rub.   No murmur heard.  Pulmonary/Chest: Effort normal and breath sounds normal. No respiratory distress. He has no wheezes. He has no rales.   Abdominal: Soft. Normal appearance and bowel sounds are normal. He exhibits no distension. There is no abdominal tenderness. There is no rebound and no guarding. "   Musculoskeletal: Normal range of motion.      Lumbar back: Pain: chronic, rates pain a 5.     Vascular Status -  His right foot exhibits abnormal foot vasculature . His right foot exhibits no edema. His left foot exhibits abnormal foot vasculature . His left foot exhibits no edema.  Lymphadenopathy:     He has no cervical adenopathy.   Neurological: No cranial nerve deficit.   Skin: Skin is warm and dry.   Psychiatric:   Patient is oriented to place and person but shows consistent patterns of illogical and irrational thinking, delusions of grandeur and persecution.   Nursing note and vitals reviewed.    Assessment/Plan   Diagnoses and all orders for this visit:    1. Insomnia, unspecified type (Primary)  -     temazepam (RESTORIL) 15 MG capsule; Take 1 capsule by mouth At Night As Needed for Sleep.  Dispense: 30 capsule; Refill: 2    2. Essential hypertension    3. Bipolar 1 disorder, mixed, partial remission (CMS/HCC)    4. Type II diabetes mellitus with complication, uncontrolled (CMS/HCC)    Will discontinue alprazolam and switch to temazepam at bedtime for insomnia.  I think a good restful sleep would be very helpful for him overall.    Encouraged him to continue to take the Vraylar and Seroquel and he admits that the Seroquel does help with his racing thoughts.    He will go back on enalapril and amlodipine for hypertension and goal of 130/80 is given with him instructed to check the blood pressure regularly.    Continue with current diabetes medications and testing of blood sugars at least daily and prn.  Encourage compliance with diabetic diet.      Labs are ordered and he is to return fasting.        This document has been electronically signed by Aura Carrillo MD on September 1, 2021 10:01 CDT

## 2021-09-02 LAB
HBA1C MFR BLD: 8.8 % (ref 4.8–5.6)
T4 FREE SERPL-MCNC: 1.34 NG/DL (ref 0.93–1.7)
TSH SERPL DL<=0.05 MIU/L-ACNC: 4.22 UIU/ML (ref 0.27–4.2)

## 2021-09-08 RX ORDER — LEVOTHYROXINE SODIUM 0.05 MG/1
50 TABLET ORAL DAILY
Qty: 90 TABLET | Refills: 1 | Status: SHIPPED | OUTPATIENT
Start: 2021-09-08 | End: 2022-03-22 | Stop reason: SDUPTHER

## 2021-09-08 NOTE — PROGRESS NOTES
Please call the patient regarding his abnormal result.  Blood sugar is running high, thyroid is underactive.  We expected this because he said he has been off of his medicines.  Tell him once he gets back on the medicine we should recheck labs in 3 months.

## 2021-09-28 ENCOUNTER — OFFICE VISIT (OUTPATIENT)
Dept: FAMILY MEDICINE CLINIC | Facility: CLINIC | Age: 46
End: 2021-09-28

## 2021-09-28 VITALS
HEART RATE: 80 BPM | DIASTOLIC BLOOD PRESSURE: 92 MMHG | OXYGEN SATURATION: 98 % | BODY MASS INDEX: 38.65 KG/M2 | HEIGHT: 72 IN | SYSTOLIC BLOOD PRESSURE: 144 MMHG | TEMPERATURE: 96.9 F

## 2021-09-28 DIAGNOSIS — I10 ESSENTIAL HYPERTENSION: ICD-10-CM

## 2021-09-28 DIAGNOSIS — F41.9 ANXIETY: ICD-10-CM

## 2021-09-28 DIAGNOSIS — E66.01 OBESITY, CLASS III, BMI 40-49.9 (MORBID OBESITY) (HCC): ICD-10-CM

## 2021-09-28 DIAGNOSIS — IMO0002 TYPE II DIABETES MELLITUS WITH COMPLICATION, UNCONTROLLED: Primary | ICD-10-CM

## 2021-09-28 PROCEDURE — 99214 OFFICE O/P EST MOD 30 MIN: CPT | Performed by: FAMILY MEDICINE

## 2021-09-28 RX ORDER — DULAGLUTIDE 1.5 MG/.5ML
1.5 INJECTION, SOLUTION SUBCUTANEOUS
Qty: 2 ML | Refills: 5 | Status: SHIPPED | OUTPATIENT
Start: 2021-09-28 | End: 2022-01-05 | Stop reason: SDUPTHER

## 2021-09-28 RX ORDER — ALPRAZOLAM 1 MG/1
1 TABLET ORAL NIGHTLY PRN
Qty: 30 TABLET | Refills: 2 | Status: SHIPPED | OUTPATIENT
Start: 2021-09-28 | End: 2022-01-05 | Stop reason: SDUPTHER

## 2021-09-28 RX ORDER — AMLODIPINE BESYLATE 5 MG/1
5 TABLET ORAL DAILY
Qty: 90 TABLET | Refills: 3 | Status: SHIPPED | OUTPATIENT
Start: 2021-09-28 | End: 2022-07-05 | Stop reason: SDUPTHER

## 2021-09-28 NOTE — PROGRESS NOTES
Subjective   Kuldip Castaneda is a 46 y.o. male.   With type 2 diabetes, hypertension, bipolar disorder, anxiety here today for follow-up on these issues and some concerns.  He says that his blood sugars got up over 500 recently.  He attributes this to drinking orange juice which he has stopped doing and the sugars have started to come down a bit but he is trying harder with dietary compliance.    He would like to see about going back on Xanax.  We had him on temazepam at bedtime to help with sleep but he says the Xanax worked a lot better for him.  He did not need it every night in fact but only on nights he could not sleep.  He feels the temazepam has not helped.  He feels the insomnia causes a lot of issues for him during the day including increasing his anxiety and the stress in turn may raise his blood sugar.    History of Present Illness  Hypertension   This is a chronic problem. The current episode started more than 1 year ago. The problem has been waxing and waning since onset. Associated symptoms include anxiety, headaches and malaise/fatigue. Pertinent negatives include no blurred vision, chest pain, neck pain, orthopnea, palpitations, peripheral edema, PND, shortness of breath or sweats. There are no associated agents to hypertension. Risk factors for coronary artery disease include dyslipidemia and family history. Past treatments include beta-blockers, ACE inhibitors, alpha agonist.   The current treatment provides moderate improvement. There are no compliance problems.    Anxiety   Presents for follow-up visit. Symptoms include decreased concentration, depressed mood, excessive worry, irritability, muscle tension, nervous/anxious behavior, palpitations, panic and restlessness. Patient reports no chest pain, compulsions, confusion, dizziness, dry mouth, feeling of choking, hyperventilation, impotence, insomnia, malaise, nausea, obsessions, shortness of breath or suicidal ideas. Symptoms occur most days.  "The severity of symptoms is severe and causing significant distress. The quality of sleep is fair. Nighttime awakenings: occasional.     Compliance with medications is %.     Insomnia   This is a chronic problem. The current episode started more than 1 year ago. The problem occurs daily. The problem has been unchanged. Associated symptoms include fatigue. Pertinent negatives include no abdominal pain, anorexia, arthralgias, change in bowel habit, chest pain, chills, congestion, coughing, diaphoresis, fever, headaches, joint swelling, myalgias, nausea, neck pain, numbness, rash, sore throat, swollen glands, urinary symptoms, vertigo, visual change, vomiting or weakness. The symptoms are aggravated by exertion and stress. Treatments tried: OTC sleep aids. The treatment provided no relief.     The following portions of the patient's history were reviewed and updated as appropriate: allergies, current medications, past family history, past medical history, past social history, past surgical history and problem list.    Review of Systems   HENT: Negative for sneezing and trouble swallowing.    Eyes: Negative for visual disturbance.   Respiratory: Negative for chest tightness and wheezing.    Cardiovascular: Negative for leg swelling.   Gastrointestinal: Positive for abdominal pain.   Genitourinary: Negative for urgency.   Musculoskeletal: Negative for back pain.   Neurological: Negative for dizziness.   Psychiatric/Behavioral: Positive for hallucinations.            All other systems reviewed and are negative.      Objective    Vitals:    09/28/21 0758   BP: 144/92   Pulse: 80   Temp: 96.9 °F (36.1 °C)   TempSrc: Tympanic   SpO2: 98%   Weight: Comment: refuse   Height: 182.9 cm (72\")   PainSc: 0-No pain     Body mass index is 38.65 kg/m².    Physical Exam   Constitutional: He appears well-developed. He is cooperative. He does not appear ill.   HENT:   Head: Normocephalic and atraumatic.   Nose: Nose normal.   Eyes: " Pupils are equal, round, and reactive to light. Conjunctivae are normal. Right eye exhibits no discharge. Left eye exhibits no discharge. No scleral icterus.   Neck: Trachea normal and phonation normal. No thyromegaly present.       Cardiovascular: Normal rate, regular rhythm and normal heart sounds. Exam reveals no gallop and no friction rub.   No murmur heard.  Pulmonary/Chest: Effort normal and breath sounds normal. No respiratory distress. He has no wheezes. He has no rales.   Abdominal: Soft. Normal appearance and bowel sounds are normal. He exhibits no distension. There is no abdominal tenderness. There is no rebound and no guarding.   Musculoskeletal: Normal range of motion.      Lumbar back: Pain: chronic, rates pain a 5.     Vascular Status -  His right foot exhibits abnormal foot vasculature . His right foot exhibits no edema. His left foot exhibits abnormal foot vasculature . His left foot exhibits no edema.  Lymphadenopathy:     He has no cervical adenopathy.   Neurological: No cranial nerve deficit.   Skin: Skin is warm and dry.   Psychiatric:   Patient is oriented to place and person but shows consistent patterns of illogical and irrational thinking, delusions of grandeur and persecution.   Nursing note and vitals reviewed.    Assessment/Plan   Diagnoses and all orders for this visit:    1. Type II diabetes mellitus with complication, uncontrolled (HCC) (Primary)  -     Dulaglutide (Trulicity) 1.5 MG/0.5ML solution pen-injector; Inject 1.5 mg (1 syringe) under the skin into the appropriate area as directed Every 7 (Seven) Days.  Dispense: 2 mL; Refill: 5    2. Essential hypertension  -     amLODIPine (NORVASC) 5 MG tablet; Take 1 tablet by mouth Daily.  Dispense: 90 tablet; Refill: 3    3. Anxiety  -     ALPRAZolam (XANAX) 1 MG tablet; Take 1 tablet by mouth At Night As Needed for Anxiety.  Dispense: 30 tablet; Refill: 2    4. Obesity, Class III, BMI 40-49.9 (morbid obesity) ([facility])    Continue on  Trulicity but will increase the dose to 1.5 mg.  We will follow him monthly until we get the blood sugars under control.  We will get an A1c next visit.  Encouraged to check blood sugars at least daily and when needed, and encourage compliance with diabetic diet which we discussed specifically.    Discontinue temazepam and go back to Xanax at bedtime only for when anxiety, when needed.    Continue amlodipine for hypertension.    Discussed the patient's BMI with him. BMI is above normal parameters. Follow-up plan includes:  educational material and nutrition counseling.          This document has been electronically signed by Aura Carrillo MD on September 28, 2021 08:39 CDT

## 2021-10-18 DIAGNOSIS — I10 ESSENTIAL HYPERTENSION: ICD-10-CM

## 2021-10-20 RX ORDER — ENALAPRIL MALEATE 20 MG/1
20 TABLET ORAL DAILY
Qty: 30 TABLET | Refills: 5 | Status: SHIPPED | OUTPATIENT
Start: 2021-10-20 | End: 2022-06-09 | Stop reason: SDUPTHER

## 2021-10-28 ENCOUNTER — OFFICE VISIT (OUTPATIENT)
Dept: FAMILY MEDICINE CLINIC | Facility: CLINIC | Age: 46
End: 2021-10-28

## 2021-10-28 VITALS
TEMPERATURE: 96.8 F | HEART RATE: 97 BPM | DIASTOLIC BLOOD PRESSURE: 86 MMHG | SYSTOLIC BLOOD PRESSURE: 126 MMHG | OXYGEN SATURATION: 97 %

## 2021-10-28 DIAGNOSIS — F31.77 BIPOLAR 1 DISORDER, MIXED, PARTIAL REMISSION (HCC): Primary | ICD-10-CM

## 2021-10-28 DIAGNOSIS — I10 ESSENTIAL HYPERTENSION: ICD-10-CM

## 2021-10-28 DIAGNOSIS — IMO0002 TYPE II DIABETES MELLITUS WITH COMPLICATION, UNCONTROLLED: ICD-10-CM

## 2021-10-28 DIAGNOSIS — E66.01 OBESITY, CLASS III, BMI 40-49.9 (MORBID OBESITY) (HCC): ICD-10-CM

## 2021-10-28 DIAGNOSIS — F41.9 ANXIETY: ICD-10-CM

## 2021-10-28 PROCEDURE — 99214 OFFICE O/P EST MOD 30 MIN: CPT | Performed by: FAMILY MEDICINE

## 2021-10-28 NOTE — PROGRESS NOTES
Subjective   Kuldipbret Castaneda is a 46 y.o. male.   With type 2 diabetes, hypertension, bipolar disorder, anxiety here today for follow-up. He feels like he is doing well. He says the Vraylar does cause him to have a funny headed sensation at times or mild dizziness but overall feels it is working. We had a very monica discussion today about the bipolar disorder. He does not like the thought of having to take medication all the time but I really leveled with him and told him that treatment of his symptoms is going to depend on the medication as well as some therapy or counseling. I appreciate the fact that he wants to have the mental fortitude and strength to try to take care of this on his own but that this disorder is not treatable without medication and he seems to understand and acknowledge that it is helping.    His blood sugars are doing much better, around 1 50-1 60. Blood pressure has been more well controlled also. Now that his bipolar disorder is treated he is much more compliant with taking his medications. He reports that he has not been taking the Xanax. He still has it but does not wish to depend on that.    History of Present Illness  Hypertension   This is a chronic problem. The current episode started more than 1 year ago. The problem has been waxing and waning since onset. Associated symptoms include anxiety, headaches and malaise/fatigue. Pertinent negatives include no blurred vision, chest pain, neck pain, orthopnea, palpitations, peripheral edema, PND, shortness of breath or sweats. There are no associated agents to hypertension. Risk factors for coronary artery disease include dyslipidemia and family history. Past treatments include beta-blockers, ACE inhibitors, alpha agonist.   The current treatment provides moderate improvement. There are no compliance problems.    Anxiety   Presents for follow-up visit. Symptoms include decreased concentration, depressed mood, excessive worry, irritability,  muscle tension, nervous/anxious behavior, palpitations, panic and restlessness. Patient reports no chest pain, compulsions, confusion, dizziness, dry mouth, feeling of choking, hyperventilation, impotence, insomnia, malaise, nausea, obsessions, shortness of breath or suicidal ideas. Symptoms occur most days. The severity of symptoms is severe and causing significant distress. The quality of sleep is fair. Nighttime awakenings: occasional.     Compliance with medications is %.     Insomnia   This is a chronic problem. The current episode started more than 1 year ago. The problem occurs daily. The problem has been unchanged. Associated symptoms include fatigue. Pertinent negatives include no abdominal pain, anorexia, arthralgias, change in bowel habit, chest pain, chills, congestion, coughing, diaphoresis, fever, headaches, joint swelling, myalgias, nausea, neck pain, numbness, rash, sore throat, swollen glands, urinary symptoms, vertigo, visual change, vomiting or weakness. The symptoms are aggravated by exertion and stress. Treatments tried: OTC sleep aids. The treatment provided no relief.     The following portions of the patient's history were reviewed and updated as appropriate: allergies, current medications, past family history, past medical history, past social history, past surgical history and problem list.    Review of Systems   HENT: Negative for sneezing and trouble swallowing.    Eyes: Negative for visual disturbance.   Respiratory: Negative for chest tightness and wheezing.    Cardiovascular: Negative for leg swelling.   Gastrointestinal: Positive for abdominal pain.   Genitourinary: Negative for urgency.   Musculoskeletal: Negative for back pain.   Psychiatric/Behavioral: Positive for hallucinations.            All other systems reviewed and are negative.      Objective    Vitals:    10/28/21 0754   BP: 126/86   Pulse: 97   Temp: 96.8 °F (36 °C)   SpO2: 97%   PainSc: 0-No pain     There is no  height or weight on file to calculate BMI.  Patient declines weighing today. Most recent BMI 38.6, patient says he does not feel weight has changed significantly    Physical Exam   Constitutional: He appears well-developed. He is cooperative. He does not appear ill.   HENT:   Head: Normocephalic and atraumatic.   Nose: Nose normal.   Eyes: Pupils are equal, round, and reactive to light. Conjunctivae are normal. Right eye exhibits no discharge. Left eye exhibits no discharge. No scleral icterus.   Neck: Trachea normal and phonation normal. No thyromegaly present.       Cardiovascular: Normal rate, regular rhythm and normal heart sounds. Exam reveals no gallop and no friction rub.   No murmur heard.  Pulmonary/Chest: Effort normal and breath sounds normal. No respiratory distress. He has no wheezes. He has no rales.   Abdominal: Soft. Normal appearance and bowel sounds are normal. He exhibits no distension. There is no abdominal tenderness. There is no rebound and no guarding.   Musculoskeletal: Normal range of motion.      Lumbar back: Pain: chronic, rates pain a 5.     Vascular Status -  His right foot exhibits abnormal foot vasculature . His right foot exhibits no edema. His left foot exhibits abnormal foot vasculature . His left foot exhibits no edema.  Lymphadenopathy:     He has no cervical adenopathy.   Neurological: No cranial nerve deficit.   Skin: Skin is warm and dry.   Nursing note and vitals reviewed.    Assessment/Plan   Diagnoses and all orders for this visit:    1. Bipolar 1 disorder, mixed, partial remission (HCC) (Primary)    2. Essential hypertension    3. Anxiety    4. Type II diabetes mellitus with complication, uncontrolled (HCC)    5. Obesity, Class III, BMI 40-49.9 (morbid obesity) (HCC)     20 mild 100% agree with not taking the Xanax often. It is okay for him to keep for panic attacks if needed    Discussed the nature of the bipolar disorder and the need for continued treatment, as above and  for now he has agreed to continue taking meds.    Diabetes, hypertension both are doing much better, staying closer to goal now that he is compliant with treatment.    Discussed the patient's BMI with him. BMI is above normal parameters. Follow-up plan includes:  educational material and nutrition counseling.    I spent 30 minutes caring for Kuldip on this date of service. This time includes time spent by me in the following activities:preparing for the visit, obtaining and/or reviewing a separately obtained history, performing a medically appropriate examination and/or evaluation , counseling and educating the patient/family/caregiver, ordering medications, tests, or procedures and documenting information in the medical record          This document has been electronically signed by Aura Carrillo MD on October 28, 2021 08:17 CDT

## 2022-01-05 ENCOUNTER — OFFICE VISIT (OUTPATIENT)
Dept: FAMILY MEDICINE CLINIC | Facility: CLINIC | Age: 47
End: 2022-01-05

## 2022-01-05 VITALS
BODY MASS INDEX: 38.6 KG/M2 | HEART RATE: 89 BPM | HEIGHT: 72 IN | WEIGHT: 285 LBS | OXYGEN SATURATION: 98 % | RESPIRATION RATE: 16 BRPM | TEMPERATURE: 97.3 F | SYSTOLIC BLOOD PRESSURE: 132 MMHG | DIASTOLIC BLOOD PRESSURE: 84 MMHG

## 2022-01-05 DIAGNOSIS — E66.9 OBESITY, CLASS II, BMI 35-39.9: Primary | ICD-10-CM

## 2022-01-05 DIAGNOSIS — F41.9 ANXIETY: ICD-10-CM

## 2022-01-05 DIAGNOSIS — IMO0002 TYPE II DIABETES MELLITUS WITH COMPLICATION, UNCONTROLLED: ICD-10-CM

## 2022-01-05 PROCEDURE — 99214 OFFICE O/P EST MOD 30 MIN: CPT | Performed by: FAMILY MEDICINE

## 2022-01-05 RX ORDER — DULAGLUTIDE 1.5 MG/.5ML
1.5 INJECTION, SOLUTION SUBCUTANEOUS
Qty: 2 ML | Refills: 5 | Status: SHIPPED | OUTPATIENT
Start: 2022-01-05 | End: 2022-01-05

## 2022-01-05 RX ORDER — ALPRAZOLAM 1 MG/1
1 TABLET ORAL NIGHTLY PRN
Qty: 30 TABLET | Refills: 2 | Status: SHIPPED | OUTPATIENT
Start: 2022-01-05 | End: 2022-04-05 | Stop reason: SDUPTHER

## 2022-01-05 RX ORDER — DULAGLUTIDE 3 MG/.5ML
3 INJECTION, SOLUTION SUBCUTANEOUS WEEKLY
Qty: 2 ML | Refills: 1 | Status: SHIPPED | OUTPATIENT
Start: 2022-01-05 | End: 2022-02-24 | Stop reason: SDUPTHER

## 2022-01-05 NOTE — PROGRESS NOTES
Subjective   Kuldip Bossman Castaneda is a 46 y.o. male.  Here in the office today for follow-up on type 2 diabetes, anxiety, bipolar disorder.  He has stopped taking the Vraylar is it kept him awake for the better part of 3 to 4 weeks.  He tapered off of it and now is sleeping well.  He says that his wife has noticed improvement since he is off the medicine and he does not feel he needs anything for mood right now.  I did discuss with him my concerns about his history of bipolar disorder and being off meds possibly triggering of flareup of either eddie or depression but truly he feels he does not need anything right now and would like to have a refill of Xanax to take just for panic attacks and severe anxiety    His blood sugars are doing okay and he tolerates the Trulicity.  He has been on the 1.5 mg dose.  He would really like to see about losing weight and we talked about increasing the dose of the Trulicity as I think this would help.    History of Present Illness  Hypertension   This is a chronic problem. The current episode started more than 1 year ago. The problem has been waxing and waning since onset. Associated symptoms include anxiety, headaches and malaise/fatigue. Pertinent negatives include no blurred vision, chest pain, neck pain, orthopnea, palpitations, peripheral edema, PND, shortness of breath or sweats. There are no associated agents to hypertension. Risk factors for coronary artery disease include dyslipidemia and family history. Past treatments include beta-blockers, ACE inhibitors, alpha agonist.   The current treatment provides moderate improvement. There are no compliance problems.    Anxiety   Presents for follow-up visit. Symptoms include decreased concentration, depressed mood, excessive worry, irritability, muscle tension, nervous/anxious behavior, palpitations, panic and restlessness. Patient reports no chest pain, compulsions, confusion, dizziness, dry mouth, feeling of choking,  "hyperventilation, impotence, insomnia, malaise, nausea, obsessions, shortness of breath or suicidal ideas. Symptoms occur most days. The severity of symptoms is severe and causing significant distress. The quality of sleep is fair. Nighttime awakenings: occasional.     Compliance with medications is %.     The following portions of the patient's history were reviewed and updated as appropriate: allergies, current medications, past family history, past medical history, past social history, past surgical history and problem list.    Review of Systems   HENT: Negative for sneezing and trouble swallowing.    Eyes: Negative for visual disturbance.   Respiratory: Negative for chest tightness and wheezing.    Cardiovascular: Negative for leg swelling.   Gastrointestinal: Positive for abdominal pain.   Genitourinary: Negative for urgency.   Musculoskeletal: Negative for back pain.   Psychiatric/Behavioral: Positive for hallucinations.            All other systems reviewed and are negative.      Objective    Vitals:    01/05/22 0921   BP: 132/84   BP Location: Right arm   Patient Position: Sitting   Cuff Size: Adult   Pulse: 89   Resp: 16   Temp: 97.3 °F (36.3 °C)   SpO2: 98%   Weight: 129 kg (285 lb)   Height: 182.9 cm (72\")   PainSc: 0-No pain     Body mass index is 38.65 kg/m².    Physical Exam   Constitutional: He appears well-developed. He is cooperative. He does not appear ill.   HENT:   Head: Normocephalic and atraumatic.   Nose: Nose normal.   Eyes: Pupils are equal, round, and reactive to light. Conjunctivae are normal. Right eye exhibits no discharge. Left eye exhibits no discharge. No scleral icterus.   Neck: Trachea normal and phonation normal. No thyromegaly present.       Cardiovascular: Normal rate, regular rhythm and normal heart sounds. Exam reveals no gallop and no friction rub.   No murmur heard.  Pulmonary/Chest: Effort normal and breath sounds normal. No respiratory distress. He has no wheezes. He " has no rales.   Abdominal: Soft. Normal appearance and bowel sounds are normal. He exhibits no distension. There is no abdominal tenderness. There is no rebound and no guarding.   Musculoskeletal: Normal range of motion.      Lumbar back: Pain: chronic, rates pain a 5.     Vascular Status -  His right foot exhibits abnormal foot vasculature . His right foot exhibits no edema. His left foot exhibits abnormal foot vasculature . His left foot exhibits no edema.  Lymphadenopathy:     He has no cervical adenopathy.   Neurological: No cranial nerve deficit.   Skin: Skin is warm and dry.   Nursing note and vitals reviewed.    Assessment/Plan   Diagnoses and all orders for this visit:    1. Obesity, Class II, BMI 35-39.9 (Primary)    2. Anxiety  -     ALPRAZolam (XANAX) 1 MG tablet; Take 1 tablet by mouth At Night As Needed for Anxiety.  Dispense: 30 tablet; Refill: 2    3. Type II diabetes mellitus with complication, uncontrolled (HCC)  -     Discontinue: Dulaglutide (Trulicity) 1.5 MG/0.5ML solution pen-injector; Inject 1.5 mg (1 syringe) under the skin into the appropriate area as directed Every 7 (Seven) Days.  Dispense: 2 mL; Refill: 5  -     Dulaglutide (Trulicity) 3 MG/0.5ML solution pen-injector; Inject 3 mg (1 pen) under the skin into the appropriate area as directed 1 (One) Time Per Week.  Dispense: 2 mL; Refill: 1    Will continue on Xanax only when needed for panic attacks.  If he feels that he is having even the slightest symptoms of eddie or depression returning contact me does consider getting back on medications    Increase Trulicity to 3 mg and consider going on it to a higher dose as tolerated each month.  Hopefully this will help with the weight and the diabetes.    Discussed the patient's BMI with him. BMI is above normal parameters. Follow-up plan includes:  educational material and nutrition counseling.    I spent 30 minutes caring for Kuldip on this date of service. This time includes time spent by me  in the following activities:preparing for the visit, obtaining and/or reviewing a separately obtained history, performing a medically appropriate examination and/or evaluation , counseling and educating the patient/family/caregiver, ordering medications, tests, or procedures and documenting information in the medical record        This document has been electronically signed by Aura Carrillo MD on January 5, 2022 09:33 CST

## 2022-02-24 DIAGNOSIS — IMO0002 TYPE II DIABETES MELLITUS WITH COMPLICATION, UNCONTROLLED: ICD-10-CM

## 2022-02-24 RX ORDER — DULAGLUTIDE 3 MG/.5ML
3 INJECTION, SOLUTION SUBCUTANEOUS WEEKLY
Qty: 2 ML | Refills: 1 | Status: SHIPPED | OUTPATIENT
Start: 2022-02-24 | End: 2022-04-05

## 2022-03-23 RX ORDER — LEVOTHYROXINE SODIUM 0.05 MG/1
50 TABLET ORAL DAILY
Qty: 90 TABLET | Refills: 1 | Status: SHIPPED | OUTPATIENT
Start: 2022-03-23 | End: 2022-10-06 | Stop reason: SDUPTHER

## 2022-04-05 ENCOUNTER — OFFICE VISIT (OUTPATIENT)
Dept: FAMILY MEDICINE CLINIC | Facility: CLINIC | Age: 47
End: 2022-04-05

## 2022-04-05 VITALS
HEIGHT: 72 IN | DIASTOLIC BLOOD PRESSURE: 88 MMHG | WEIGHT: 285 LBS | HEART RATE: 82 BPM | BODY MASS INDEX: 38.6 KG/M2 | OXYGEN SATURATION: 97 % | SYSTOLIC BLOOD PRESSURE: 150 MMHG | TEMPERATURE: 98.1 F

## 2022-04-05 DIAGNOSIS — E11.43 TYPE 2 DIABETES MELLITUS WITH DIABETIC AUTONOMIC NEUROPATHY, WITHOUT LONG-TERM CURRENT USE OF INSULIN: ICD-10-CM

## 2022-04-05 DIAGNOSIS — E66.9 OBESITY, CLASS II, BMI 35-39.9: ICD-10-CM

## 2022-04-05 DIAGNOSIS — G47.00 INSOMNIA, UNSPECIFIED TYPE: ICD-10-CM

## 2022-04-05 DIAGNOSIS — F31.77 BIPOLAR 1 DISORDER, MIXED, PARTIAL REMISSION: ICD-10-CM

## 2022-04-05 DIAGNOSIS — F41.9 ANXIETY: Primary | ICD-10-CM

## 2022-04-05 PROCEDURE — 99214 OFFICE O/P EST MOD 30 MIN: CPT | Performed by: FAMILY MEDICINE

## 2022-04-05 RX ORDER — QUETIAPINE FUMARATE 25 MG/1
25 TABLET, FILM COATED ORAL NIGHTLY
Qty: 30 TABLET | Refills: 5 | Status: SHIPPED | OUTPATIENT
Start: 2022-04-05 | End: 2022-05-13 | Stop reason: HOSPADM

## 2022-04-05 RX ORDER — ALPRAZOLAM 1 MG/1
1 TABLET ORAL NIGHTLY PRN
Qty: 30 TABLET | Refills: 2 | Status: SHIPPED | OUTPATIENT
Start: 2022-04-05 | End: 2022-05-13 | Stop reason: HOSPADM

## 2022-04-05 RX ORDER — DULAGLUTIDE 4.5 MG/.5ML
4.5 INJECTION, SOLUTION SUBCUTANEOUS WEEKLY
Qty: 2 ML | Refills: 5 | Status: SHIPPED | OUTPATIENT
Start: 2022-04-05 | End: 2022-10-06 | Stop reason: SDUPTHER

## 2022-04-05 NOTE — PROGRESS NOTES
Subjective   Kuldip Castaneda is a 46 y.o. male.  With type 2 diabetes, bipolar disorder here today for follow-up and refills.  He is ready to go up on the dose of Trulicity.  Feels he was losing weight pretty well on it but it sort of plateaued off.  He is tolerated it well and his sugars have been doing well initially.  He feels he could do a bit better with his diet right now.    He takes Xanax when needed for severe anxiety has and Seroquel for insomnia.  Overall he feels his mental health medications are adjusted well at the moment.    He will be due for labs next visit.    Anxiety          Hypertension   This is a chronic problem. The current episode started more than 1 year ago. The problem has been waxing and waning since onset. Associated symptoms include anxiety, headaches and malaise/fatigue. Pertinent negatives include no blurred vision, chest pain, neck pain, orthopnea, palpitations, peripheral edema, PND, shortness of breath or sweats. There are no associated agents to hypertension. Risk factors for coronary artery disease include dyslipidemia and family history. Past treatments include beta-blockers, ACE inhibitors, alpha agonist.   The current treatment provides moderate improvement. There are no compliance problems.    Anxiety   Presents for follow-up visit. Symptoms include decreased concentration, depressed mood, excessive worry, irritability, muscle tension, nervous/anxious behavior, palpitations, panic and restlessness. Patient reports no chest pain, compulsions, confusion, dizziness, dry mouth, feeling of choking, hyperventilation, impotence, insomnia, malaise, nausea, obsessions, shortness of breath or suicidal ideas. Symptoms occur most days. The severity of symptoms is severe and causing significant distress. The quality of sleep is fair. Nighttime awakenings: occasional.     Compliance with medications is %.     The following portions of the patient's history were reviewed and  "updated as appropriate: allergies, current medications, past family history, past medical history, past social history, past surgical history and problem list.    Review of Systems   HENT: Negative for sneezing and trouble swallowing.    Eyes: Negative for visual disturbance.   Respiratory: Negative for chest tightness and wheezing.    Cardiovascular: Negative for leg swelling.   Gastrointestinal: Positive for abdominal pain.   Genitourinary: Negative for urgency.   Musculoskeletal: Negative for back pain.   Psychiatric/Behavioral: Positive for hallucinations.            All other systems reviewed and are negative.      Objective    Vitals:    04/05/22 0939   BP: 150/88   Pulse: 82   Temp: 98.1 °F (36.7 °C)   SpO2: 97%   Weight: 129 kg (285 lb)   Height: 182.9 cm (72\")   PainSc: 0-No pain     Body mass index is 38.65 kg/m².    Physical Exam   Constitutional: He appears well-developed. He is cooperative. He does not appear ill.   HENT:   Head: Normocephalic and atraumatic.   Nose: Nose normal.   Eyes: Pupils are equal, round, and reactive to light. Conjunctivae are normal. Right eye exhibits no discharge. Left eye exhibits no discharge. No scleral icterus.   Neck: Trachea normal and phonation normal. No thyromegaly present.       Cardiovascular: Normal rate, regular rhythm and normal heart sounds. Exam reveals no gallop and no friction rub.   No murmur heard.  Pulmonary/Chest: Effort normal and breath sounds normal. No respiratory distress. He has no wheezes. He has no rales.   Abdominal: Soft. Normal appearance and bowel sounds are normal. He exhibits no distension. There is no abdominal tenderness. There is no rebound and no guarding.   Musculoskeletal: Normal range of motion.      Lumbar back: Pain: chronic, rates pain a 5.     Vascular Status -  His right foot exhibits abnormal foot vasculature . His right foot exhibits no edema. His left foot exhibits abnormal foot vasculature . His left foot exhibits no " edema.  Lymphadenopathy:     He has no cervical adenopathy.   Neurological: No cranial nerve deficit.   Skin: Skin is warm and dry.   Nursing note and vitals reviewed.    Assessment/Plan   Diagnoses and all orders for this visit:    1. Anxiety (Primary)  -     ALPRAZolam (XANAX) 1 MG tablet; Take 1 tablet by mouth At Night As Needed for Anxiety.  Dispense: 30 tablet; Refill: 2    2. Insomnia, unspecified type  -     QUEtiapine (SEROquel) 25 MG tablet; Take 1 tablet by mouth Every Night.  Dispense: 30 tablet; Refill: 5    3. Bipolar 1 disorder, mixed, partial remission (HCC)    4. Obesity, Class II, BMI 35-39.9    5. Type 2 diabetes mellitus with diabetic autonomic neuropathy, without long-term current use of insulin ([facility])  -     Dulaglutide (Trulicity) 4.5 MG/0.5ML solution pen-injector; Inject 4.5 mg (1 pen) under the skin into the appropriate area as directed 1 (One) Time Per Week.  Dispense: 2 mL; Refill: 5  -     Hemoglobin A1c  -     Comprehensive Metabolic Panel  -     CBC & Differential; Future  -     LDL Cholesterol, Direct; Future    Will continue on Xanax only when needed for panic attacks.      We will increase Trulicity on to 4.5 mg, and encourage dietary compliance.  We will get A1c next visit    We will continue on Seroquel when needed for insomnia, mood stabilization    Discussed the patient's BMI with him. BMI is above normal parameters. Follow-up plan includes:  educational material and nutrition counseling.    I spent 30 minutes caring for Kuldip on this date of service. This time includes time spent by me in the following activities:preparing for the visit, obtaining and/or reviewing a separately obtained history, performing a medically appropriate examination and/or evaluation , counseling and educating the patient/family/caregiver, ordering medications, tests, or procedures and documenting information in the medical record        This document has been electronically signed by Aura Carrillo  MD on April 5, 2022 10:17 CDT

## 2022-05-07 ENCOUNTER — HOSPITAL ENCOUNTER (EMERGENCY)
Facility: HOSPITAL | Age: 47
Discharge: PSYCHIATRIC OR SUBSTANCE USE FACILITY | End: 2022-05-08
Attending: EMERGENCY MEDICINE | Admitting: EMERGENCY MEDICINE

## 2022-05-07 DIAGNOSIS — F29 PSYCHOSIS, UNSPECIFIED PSYCHOSIS TYPE: Primary | ICD-10-CM

## 2022-05-07 LAB
ALBUMIN SERPL-MCNC: 4.6 G/DL (ref 3.5–5.2)
ALBUMIN/GLOB SERPL: 1.3 G/DL
ALP SERPL-CCNC: 62 U/L (ref 39–117)
ALT SERPL W P-5'-P-CCNC: 28 U/L (ref 1–41)
AMPHET+METHAMPHET UR QL: NEGATIVE
AMPHETAMINES UR QL: NEGATIVE
ANION GAP SERPL CALCULATED.3IONS-SCNC: 19 MMOL/L (ref 5–15)
APAP SERPL-MCNC: <5 MCG/ML (ref 0–30)
AST SERPL-CCNC: 23 U/L (ref 1–40)
BACTERIA UR QL AUTO: ABNORMAL /HPF
BARBITURATES UR QL SCN: NEGATIVE
BASOPHILS # BLD AUTO: 0.05 10*3/MM3 (ref 0–0.2)
BASOPHILS NFR BLD AUTO: 0.6 % (ref 0–1.5)
BENZODIAZ UR QL SCN: POSITIVE
BILIRUB SERPL-MCNC: 0.5 MG/DL (ref 0–1.2)
BILIRUB UR QL STRIP: NEGATIVE
BUN SERPL-MCNC: 12 MG/DL (ref 6–20)
BUN/CREAT SERPL: 8.4 (ref 7–25)
BUPRENORPHINE SERPL-MCNC: NEGATIVE NG/ML
CALCIUM SPEC-SCNC: 9.7 MG/DL (ref 8.6–10.5)
CANNABINOIDS SERPL QL: POSITIVE
CHLORIDE SERPL-SCNC: 98 MMOL/L (ref 98–107)
CLARITY UR: CLEAR
CO2 SERPL-SCNC: 21 MMOL/L (ref 22–29)
COCAINE UR QL: NEGATIVE
COLOR UR: YELLOW
CREAT SERPL-MCNC: 1.43 MG/DL (ref 0.76–1.27)
DEPRECATED RDW RBC AUTO: 43.1 FL (ref 37–54)
EGFRCR SERPLBLD CKD-EPI 2021: 61.2 ML/MIN/1.73
EOSINOPHIL # BLD AUTO: 0.05 10*3/MM3 (ref 0–0.4)
EOSINOPHIL NFR BLD AUTO: 0.6 % (ref 0.3–6.2)
ERYTHROCYTE [DISTWIDTH] IN BLOOD BY AUTOMATED COUNT: 14 % (ref 12.3–15.4)
ETHANOL BLD-MCNC: <10 MG/DL (ref 0–10)
ETHANOL UR QL: <0.01 %
FLUAV RNA RESP QL NAA+PROBE: NOT DETECTED
FLUBV RNA RESP QL NAA+PROBE: NOT DETECTED
GLOBULIN UR ELPH-MCNC: 3.6 GM/DL
GLUCOSE BLDC GLUCOMTR-MCNC: 276 MG/DL (ref 70–130)
GLUCOSE SERPL-MCNC: 301 MG/DL (ref 65–99)
GLUCOSE UR STRIP-MCNC: ABNORMAL MG/DL
HCT VFR BLD AUTO: 40.6 % (ref 37.5–51)
HGB BLD-MCNC: 14.4 G/DL (ref 13–17.7)
HGB UR QL STRIP.AUTO: ABNORMAL
HOLD SPECIMEN: NORMAL
HOLD SPECIMEN: NORMAL
HYALINE CASTS UR QL AUTO: ABNORMAL /LPF
IMM GRANULOCYTES # BLD AUTO: 0.04 10*3/MM3 (ref 0–0.05)
IMM GRANULOCYTES NFR BLD AUTO: 0.4 % (ref 0–0.5)
KETONES UR QL STRIP: ABNORMAL
LEUKOCYTE ESTERASE UR QL STRIP.AUTO: NEGATIVE
LYMPHOCYTES # BLD AUTO: 2.47 10*3/MM3 (ref 0.7–3.1)
LYMPHOCYTES NFR BLD AUTO: 27.3 % (ref 19.6–45.3)
MAGNESIUM SERPL-MCNC: 1.4 MG/DL (ref 1.6–2.6)
MCH RBC QN AUTO: 29.6 PG (ref 26.6–33)
MCHC RBC AUTO-ENTMCNC: 35.5 G/DL (ref 31.5–35.7)
MCV RBC AUTO: 83.5 FL (ref 79–97)
METHADONE UR QL SCN: NEGATIVE
MONOCYTES # BLD AUTO: 0.68 10*3/MM3 (ref 0.1–0.9)
MONOCYTES NFR BLD AUTO: 7.5 % (ref 5–12)
NEUTROPHILS NFR BLD AUTO: 5.76 10*3/MM3 (ref 1.7–7)
NEUTROPHILS NFR BLD AUTO: 63.6 % (ref 42.7–76)
NITRITE UR QL STRIP: NEGATIVE
NRBC BLD AUTO-RTO: 0 /100 WBC (ref 0–0.2)
OPIATES UR QL: NEGATIVE
OXYCODONE UR QL SCN: NEGATIVE
PCP UR QL SCN: NEGATIVE
PH UR STRIP.AUTO: 5.5 [PH] (ref 5–9)
PLATELET # BLD AUTO: 352 10*3/MM3 (ref 140–450)
PMV BLD AUTO: 9.5 FL (ref 6–12)
POTASSIUM SERPL-SCNC: 2.9 MMOL/L (ref 3.5–5.2)
PROPOXYPH UR QL: NEGATIVE
PROT SERPL-MCNC: 8.2 G/DL (ref 6–8.5)
PROT UR QL STRIP: ABNORMAL
RBC # BLD AUTO: 4.86 10*6/MM3 (ref 4.14–5.8)
RBC # UR STRIP: ABNORMAL /HPF
REF LAB TEST METHOD: ABNORMAL
SALICYLATES SERPL-MCNC: <0.3 MG/DL
SARS-COV-2 RNA RESP QL NAA+PROBE: NOT DETECTED
SODIUM SERPL-SCNC: 138 MMOL/L (ref 136–145)
SP GR UR STRIP: 1.02 (ref 1–1.03)
SQUAMOUS #/AREA URNS HPF: ABNORMAL /HPF
TRICYCLICS UR QL SCN: NEGATIVE
TSH SERPL DL<=0.05 MIU/L-ACNC: 3.85 UIU/ML (ref 0.27–4.2)
UROBILINOGEN UR QL STRIP: ABNORMAL
WBC # UR STRIP: ABNORMAL /HPF
WBC NRBC COR # BLD: 9.05 10*3/MM3 (ref 3.4–10.8)
WHOLE BLOOD HOLD SPECIMEN: NORMAL
WHOLE BLOOD HOLD SPECIMEN: NORMAL

## 2022-05-07 PROCEDURE — 96375 TX/PRO/DX INJ NEW DRUG ADDON: CPT

## 2022-05-07 PROCEDURE — 82962 GLUCOSE BLOOD TEST: CPT

## 2022-05-07 PROCEDURE — 87636 SARSCOV2 & INF A&B AMP PRB: CPT | Performed by: EMERGENCY MEDICINE

## 2022-05-07 PROCEDURE — 96366 THER/PROPH/DIAG IV INF ADDON: CPT

## 2022-05-07 PROCEDURE — 80143 DRUG ASSAY ACETAMINOPHEN: CPT | Performed by: EMERGENCY MEDICINE

## 2022-05-07 PROCEDURE — 83735 ASSAY OF MAGNESIUM: CPT | Performed by: EMERGENCY MEDICINE

## 2022-05-07 PROCEDURE — 93010 ELECTROCARDIOGRAM REPORT: CPT | Performed by: INTERNAL MEDICINE

## 2022-05-07 PROCEDURE — 80179 DRUG ASSAY SALICYLATE: CPT | Performed by: EMERGENCY MEDICINE

## 2022-05-07 PROCEDURE — 80050 GENERAL HEALTH PANEL: CPT | Performed by: EMERGENCY MEDICINE

## 2022-05-07 PROCEDURE — 25010000002 LORAZEPAM PER 2 MG

## 2022-05-07 PROCEDURE — 96365 THER/PROPH/DIAG IV INF INIT: CPT

## 2022-05-07 PROCEDURE — 82077 ASSAY SPEC XCP UR&BREATH IA: CPT | Performed by: EMERGENCY MEDICINE

## 2022-05-07 PROCEDURE — 99285 EMERGENCY DEPT VISIT HI MDM: CPT

## 2022-05-07 PROCEDURE — 93005 ELECTROCARDIOGRAM TRACING: CPT | Performed by: EMERGENCY MEDICINE

## 2022-05-07 PROCEDURE — 80306 DRUG TEST PRSMV INSTRMNT: CPT | Performed by: EMERGENCY MEDICINE

## 2022-05-07 PROCEDURE — 25010000002 MAGNESIUM SULFATE 2 GM/50ML SOLUTION: Performed by: EMERGENCY MEDICINE

## 2022-05-07 PROCEDURE — 36415 COLL VENOUS BLD VENIPUNCTURE: CPT

## 2022-05-07 PROCEDURE — 81001 URINALYSIS AUTO W/SCOPE: CPT | Performed by: EMERGENCY MEDICINE

## 2022-05-07 RX ORDER — MAGNESIUM SULFATE HEPTAHYDRATE 40 MG/ML
2 INJECTION, SOLUTION INTRAVENOUS ONCE
Status: COMPLETED | OUTPATIENT
Start: 2022-05-07 | End: 2022-05-07

## 2022-05-07 RX ORDER — LORAZEPAM 2 MG/ML
1 INJECTION INTRAMUSCULAR ONCE
Status: COMPLETED | OUTPATIENT
Start: 2022-05-07 | End: 2022-05-07

## 2022-05-07 RX ORDER — CLONIDINE HYDROCHLORIDE 0.1 MG/1
0.1 TABLET ORAL ONCE
Status: COMPLETED | OUTPATIENT
Start: 2022-05-07 | End: 2022-05-07

## 2022-05-07 RX ORDER — LABETALOL HYDROCHLORIDE 5 MG/ML
10 INJECTION, SOLUTION INTRAVENOUS ONCE
Status: COMPLETED | OUTPATIENT
Start: 2022-05-07 | End: 2022-05-07

## 2022-05-07 RX ORDER — LORAZEPAM 2 MG/ML
INJECTION INTRAMUSCULAR
Status: COMPLETED
Start: 2022-05-07 | End: 2022-05-07

## 2022-05-07 RX ORDER — POTASSIUM CHLORIDE 750 MG/1
40 CAPSULE, EXTENDED RELEASE ORAL ONCE
Status: COMPLETED | OUTPATIENT
Start: 2022-05-07 | End: 2022-05-07

## 2022-05-07 RX ORDER — SODIUM CHLORIDE 0.9 % (FLUSH) 0.9 %
10 SYRINGE (ML) INJECTION AS NEEDED
Status: DISCONTINUED | OUTPATIENT
Start: 2022-05-07 | End: 2022-05-08 | Stop reason: HOSPADM

## 2022-05-07 RX ORDER — ENALAPRILAT 2.5 MG/2ML
1.25 INJECTION INTRAVENOUS ONCE
Status: COMPLETED | OUTPATIENT
Start: 2022-05-07 | End: 2022-05-07

## 2022-05-07 RX ADMIN — POTASSIUM CHLORIDE 40 MEQ: 750 CAPSULE, EXTENDED RELEASE ORAL at 21:39

## 2022-05-07 RX ADMIN — MAGNESIUM SULFATE HEPTAHYDRATE 2 G: 40 INJECTION, SOLUTION INTRAVENOUS at 21:07

## 2022-05-07 RX ADMIN — LORAZEPAM 1 MG: 2 INJECTION INTRAMUSCULAR; INTRAVENOUS at 20:26

## 2022-05-07 RX ADMIN — CLONIDINE HYDROCHLORIDE 0.1 MG: 0.1 TABLET ORAL at 20:26

## 2022-05-07 RX ADMIN — LORAZEPAM 1 MG: 2 INJECTION INTRAMUSCULAR at 20:26

## 2022-05-07 RX ADMIN — ENALAPRILAT 1.25 MG: 1.25 INJECTION INTRAVENOUS at 23:57

## 2022-05-07 RX ADMIN — LABETALOL HYDROCHLORIDE 10 MG: 5 INJECTION, SOLUTION INTRAVENOUS at 22:56

## 2022-05-08 ENCOUNTER — HOSPITAL ENCOUNTER (INPATIENT)
Facility: HOSPITAL | Age: 47
LOS: 5 days | Discharge: HOME | End: 2022-05-13
Attending: PSYCHIATRY & NEUROLOGY | Admitting: PSYCHIATRY & NEUROLOGY

## 2022-05-08 VITALS
BODY MASS INDEX: 39.28 KG/M2 | TEMPERATURE: 97.7 F | RESPIRATION RATE: 18 BRPM | OXYGEN SATURATION: 97 % | DIASTOLIC BLOOD PRESSURE: 81 MMHG | HEART RATE: 88 BPM | HEIGHT: 72 IN | WEIGHT: 290 LBS | SYSTOLIC BLOOD PRESSURE: 168 MMHG

## 2022-05-08 DIAGNOSIS — E08.00 DIABETES MELLITUS DUE TO UNDERLYING CONDITION WITH HYPEROSMOLARITY WITHOUT COMA, UNSPECIFIED WHETHER LONG TERM INSULIN USE: ICD-10-CM

## 2022-05-08 DIAGNOSIS — I10 ESSENTIAL HYPERTENSION: ICD-10-CM

## 2022-05-08 DIAGNOSIS — F25.0 SCHIZOAFFECTIVE DISORDER, BIPOLAR TYPE: Primary | ICD-10-CM

## 2022-05-08 PROBLEM — E03.9 HYPOTHYROID: Status: ACTIVE | Noted: 2022-05-08

## 2022-05-08 PROBLEM — F29 PSYCHOSIS (HCC): Status: ACTIVE | Noted: 2022-05-08

## 2022-05-08 LAB
QT INTERVAL: 304 MS
QTC INTERVAL: 460 MS

## 2022-05-08 PROCEDURE — 90792 PSYCH DIAG EVAL W/MED SRVCS: CPT | Performed by: PSYCHIATRY & NEUROLOGY

## 2022-05-08 PROCEDURE — 96375 TX/PRO/DX INJ NEW DRUG ADDON: CPT

## 2022-05-08 PROCEDURE — 25010000002 HYDRALAZINE PER 20 MG: Performed by: EMERGENCY MEDICINE

## 2022-05-08 PROCEDURE — 82962 GLUCOSE BLOOD TEST: CPT

## 2022-05-08 RX ORDER — ATORVASTATIN CALCIUM 20 MG/1
20 TABLET, FILM COATED ORAL NIGHTLY
Status: DISCONTINUED | OUTPATIENT
Start: 2022-05-08 | End: 2022-05-13 | Stop reason: HOSPADM

## 2022-05-08 RX ORDER — NICOTINE POLACRILEX 4 MG
15 LOZENGE BUCCAL
Status: DISCONTINUED | OUTPATIENT
Start: 2022-05-08 | End: 2022-05-08

## 2022-05-08 RX ORDER — LORAZEPAM 2 MG/1
2 TABLET ORAL ONCE
Status: COMPLETED | OUTPATIENT
Start: 2022-05-08 | End: 2022-05-08

## 2022-05-08 RX ORDER — METOPROLOL SUCCINATE 50 MG/1
100 TABLET, EXTENDED RELEASE ORAL DAILY
Status: DISCONTINUED | OUTPATIENT
Start: 2022-05-08 | End: 2022-05-13 | Stop reason: HOSPADM

## 2022-05-08 RX ORDER — HYDRALAZINE HYDROCHLORIDE 20 MG/ML
20 INJECTION INTRAMUSCULAR; INTRAVENOUS ONCE
Status: COMPLETED | OUTPATIENT
Start: 2022-05-08 | End: 2022-05-08

## 2022-05-08 RX ORDER — ENALAPRIL MALEATE 10 MG/1
20 TABLET ORAL ONCE
Status: COMPLETED | OUTPATIENT
Start: 2022-05-08 | End: 2022-05-08

## 2022-05-08 RX ORDER — ALUMINA, MAGNESIA, AND SIMETHICONE 2400; 2400; 240 MG/30ML; MG/30ML; MG/30ML
15 SUSPENSION ORAL EVERY 6 HOURS PRN
Status: DISCONTINUED | OUTPATIENT
Start: 2022-05-08 | End: 2022-05-13 | Stop reason: HOSPADM

## 2022-05-08 RX ORDER — OLANZAPINE 5 MG/1
10 TABLET, ORALLY DISINTEGRATING ORAL 2 TIMES DAILY PRN
Status: DISCONTINUED | OUTPATIENT
Start: 2022-05-08 | End: 2022-05-13 | Stop reason: HOSPADM

## 2022-05-08 RX ORDER — ASENAPINE 5 MG/1
5 TABLET SUBLINGUAL 2 TIMES DAILY
Status: DISCONTINUED | OUTPATIENT
Start: 2022-05-08 | End: 2022-05-09

## 2022-05-08 RX ORDER — METOPROLOL SUCCINATE 100 MG/1
100 TABLET, EXTENDED RELEASE ORAL ONCE
Status: COMPLETED | OUTPATIENT
Start: 2022-05-08 | End: 2022-05-08

## 2022-05-08 RX ORDER — TRAZODONE HYDROCHLORIDE 50 MG/1
50 TABLET ORAL NIGHTLY PRN
Status: DISCONTINUED | OUTPATIENT
Start: 2022-05-08 | End: 2022-05-13 | Stop reason: HOSPADM

## 2022-05-08 RX ORDER — ONDANSETRON 4 MG/1
4 TABLET, FILM COATED ORAL EVERY 6 HOURS PRN
Status: DISCONTINUED | OUTPATIENT
Start: 2022-05-08 | End: 2022-05-13 | Stop reason: HOSPADM

## 2022-05-08 RX ORDER — AMLODIPINE BESYLATE 5 MG/1
5 TABLET ORAL DAILY
Status: DISCONTINUED | OUTPATIENT
Start: 2022-05-09 | End: 2022-05-13 | Stop reason: HOSPADM

## 2022-05-08 RX ORDER — CLONAZEPAM 0.5 MG/1
1 TABLET ORAL NIGHTLY
Status: DISCONTINUED | OUTPATIENT
Start: 2022-05-08 | End: 2022-05-09

## 2022-05-08 RX ORDER — DEXTROSE MONOHYDRATE 25 G/50ML
25 INJECTION, SOLUTION INTRAVENOUS
Status: DISCONTINUED | OUTPATIENT
Start: 2022-05-08 | End: 2022-05-13 | Stop reason: HOSPADM

## 2022-05-08 RX ORDER — CLONIDINE HYDROCHLORIDE 0.1 MG/1
0.1 TABLET ORAL 3 TIMES DAILY
Status: DISCONTINUED | OUTPATIENT
Start: 2022-05-08 | End: 2022-05-13 | Stop reason: HOSPADM

## 2022-05-08 RX ORDER — ZIPRASIDONE HYDROCHLORIDE 20 MG/1
20 CAPSULE ORAL ONCE
Status: COMPLETED | OUTPATIENT
Start: 2022-05-08 | End: 2022-05-08

## 2022-05-08 RX ORDER — OLANZAPINE 5 MG/1
10 TABLET, ORALLY DISINTEGRATING ORAL ONCE
Status: COMPLETED | OUTPATIENT
Start: 2022-05-08 | End: 2022-05-08

## 2022-05-08 RX ORDER — ENALAPRIL MALEATE 10 MG/1
20 TABLET ORAL DAILY
Status: DISCONTINUED | OUTPATIENT
Start: 2022-05-08 | End: 2022-05-13 | Stop reason: HOSPADM

## 2022-05-08 RX ORDER — LOPERAMIDE HYDROCHLORIDE 2 MG/1
2 CAPSULE ORAL
Status: DISCONTINUED | OUTPATIENT
Start: 2022-05-08 | End: 2022-05-13 | Stop reason: HOSPADM

## 2022-05-08 RX ORDER — OLANZAPINE 5 MG/1
10 TABLET, ORALLY DISINTEGRATING ORAL NIGHTLY
Status: DISCONTINUED | OUTPATIENT
Start: 2022-05-08 | End: 2022-05-08

## 2022-05-08 RX ORDER — HYDROXYZINE PAMOATE 50 MG/1
50 CAPSULE ORAL EVERY 6 HOURS PRN
Status: DISCONTINUED | OUTPATIENT
Start: 2022-05-08 | End: 2022-05-13 | Stop reason: HOSPADM

## 2022-05-08 RX ORDER — INSULIN ASPART 100 [IU]/ML
0-9 INJECTION, SOLUTION INTRAVENOUS; SUBCUTANEOUS
Status: DISCONTINUED | OUTPATIENT
Start: 2022-05-08 | End: 2022-05-13 | Stop reason: HOSPADM

## 2022-05-08 RX ORDER — CLONIDINE HYDROCHLORIDE 0.1 MG/1
0.1 TABLET ORAL EVERY 4 HOURS PRN
Status: DISCONTINUED | OUTPATIENT
Start: 2022-05-08 | End: 2022-05-13 | Stop reason: HOSPADM

## 2022-05-08 RX ORDER — AMLODIPINE BESYLATE 5 MG/1
5 TABLET ORAL ONCE
Status: COMPLETED | OUTPATIENT
Start: 2022-05-08 | End: 2022-05-08

## 2022-05-08 RX ORDER — ACETAMINOPHEN 325 MG/1
650 TABLET ORAL EVERY 4 HOURS PRN
Status: DISCONTINUED | OUTPATIENT
Start: 2022-05-08 | End: 2022-05-13 | Stop reason: HOSPADM

## 2022-05-08 RX ORDER — CLONIDINE HYDROCHLORIDE 0.1 MG/1
0.1 TABLET ORAL ONCE
Status: COMPLETED | OUTPATIENT
Start: 2022-05-08 | End: 2022-05-08

## 2022-05-08 RX ORDER — LEVOTHYROXINE SODIUM 0.05 MG/1
50 TABLET ORAL
Status: DISCONTINUED | OUTPATIENT
Start: 2022-05-09 | End: 2022-05-13 | Stop reason: HOSPADM

## 2022-05-08 RX ADMIN — METOPROLOL SUCCINATE 100 MG: 100 TABLET, EXTENDED RELEASE ORAL at 05:53

## 2022-05-08 RX ADMIN — LORAZEPAM 2 MG: 2 TABLET ORAL at 07:32

## 2022-05-08 RX ADMIN — OLANZAPINE 10 MG: 5 TABLET, ORALLY DISINTEGRATING ORAL at 07:32

## 2022-05-08 RX ADMIN — ATORVASTATIN CALCIUM 20 MG: 20 TABLET, FILM COATED ORAL at 20:59

## 2022-05-08 RX ADMIN — ENALAPRIL MALEATE 20 MG: 10 TABLET ORAL at 15:15

## 2022-05-08 RX ADMIN — ASENAPINE MALEATE 5 MG: 5 TABLET SUBLINGUAL at 16:43

## 2022-05-08 RX ADMIN — CLONIDINE HYDROCHLORIDE 0.1 MG: 0.1 TABLET ORAL at 15:14

## 2022-05-08 RX ADMIN — HYDRALAZINE HYDROCHLORIDE 20 MG: 20 INJECTION INTRAMUSCULAR; INTRAVENOUS at 03:22

## 2022-05-08 RX ADMIN — METOPROLOL SUCCINATE 100 MG: 50 TABLET, FILM COATED, EXTENDED RELEASE ORAL at 15:13

## 2022-05-08 RX ADMIN — ENALAPRIL MALEATE 20 MG: 10 TABLET ORAL at 05:53

## 2022-05-08 RX ADMIN — CLONAZEPAM 1 MG: 0.5 TABLET ORAL at 20:55

## 2022-05-08 RX ADMIN — CLONIDINE HYDROCHLORIDE 0.1 MG: 0.1 TABLET ORAL at 01:12

## 2022-05-08 RX ADMIN — ZIPRASIDONE HYDROCHLORIDE 20 MG: 20 CAPSULE ORAL at 05:27

## 2022-05-08 RX ADMIN — ASENAPINE MALEATE 5 MG: 5 TABLET SUBLINGUAL at 20:54

## 2022-05-08 RX ADMIN — CLONIDINE HYDROCHLORIDE 0.1 MG: 0.1 TABLET ORAL at 20:55

## 2022-05-08 RX ADMIN — AMLODIPINE BESYLATE 5 MG: 5 TABLET ORAL at 05:53

## 2022-05-09 LAB
25(OH)D3 SERPL-MCNC: 17.8 NG/ML (ref 30–100)
FOLATE SERPL-MCNC: >20 NG/ML (ref 4.78–24.2)
GLUCOSE BLDC GLUCOMTR-MCNC: 121 MG/DL (ref 70–130)
GLUCOSE BLDC GLUCOMTR-MCNC: 129 MG/DL (ref 70–130)
GLUCOSE BLDC GLUCOMTR-MCNC: 144 MG/DL (ref 70–130)
GLUCOSE BLDC GLUCOMTR-MCNC: 152 MG/DL (ref 70–130)
GLUCOSE BLDC GLUCOMTR-MCNC: 160 MG/DL (ref 70–130)
GLUCOSE P FAST SERPL-MCNC: 135 MG/DL (ref 74–106)
VIT B12 BLD-MCNC: 351 PG/ML (ref 211–946)

## 2022-05-09 PROCEDURE — 82962 GLUCOSE BLOOD TEST: CPT

## 2022-05-09 PROCEDURE — 82947 ASSAY GLUCOSE BLOOD QUANT: CPT | Performed by: PSYCHIATRY & NEUROLOGY

## 2022-05-09 PROCEDURE — 63710000001 INSULIN ASPART PER 5 UNITS: Performed by: NURSE PRACTITIONER

## 2022-05-09 PROCEDURE — 82306 VITAMIN D 25 HYDROXY: CPT | Performed by: PSYCHIATRY & NEUROLOGY

## 2022-05-09 PROCEDURE — 82607 VITAMIN B-12: CPT | Performed by: PSYCHIATRY & NEUROLOGY

## 2022-05-09 PROCEDURE — 99232 SBSQ HOSP IP/OBS MODERATE 35: CPT | Performed by: NURSE PRACTITIONER

## 2022-05-09 PROCEDURE — 82746 ASSAY OF FOLIC ACID SERUM: CPT | Performed by: PSYCHIATRY & NEUROLOGY

## 2022-05-09 RX ORDER — ASENAPINE 10 MG/1
10 TABLET SUBLINGUAL 2 TIMES DAILY
Status: DISCONTINUED | OUTPATIENT
Start: 2022-05-09 | End: 2022-05-13 | Stop reason: HOSPADM

## 2022-05-09 RX ORDER — CLONAZEPAM 0.5 MG/1
1 TABLET ORAL 2 TIMES DAILY
Status: DISCONTINUED | OUTPATIENT
Start: 2022-05-09 | End: 2022-05-13 | Stop reason: HOSPADM

## 2022-05-09 RX ADMIN — CLONIDINE HYDROCHLORIDE 0.1 MG: 0.1 TABLET ORAL at 08:18

## 2022-05-09 RX ADMIN — ASENAPINE MALEATE 10 MG: 10 TABLET SUBLINGUAL at 20:36

## 2022-05-09 RX ADMIN — INSULIN ASPART 2 UNITS: 100 INJECTION, SOLUTION INTRAVENOUS; SUBCUTANEOUS at 17:18

## 2022-05-09 RX ADMIN — AMLODIPINE BESYLATE 5 MG: 5 TABLET ORAL at 08:18

## 2022-05-09 RX ADMIN — ATORVASTATIN CALCIUM 20 MG: 20 TABLET, FILM COATED ORAL at 20:36

## 2022-05-09 RX ADMIN — CLONAZEPAM 1 MG: 0.5 TABLET ORAL at 20:37

## 2022-05-09 RX ADMIN — CLONIDINE HYDROCHLORIDE 0.1 MG: 0.1 TABLET ORAL at 16:05

## 2022-05-09 RX ADMIN — TRAZODONE HYDROCHLORIDE 50 MG: 50 TABLET ORAL at 20:35

## 2022-05-09 RX ADMIN — ASENAPINE MALEATE 5 MG: 5 TABLET SUBLINGUAL at 08:18

## 2022-05-09 RX ADMIN — LEVOTHYROXINE SODIUM 50 MCG: 50 TABLET ORAL at 06:05

## 2022-05-09 RX ADMIN — ENALAPRIL MALEATE 20 MG: 10 TABLET ORAL at 08:17

## 2022-05-09 RX ADMIN — METOPROLOL SUCCINATE 100 MG: 50 TABLET, FILM COATED, EXTENDED RELEASE ORAL at 08:18

## 2022-05-09 RX ADMIN — CLONIDINE HYDROCHLORIDE 0.1 MG: 0.1 TABLET ORAL at 20:36

## 2022-05-10 LAB
GLUCOSE BLDC GLUCOMTR-MCNC: 107 MG/DL (ref 70–130)
GLUCOSE BLDC GLUCOMTR-MCNC: 140 MG/DL (ref 70–130)
GLUCOSE BLDC GLUCOMTR-MCNC: 145 MG/DL (ref 70–130)

## 2022-05-10 PROCEDURE — 82962 GLUCOSE BLOOD TEST: CPT

## 2022-05-10 PROCEDURE — 99232 SBSQ HOSP IP/OBS MODERATE 35: CPT | Performed by: NURSE PRACTITIONER

## 2022-05-10 RX ADMIN — LEVOTHYROXINE SODIUM 50 MCG: 50 TABLET ORAL at 06:05

## 2022-05-10 RX ADMIN — AMLODIPINE BESYLATE 5 MG: 5 TABLET ORAL at 08:15

## 2022-05-10 RX ADMIN — CLONIDINE HYDROCHLORIDE 0.1 MG: 0.1 TABLET ORAL at 08:14

## 2022-05-10 RX ADMIN — CLONIDINE HYDROCHLORIDE 0.1 MG: 0.1 TABLET ORAL at 15:07

## 2022-05-10 RX ADMIN — CLONAZEPAM 1 MG: 0.5 TABLET ORAL at 20:34

## 2022-05-10 RX ADMIN — CLONIDINE HYDROCHLORIDE 0.1 MG: 0.1 TABLET ORAL at 20:34

## 2022-05-10 RX ADMIN — ENALAPRIL MALEATE 20 MG: 10 TABLET ORAL at 08:15

## 2022-05-10 RX ADMIN — ASENAPINE MALEATE 10 MG: 10 TABLET SUBLINGUAL at 20:39

## 2022-05-10 RX ADMIN — ATORVASTATIN CALCIUM 20 MG: 20 TABLET, FILM COATED ORAL at 20:34

## 2022-05-10 RX ADMIN — CLONAZEPAM 1 MG: 0.5 TABLET ORAL at 08:15

## 2022-05-10 RX ADMIN — TRAZODONE HYDROCHLORIDE 50 MG: 50 TABLET ORAL at 20:34

## 2022-05-10 RX ADMIN — ASENAPINE MALEATE 10 MG: 10 TABLET SUBLINGUAL at 08:13

## 2022-05-11 LAB
GLUCOSE BLDC GLUCOMTR-MCNC: 126 MG/DL (ref 70–130)
GLUCOSE BLDC GLUCOMTR-MCNC: 136 MG/DL (ref 70–130)
GLUCOSE BLDC GLUCOMTR-MCNC: 137 MG/DL (ref 70–130)
GLUCOSE BLDC GLUCOMTR-MCNC: 141 MG/DL (ref 70–130)
POTASSIUM SERPL-SCNC: 4 MMOL/L (ref 3.5–5.2)

## 2022-05-11 PROCEDURE — 99232 SBSQ HOSP IP/OBS MODERATE 35: CPT | Performed by: NURSE PRACTITIONER

## 2022-05-11 PROCEDURE — 84132 ASSAY OF SERUM POTASSIUM: CPT | Performed by: NURSE PRACTITIONER

## 2022-05-11 PROCEDURE — 82962 GLUCOSE BLOOD TEST: CPT

## 2022-05-11 RX ADMIN — ATORVASTATIN CALCIUM 20 MG: 20 TABLET, FILM COATED ORAL at 20:34

## 2022-05-11 RX ADMIN — ENALAPRIL MALEATE 20 MG: 10 TABLET ORAL at 08:03

## 2022-05-11 RX ADMIN — ASENAPINE MALEATE 10 MG: 10 TABLET SUBLINGUAL at 08:04

## 2022-05-11 RX ADMIN — CLONIDINE HYDROCHLORIDE 0.1 MG: 0.1 TABLET ORAL at 08:04

## 2022-05-11 RX ADMIN — CLONIDINE HYDROCHLORIDE 0.1 MG: 0.1 TABLET ORAL at 20:34

## 2022-05-11 RX ADMIN — ASENAPINE MALEATE 10 MG: 10 TABLET SUBLINGUAL at 20:34

## 2022-05-11 RX ADMIN — TRAZODONE HYDROCHLORIDE 50 MG: 50 TABLET ORAL at 20:34

## 2022-05-11 RX ADMIN — AMLODIPINE BESYLATE 5 MG: 5 TABLET ORAL at 08:04

## 2022-05-11 RX ADMIN — LEVOTHYROXINE SODIUM 50 MCG: 50 TABLET ORAL at 06:03

## 2022-05-11 RX ADMIN — CLONIDINE HYDROCHLORIDE 0.1 MG: 0.1 TABLET ORAL at 15:44

## 2022-05-11 RX ADMIN — CLONAZEPAM 1 MG: 0.5 TABLET ORAL at 08:04

## 2022-05-11 RX ADMIN — CLONAZEPAM 1 MG: 0.5 TABLET ORAL at 20:34

## 2022-05-12 LAB
GLUCOSE BLDC GLUCOMTR-MCNC: 102 MG/DL (ref 70–130)
GLUCOSE BLDC GLUCOMTR-MCNC: 129 MG/DL (ref 70–130)
GLUCOSE BLDC GLUCOMTR-MCNC: 199 MG/DL (ref 70–130)

## 2022-05-12 PROCEDURE — 99232 SBSQ HOSP IP/OBS MODERATE 35: CPT | Performed by: PSYCHIATRY & NEUROLOGY

## 2022-05-12 PROCEDURE — 82962 GLUCOSE BLOOD TEST: CPT

## 2022-05-12 PROCEDURE — 63710000001 INSULIN ASPART PER 5 UNITS: Performed by: NURSE PRACTITIONER

## 2022-05-12 RX ADMIN — LEVOTHYROXINE SODIUM 50 MCG: 50 TABLET ORAL at 06:05

## 2022-05-12 RX ADMIN — AMLODIPINE BESYLATE 5 MG: 5 TABLET ORAL at 08:01

## 2022-05-12 RX ADMIN — ENALAPRIL MALEATE 20 MG: 10 TABLET ORAL at 08:01

## 2022-05-12 RX ADMIN — ATORVASTATIN CALCIUM 20 MG: 20 TABLET, FILM COATED ORAL at 20:24

## 2022-05-12 RX ADMIN — CLONAZEPAM 1 MG: 0.5 TABLET ORAL at 20:24

## 2022-05-12 RX ADMIN — ASENAPINE MALEATE 10 MG: 10 TABLET SUBLINGUAL at 20:24

## 2022-05-12 RX ADMIN — CLONIDINE HYDROCHLORIDE 0.1 MG: 0.1 TABLET ORAL at 15:56

## 2022-05-12 RX ADMIN — ASENAPINE MALEATE 10 MG: 10 TABLET SUBLINGUAL at 08:01

## 2022-05-12 RX ADMIN — TRAZODONE HYDROCHLORIDE 50 MG: 50 TABLET ORAL at 20:23

## 2022-05-12 RX ADMIN — CLONIDINE HYDROCHLORIDE 0.1 MG: 0.1 TABLET ORAL at 08:00

## 2022-05-12 RX ADMIN — CLONAZEPAM 1 MG: 0.5 TABLET ORAL at 08:01

## 2022-05-12 RX ADMIN — INSULIN ASPART 2 UNITS: 100 INJECTION, SOLUTION INTRAVENOUS; SUBCUTANEOUS at 11:36

## 2022-05-12 RX ADMIN — CLONIDINE HYDROCHLORIDE 0.1 MG: 0.1 TABLET ORAL at 20:24

## 2022-05-13 VITALS
OXYGEN SATURATION: 98 % | DIASTOLIC BLOOD PRESSURE: 98 MMHG | RESPIRATION RATE: 18 BRPM | SYSTOLIC BLOOD PRESSURE: 148 MMHG | HEART RATE: 68 BPM | HEIGHT: 72 IN | TEMPERATURE: 96.1 F | WEIGHT: 290 LBS | BODY MASS INDEX: 39.28 KG/M2

## 2022-05-13 PROBLEM — F29 PSYCHOSIS: Status: RESOLVED | Noted: 2022-05-08 | Resolved: 2022-05-13

## 2022-05-13 LAB — GLUCOSE BLDC GLUCOMTR-MCNC: 134 MG/DL (ref 70–130)

## 2022-05-13 PROCEDURE — 82962 GLUCOSE BLOOD TEST: CPT

## 2022-05-13 PROCEDURE — 90847 FAMILY PSYTX W/PT 50 MIN: CPT | Performed by: PSYCHIATRY & NEUROLOGY

## 2022-05-13 PROCEDURE — 99238 HOSP IP/OBS DSCHRG MGMT 30/<: CPT | Performed by: PSYCHIATRY & NEUROLOGY

## 2022-05-13 RX ORDER — CLONAZEPAM 1 MG/1
1 TABLET ORAL 2 TIMES DAILY
Qty: 60 TABLET | Refills: 0 | Status: SHIPPED | OUTPATIENT
Start: 2022-05-13 | End: 2022-05-27 | Stop reason: SDUPTHER

## 2022-05-13 RX ORDER — ASENAPINE 10 MG/1
10 TABLET SUBLINGUAL 2 TIMES DAILY
Qty: 60 TABLET | Refills: 1 | Status: SHIPPED | OUTPATIENT
Start: 2022-05-13 | End: 2022-10-05

## 2022-05-13 RX ADMIN — AMLODIPINE BESYLATE 5 MG: 5 TABLET ORAL at 08:30

## 2022-05-13 RX ADMIN — CLONIDINE HYDROCHLORIDE 0.1 MG: 0.1 TABLET ORAL at 08:30

## 2022-05-13 RX ADMIN — ENALAPRIL MALEATE 20 MG: 10 TABLET ORAL at 08:31

## 2022-05-13 RX ADMIN — CLONAZEPAM 1 MG: 0.5 TABLET ORAL at 08:30

## 2022-05-13 RX ADMIN — METOPROLOL SUCCINATE 100 MG: 50 TABLET, FILM COATED, EXTENDED RELEASE ORAL at 08:30

## 2022-05-13 RX ADMIN — LEVOTHYROXINE SODIUM 50 MCG: 50 TABLET ORAL at 06:22

## 2022-05-13 RX ADMIN — ASENAPINE MALEATE 10 MG: 10 TABLET SUBLINGUAL at 08:31

## 2022-05-27 ENCOUNTER — OFFICE VISIT (OUTPATIENT)
Dept: SLEEP MEDICINE | Facility: HOSPITAL | Age: 47
End: 2022-05-27

## 2022-05-27 VITALS
SYSTOLIC BLOOD PRESSURE: 136 MMHG | HEIGHT: 72 IN | DIASTOLIC BLOOD PRESSURE: 86 MMHG | OXYGEN SATURATION: 96 % | BODY MASS INDEX: 41.31 KG/M2 | WEIGHT: 305 LBS | HEART RATE: 82 BPM

## 2022-05-27 DIAGNOSIS — G47.00 FREQUENT NOCTURNAL AWAKENING: ICD-10-CM

## 2022-05-27 DIAGNOSIS — E66.01 MORBID OBESITY: ICD-10-CM

## 2022-05-27 DIAGNOSIS — F51.04 PSYCHOPHYSIOLOGICAL INSOMNIA: ICD-10-CM

## 2022-05-27 DIAGNOSIS — G47.19 EXCESSIVE DAYTIME SLEEPINESS: ICD-10-CM

## 2022-05-27 DIAGNOSIS — R06.83 SNORING: Primary | ICD-10-CM

## 2022-05-27 PROCEDURE — 99214 OFFICE O/P EST MOD 30 MIN: CPT | Performed by: NURSE PRACTITIONER

## 2022-05-27 NOTE — PROGRESS NOTES
New Patient Sleep Medicine Consultation    Encounter Date: 5/27/2022         Patient's Primary Care Provider: Aura Carrillo MD  Referring Provider: Anson Montgomery*  Reason for consultation/chief complaint: snoring, awakening gasping for breath, witnessed apneas, excessive daytime sleepiness, unrefreshing sleep and insomnia    Kuldip Castaneda is a 46 y.o. male who admits to snoring, unrestful sleep, high blood pressure, decreased libido, excessive daytime sleepiness, sleeping less than 6 hours per night, up to bathroom at night, difficulty falling asleep, difficulty staying asleep and taking medicine to help go to sleep.    He denies cataplexy, sleep paralysis, or hypnagogic hallucinations. His bedtime is ~ 1094-3787. He  falls asleep after 60 minutes, and is up 2-3 times per night. He wakes up ~ 9202-7439. He endorses 4-6 hours of sleep. He drinks 1-2 cups of coffee, 0 teas, and 0 sodas per day. He drinks 0 alcoholic beverages per week. He is a current smoker. He does not take sedatives or hypnotics. He has no sleepiness with driving. He naps every day for 1-2 hours.     He is currently taking clonazepam for sleep. He does have a history of PTSD, Bipolar disorder, and anxiety.     [place] - 5    Prior Sleep Testing: None    Past Medical History:   Diagnosis Date   • Anxiety    • Biliary dyskinesia    • Blood in feces         • Chest pain    • Chronic cholecystitis without calculus     postoperative      • Depressive disorder    • Epigastric pain    • Essential hypertension    • Gastro-esophageal reflux disease with esophagitis    • Generalized anxiety disorder    • Genital warts     large left groin      • Glaucoma suspect     suspicious disc cupping      • Hashimoto's thyroiditis    • Hematochezia    • History of echocardiogram 01/15/2016    Mild concentric LV hypertrophy with normal left atrial and aortic root size. LV systolic function is overall well preserved with EF 55-60%. Mitral valve mildly  thickened with adequate opening.   • Hypercholesterolemia    • Hyperlipemia    • Hypertensive disorder    • Lipoma of anterior chest wall     left   • Major depressive disorder    • Microscopic hematuria    • Morbid obesity (HCC)    • Multiple acquired skin tags     in groin   • Nausea and vomiting    • Obesity    • Pain in pelvis    • Painful urging to urinate    • Panic disorder    • Psychiatric illness    • Psychosis (HCC)    • Right upper quadrant pain    • Schizoaffective disorder (HCC)    • Transient visual loss     resolved, prob BS elevation      • Type 2 diabetes mellitus (HCC)     not on medications at this time    • Visual disturbance    • Vitamin D deficiency      Social History     Socioeconomic History   • Marital status:      Spouse name: Ngozi   • Number of children: 2   • Years of education: 12   Tobacco Use   • Smoking status: Former Smoker   • Smokeless tobacco: Never Used   Substance and Sexual Activity   • Alcohol use: No     Comment: Tried cocaine, crack, meth, thc all when he was young at parties.  Nothing in about 2 decades   • Drug use: No     Comment: Used 1 month ago   • Sexual activity: Defer     Family History   Problem Relation Age of Onset   • Depression Mother    • Schizophrenia Father         or Bipolar Disorder, admitted to [facility]   • Heart attack Father    • Heart attack Paternal Uncle      Prior T&A, UPPP, maxillofacial, or bariatric surgery: None  Family history of sleep disorders: None  Other family history + for: As above  Occupation: Stay at home parent  Marital status:   Children: 2  Has 2 brothers and 1 sisters  Smoking history: smoked never    Review of Systems:  Constitutional: positive for fatigue  Ears, nose, mouth, throat, and face: positive for snoring  Respiratory: negative  Cardiovascular: negative  Gastrointestinal: negative  Genitourinary:negative  Musculoskeletal:negative  Neurological: negative  Behavioral/Psych: positive for anxiety,  "depression and fatigue  Patient advised to discuss any positive ROS with PCP.      Vitals:    05/27/22 1050   BP: 136/86   Pulse: 82   SpO2: 96%           05/27/22  1050   Weight: (!) 138 kg (305 lb)       Body mass index is 41.36 kg/m². Class 3 Severe Obesity (BMI >=40). Obesity-related health conditions include the following: hypertension, diabetes mellitus, dyslipidemias and GERD. Obesity is unchanged. BMI is is above average; BMI management plan is completed. I recommend portion control and increasing exercise.    Tobacco Use: Medium Risk   • Smoking Tobacco Use: Former Smoker   • Smokeless Tobacco Use: Never Used       Physical Exam:        General: Alert. Cooperative. Well developed. No acute distress.   Head/Neck:  Normocephalic. Symmetrical. Atraumatic.     Neck circumference: 18.5\"             Eyes: Sclera clear. No icterus. PERRLA. Normal EOM.             Ears: No deformities. Normal hearing.             Nose: No septal deviation. No drainage.          Throat: No oral lesions. No thrush. Moist mucous membranes. Trachea midline.    Tongue is normal     Dentition is fair, high-arched palate       Pharynx: Posterior pharyngeal pillars are narrow    Mallampati score of III (soft and hard palate and base of uvula visible)    Pharynx is nonerythematous, with both tonsils moderately enlarged   Chest Wall:  Normal shape. Symmetric expansion with respiration. No tenderness.          Lungs:  Clear to auscultation bilaterally. No wheezes. No rhonchi. No rales. Respirations regular, even and unlabored.            Heart:  Regular rhythm and normal rate. Normal S1 and S2. No murmur.     Abdomen:  Soft, non-tender and non-distended. Normal bowel sounds. No masses.  Extremities:  Moves all extremities well. No edema.           Pulses: Pulses palpable and equal bilaterally.               Skin: Dry. Intact. No bleeding. No rash.           Neuro: Moves all 4 extremities and cranial nerves grossly intact.  Psychiatric: Normal " mood and affect.      Current Outpatient Medications:   •  amLODIPine (NORVASC) 5 MG tablet, Take 1 tablet by mouth Daily., Disp: 90 tablet, Rfl: 3  •  asenapine maleate (SAPHRIS) 10 MG sublingual tablet sublingual tablet, Dissolve 1 tablet under the tongue 2 (Two) Times a Day., Disp: 60 tablet, Rfl: 1  •  asenapine maleate (Saphris) 10 MG sublingual tablet sublingual tablet, Place one (1) tablet under the tongue twice a day, Disp: 60 tablet, Rfl: 1  •  atorvastatin (LIPITOR) 20 MG tablet, Take 1 tablet by mouth Every Night., Disp: 90 tablet, Rfl: 2  •  [START ON 6/11/2022] clonazePAM (KlonoPIN) 1 MG tablet, Take 1 tablet by mouth 2 (Two) Times a Day As Needed for anxiety., Disp: 60 tablet, Rfl: 0  •  cloNIDine (CATAPRES) 0.1 MG tablet, Take 1 tablet by mouth 3 (Three) Times a Day., Disp: 270 tablet, Rfl: 2  •  Dulaglutide (Trulicity) 4.5 MG/0.5ML solution pen-injector, Inject 4.5 mg (1 pen) under the skin into the appropriate area as directed 1 (One) Time Per Week., Disp: 2 mL, Rfl: 5  •  enalapril (VASOTEC) 20 MG tablet, Take 1 tablet by mouth Daily., Disp: 30 tablet, Rfl: 5  •  levothyroxine (Synthroid) 50 MCG tablet, Take 1 tablet by mouth Daily., Disp: 90 tablet, Rfl: 1  •  multivitamin with minerals tablet tablet, Take 1 tablet by mouth Daily. Indications: supplement, Disp:  , Rfl:   •  Omega-3 Fatty Acids (fish oil) 1000 MG capsule capsule, Take 1 capsule by mouth., Disp: , Rfl:   •  metoprolol succinate XL (Toprol XL) 100 MG 24 hr tablet, Take 1 tablet by mouth Daily., Disp: 30 tablet, Rfl: 5    WBC   Date Value Ref Range Status   05/07/2022 9.05 3.40 - 10.80 10*3/mm3 Final     RBC   Date Value Ref Range Status   05/07/2022 4.86 4.14 - 5.80 10*6/mm3 Final     Hemoglobin   Date Value Ref Range Status   05/07/2022 14.4 13.0 - 17.7 g/dL Final     Hematocrit   Date Value Ref Range Status   05/07/2022 40.6 37.5 - 51.0 % Final     MCV   Date Value Ref Range Status   05/07/2022 83.5 79.0 - 97.0 fL Final     MCH    Date Value Ref Range Status   05/07/2022 29.6 26.6 - 33.0 pg Final     MCHC   Date Value Ref Range Status   05/07/2022 35.5 31.5 - 35.7 g/dL Final     RDW   Date Value Ref Range Status   05/07/2022 14.0 12.3 - 15.4 % Final     RDW-SD   Date Value Ref Range Status   05/07/2022 43.1 37.0 - 54.0 fl Final     MPV   Date Value Ref Range Status   05/07/2022 9.5 6.0 - 12.0 fL Final     Platelets   Date Value Ref Range Status   05/07/2022 352 140 - 450 10*3/mm3 Final     Neutrophil %   Date Value Ref Range Status   05/07/2022 63.6 42.7 - 76.0 % Final     Lymphocyte %   Date Value Ref Range Status   05/07/2022 27.3 19.6 - 45.3 % Final     Monocyte %   Date Value Ref Range Status   05/07/2022 7.5 5.0 - 12.0 % Final     Eosinophil %   Date Value Ref Range Status   05/07/2022 0.6 0.3 - 6.2 % Final     Basophil %   Date Value Ref Range Status   05/07/2022 0.6 0.0 - 1.5 % Final     Immature Grans %   Date Value Ref Range Status   05/07/2022 0.4 0.0 - 0.5 % Final     Neutrophils, Absolute   Date Value Ref Range Status   05/07/2022 5.76 1.70 - 7.00 10*3/mm3 Final     Lymphocytes, Absolute   Date Value Ref Range Status   05/07/2022 2.47 0.70 - 3.10 10*3/mm3 Final     Monocytes, Absolute   Date Value Ref Range Status   05/07/2022 0.68 0.10 - 0.90 10*3/mm3 Final     Eosinophils, Absolute   Date Value Ref Range Status   05/07/2022 0.05 0.00 - 0.40 10*3/mm3 Final     Basophils, Absolute   Date Value Ref Range Status   05/07/2022 0.05 0.00 - 0.20 10*3/mm3 Final     Immature Grans, Absolute   Date Value Ref Range Status   05/07/2022 0.04 0.00 - 0.05 10*3/mm3 Final     nRBC   Date Value Ref Range Status   05/07/2022 0.0 0.0 - 0.2 /100 WBC Final     Lab Results   Component Value Date    GLUCOSE 301 (H) 05/07/2022    BUN 12 05/07/2022    CREATININE 1.43 (H) 05/07/2022    EGFRIFAFRI 105 09/01/2021    BCR 8.4 05/07/2022    K 4.0 05/11/2022    CO2 21.0 (L) 05/07/2022    CALCIUM 9.7 05/07/2022    ALBUMIN 4.60 05/07/2022    AST 23 05/07/2022     ALT 28 05/07/2022       Contraindications to home sleep test: none    ASSESSMENT:  1. Excessive daytime sleepiness, presumed obstructive sleep apnea - New (to me), additional work-up planned (4)  1. Check home sleep study   2. Follow up for results  2. Snoring, presumed obstructive sleep apnea - New (to me), additional work-up planned (4)  1. As above  3. Frequent nocturnal awakenings - New (to me), additional work-up planned (4)  1. As above  4. Insomnia - sleep onset/maintenance - New (to me), additional work-up planned (4)    1.   Address after further testing   2.   Continue clonazepam 1 mg for now  5. Morbid obesity - BMI 41.1 - stable chronic illness      I spent 30 minutes caring for Kuldip on this date of service. This time includes time spent by me in the following activities: preparing for the visit, reviewing tests, obtaining and/or reviewing a separately obtained history, performing a medically appropriate examination and/or evaluation , counseling and educating the patient/family/caregiver, ordering medications, tests, or procedures, documenting information in the medical record and care coordination; discussing PAP therapy and Further testing    RTC 2 weeks after testing. Patient agrees to return sooner if changes in symptoms.         This document has been electronically signed by SARIKA Gagnon on May 27, 2022 11:09 CDT          CC: Aura Carrillo MD [place], [place] [facility]*

## 2022-06-07 ENCOUNTER — HOSPITAL ENCOUNTER (OUTPATIENT)
Dept: SLEEP MEDICINE | Facility: HOSPITAL | Age: 47
Discharge: HOME | End: 2022-06-07
Admitting: NURSE PRACTITIONER

## 2022-06-07 DIAGNOSIS — R06.83 SNORING: ICD-10-CM

## 2022-06-07 DIAGNOSIS — G47.00 FREQUENT NOCTURNAL AWAKENING: ICD-10-CM

## 2022-06-07 DIAGNOSIS — G47.19 EXCESSIVE DAYTIME SLEEPINESS: ICD-10-CM

## 2022-06-07 PROCEDURE — 95800 SLP STDY UNATTENDED: CPT

## 2022-06-07 PROCEDURE — 95800 SLP STDY UNATTENDED: CPT | Performed by: PSYCHIATRY & NEUROLOGY

## 2022-06-09 DIAGNOSIS — I10 ESSENTIAL HYPERTENSION: ICD-10-CM

## 2022-06-09 RX ORDER — ENALAPRIL MALEATE 20 MG/1
20 TABLET ORAL DAILY
Qty: 30 TABLET | Refills: 5 | Status: SHIPPED | OUTPATIENT
Start: 2022-06-09 | End: 2022-12-08 | Stop reason: SDUPTHER

## 2022-06-21 ENCOUNTER — OFFICE VISIT (OUTPATIENT)
Dept: SLEEP MEDICINE | Facility: HOSPITAL | Age: 47
End: 2022-06-21

## 2022-06-21 VITALS
HEART RATE: 88 BPM | WEIGHT: 305 LBS | DIASTOLIC BLOOD PRESSURE: 97 MMHG | BODY MASS INDEX: 41.31 KG/M2 | OXYGEN SATURATION: 98 % | SYSTOLIC BLOOD PRESSURE: 153 MMHG | HEIGHT: 72 IN

## 2022-06-21 DIAGNOSIS — E66.01 MORBID OBESITY: ICD-10-CM

## 2022-06-21 DIAGNOSIS — G47.33 OBSTRUCTIVE SLEEP APNEA, ADULT: Primary | ICD-10-CM

## 2022-06-21 PROCEDURE — 99213 OFFICE O/P EST LOW 20 MIN: CPT | Performed by: NURSE PRACTITIONER

## 2022-06-21 NOTE — PROGRESS NOTES
Sleep Clinic Follow Up - Sleep Study Results    Date: 6/21/2022  Primary Care Provider: Aura Carrillo MD  Chief complaint/Reason for visit: follow up sleep testing    Last office visit: 05/27/2022    HPI:  The patient is a 46 y.o. male.  I discussed the results of the recent home sleep study performed on 06/07/2022.  The AHI/KIRK was 5.6, RDI was 15. Pulse range of 54-94 bpm. O2 wm of 90% with no sustained hypoxia.      INTERVAL MEDICAL HISTORY: No change since last office visit in regard to the patient's bedtime routine, medications, or diagnosis.      Review of Systems:  Denies chest pain, shortness of breath, leg swelling, cough, fever, chills, abdominal pain, N/V/D.    MEDICATIONS:   Current Outpatient Medications:   •  amLODIPine (NORVASC) 5 MG tablet, Take 1 tablet by mouth Daily., Disp: 90 tablet, Rfl: 3  •  asenapine maleate (SAPHRIS) 10 MG sublingual tablet sublingual tablet, Dissolve 1 tablet under the tongue 2 (Two) Times a Day., Disp: 60 tablet, Rfl: 1  •  asenapine maleate (Saphris) 10 MG sublingual tablet sublingual tablet, Place one (1) tablet under the tongue twice a day, Disp: 60 tablet, Rfl: 1  •  asenapine maleate (Saphris) 10 MG sublingual tablet sublingual tablet, Place one (1) tablet under the tongue at bedtime, Disp: 60 tablet, Rfl: 1  •  atorvastatin (LIPITOR) 20 MG tablet, Take 1 tablet by mouth Every Night., Disp: 90 tablet, Rfl: 2  •  clonazePAM (KlonoPIN) 1 MG tablet, Take 1 tablet by mouth 2 (Two) Times a Day As Needed for anxiety., Disp: 60 tablet, Rfl: 0  •  cloNIDine (CATAPRES) 0.1 MG tablet, Take 1 tablet by mouth 3 (Three) Times a Day., Disp: 270 tablet, Rfl: 2  •  Dulaglutide (Trulicity) 4.5 MG/0.5ML solution pen-injector, Inject 4.5 mg (1 pen) under the skin into the appropriate area as directed 1 (One) Time Per Week., Disp: 2 mL, Rfl: 5  •  enalapril (VASOTEC) 20 MG tablet, Take 1 tablet by mouth Daily., Disp: 30 tablet, Rfl: 5  •  levothyroxine (Synthroid) 50 MCG tablet,  Take 1 tablet by mouth Daily., Disp: 90 tablet, Rfl: 1  •  metoprolol succinate XL (Toprol XL) 100 MG 24 hr tablet, Take 1 tablet by mouth Daily., Disp: 30 tablet, Rfl: 5  •  multivitamin with minerals tablet tablet, Take 1 tablet by mouth Daily. Indications: supplement, Disp:  , Rfl:   •  Omega-3 Fatty Acids (fish oil) 1000 MG capsule capsule, Take 1 capsule by mouth., Disp: , Rfl:     PHYSICAL EXAM:    Vitals:    06/21/22 1123   BP: 153/97   Pulse: 88   SpO2: 98%         06/21/22  1123   Weight: (!) 138 kg (305 lb)     Body mass index is 41.36 kg/m².  Class 3 Severe Obesity (BMI >=40). Obesity-related health conditions include the following: obstructive sleep apnea, hypertension, diabetes mellitus, dyslipidemias and GERD. Obesity is unchanged. BMI is is above average; BMI management plan is completed. I recommend portion control and increasing exercise.    Gen:                No distress, conversant, pleasant, appears stated age, alert, oriented  Eyes:               Anicteric sclera, moist conjunctiva, no lid lag                           PERRL, EOMI   Heent:             NC/AT                          Oropharynx clear                          Normal hearing  Lungs:             Normal effort, non-labored breathing   CV:                  Normal S1/S2                          No lower extremity edema  ABD:               Rounded, non-distended                   Psych:             Appropriate affect  Neuro:             CN 2-12 appear intact      Assessment and Plan:    1. Obstructive sleep apnea - New (to me), no additional work-up planned (3)  1. The sleep study results were discussed in detail with the patient. The risks of untreated sleep apnea were reviewed. Treatment options for sleep apnea were discussed. I counseled patient on sleep hygiene, including regular sleep wake schedule and stimulus control therapy, the importance of weight reduction, and abstaining from smoking and alcohol consumption  2. Proceed with  autoCPAP 5-20 cm H2O with mask per patient choice ([facility]) - close monitoring for CSA given benzodiazepine use - low threshold for in lab PAP titration study if any issues  3. Follow up in 30-90 days with compliance report, or sooner if trouble with PAP adaptation  4. NAVA/CPAP information provided in AVS  2. Morbid obesity - BMI 41.4 - stable chronic illness      All of the patient's questions were answered. He states understanding and agreement with my assessment and plan as above.     I spent 20 minutes caring for Kuldip on this date of service. This time includes time spent by me in the following activities: preparing for the visit, reviewing tests, obtaining and/or reviewing a separately obtained history, performing a medically appropriate examination and/or evaluation , counseling and educating the patient/family/caregiver, documenting information in the medical record and care coordination; discussing PAP therapy, PAP compliance, PAP maintenance and Study results    Patient to follow up in 31-90 days with compliance report. Patient agrees to return sooner if changes in symptoms.       This document has been electronically signed by SARIKA Gagnon on June 21, 2022 11:29 CDT            CC: Aura Carrillo MD          No ref. provider found

## 2022-07-05 ENCOUNTER — OFFICE VISIT (OUTPATIENT)
Dept: FAMILY MEDICINE CLINIC | Facility: CLINIC | Age: 47
End: 2022-07-05

## 2022-07-05 VITALS
WEIGHT: 305 LBS | HEIGHT: 72 IN | SYSTOLIC BLOOD PRESSURE: 142 MMHG | OXYGEN SATURATION: 98 % | HEART RATE: 84 BPM | BODY MASS INDEX: 41.31 KG/M2 | TEMPERATURE: 97.4 F | DIASTOLIC BLOOD PRESSURE: 100 MMHG

## 2022-07-05 DIAGNOSIS — I10 ESSENTIAL HYPERTENSION: Primary | ICD-10-CM

## 2022-07-05 DIAGNOSIS — F31.77 BIPOLAR 1 DISORDER, MIXED, PARTIAL REMISSION: ICD-10-CM

## 2022-07-05 DIAGNOSIS — G47.33 OBSTRUCTIVE SLEEP APNEA SYNDROME: ICD-10-CM

## 2022-07-05 DIAGNOSIS — E66.9 OBESITY, CLASS II, BMI 35-39.9: ICD-10-CM

## 2022-07-05 PROCEDURE — 99214 OFFICE O/P EST MOD 30 MIN: CPT | Performed by: FAMILY MEDICINE

## 2022-07-05 RX ORDER — AMLODIPINE BESYLATE 10 MG/1
10 TABLET ORAL DAILY
Qty: 90 TABLET | Refills: 3 | Status: SHIPPED | OUTPATIENT
Start: 2022-07-05

## 2022-07-05 NOTE — PROGRESS NOTES
"Subjective   Kuldip Bossman Castaneda is a 46 y.o. male.  With type 2 diabetes, bipolar disorder, hypertension here today for follow-up after recent hospitalization for bipolar psychotic episode.  He had some medication adjustments made.  His blood pressure has been running really high at home.  He has been taking his medications he says as prescribed.  Overall he is feeling a bit \"frustrated\" as he said he has a lot of goals and he cannot achieve them.  He says he has a lot of ideas and things that he wants to invent and cannot convince people that his ideas are reasonable.    Also he has sleep apnea and was told that his oxygen is dropping in his sleep and he really needs to wear the CPAP device.  He is not real happy about this idea but we did spend some time discussing it and he is willing to do so.    Notably his alprazolam was changed to clonazepam while in the hospital.  He seems to think this is working just as well for him.  He is also now on Saphris rather than Seroquel.    Continues to express frustration with his weight.  Despite his best efforts he has not been able to achieve or maintain weight loss on his own.    Hypertension   This is a chronic problem. The current episode started more than 1 year ago. The problem has been waxing and waning since onset. Associated symptoms include anxiety, headaches and malaise/fatigue. Pertinent negatives include no blurred vision, chest pain, neck pain, orthopnea, palpitations, peripheral edema, PND, shortness of breath or sweats. There are no associated agents to hypertension. Risk factors for coronary artery disease include dyslipidemia and family history. Past treatments include beta-blockers, ACE inhibitors, alpha agonist.   The current treatment provides moderate improvement. There are no compliance problems.      Compliance with medications is %.     The following portions of the patient's history were reviewed and updated as appropriate: allergies, current " "medications, past family history, past medical history, past social history, past surgical history and problem list.    Review of Systems   HENT: Negative for sneezing and trouble swallowing.    Eyes: Negative for visual disturbance.   Respiratory: Negative for chest tightness and wheezing.    Cardiovascular: Negative for leg swelling.   Gastrointestinal: Positive for abdominal pain.   Genitourinary: Negative for urgency.   Musculoskeletal: Negative for back pain.   Psychiatric/Behavioral: Positive for hallucinations.            All other systems reviewed and are negative.      Objective    Vitals:    07/05/22 0903   BP: 142/100   Pulse: 84   Temp: 97.4 °F (36.3 °C)   TempSrc: Tympanic   SpO2: 98%   Weight: (!) 138 kg (305 lb)   Height: 182.9 cm (72\")   PainSc: 0-No pain     Body mass index is 41.37 kg/m².    Physical Exam   Constitutional: He appears well-developed. He is cooperative. He does not appear ill.   HENT:   Head: Normocephalic and atraumatic.   Nose: Nose normal.   Eyes: Pupils are equal, round, and reactive to light. Conjunctivae are normal. Right eye exhibits no discharge. Left eye exhibits no discharge. No scleral icterus.   Neck: Trachea normal and phonation normal. No thyromegaly present.       Cardiovascular: Normal rate, regular rhythm and normal heart sounds. Exam reveals no gallop and no friction rub.   No murmur heard.  Pulmonary/Chest: Effort normal and breath sounds normal. No respiratory distress. He has no wheezes. He has no rales.   Abdominal: Soft. Normal appearance and bowel sounds are normal. He exhibits no distension. There is no abdominal tenderness. There is no rebound and no guarding.   Musculoskeletal: Normal range of motion.      Lumbar back: Pain: chronic, rates pain a 5.     Vascular Status -  His right foot exhibits abnormal foot vasculature . His right foot exhibits no edema. His left foot exhibits abnormal foot vasculature . His left foot exhibits no edema.  Lymphadenopathy: "     He has no cervical adenopathy.   Neurological: No cranial nerve deficit.   Skin: Skin is warm and dry.   Nursing note and vitals reviewed.  Current outpatient and discharge medications have been reconciled for the patient.  Reviewed by: Aura Carrillo MD    Assessment & Plan   Diagnoses and all orders for this visit:    1. Essential hypertension (Primary)  -     amLODIPine (NORVASC) 10 MG tablet; Take 1 tablet by mouth Daily.  Dispense: 90 tablet; Refill: 3    2. Bipolar 1 disorder, mixed, partial remission (HCC)    3. Obesity, Class II, BMI 35-39.9    4. Obstructive sleep apnea syndrome    Noted medication changes from psychiatry and he will follow-up accordingly.  Highly encouraged him to have outpatient counseling.    Will increase amlodipine to 10 mg and he will monitor blood pressures regularly with a goal of 130/80 or less, follow-up with me in 1 month or sooner for problems.    Encouraged him to wear the CPAP, as above.        This document has been electronically signed by Aura Carrillo MD on July 5, 2022 09:31 CDT

## 2022-07-20 RX ORDER — ATORVASTATIN CALCIUM 20 MG/1
20 TABLET, FILM COATED ORAL NIGHTLY
Qty: 90 TABLET | Refills: 2 | Status: SHIPPED | OUTPATIENT
Start: 2022-07-20

## 2022-08-08 ENCOUNTER — TELEPHONE (OUTPATIENT)
Dept: FAMILY MEDICINE CLINIC | Facility: CLINIC | Age: 47
End: 2022-08-08

## 2022-08-08 RX ORDER — DEXTROMETHORPHAN HYDROBROMIDE AND PROMETHAZINE HYDROCHLORIDE 15; 6.25 MG/5ML; MG/5ML
5 SYRUP ORAL 4 TIMES DAILY PRN
Qty: 240 ML | Refills: 2 | Status: SHIPPED | OUTPATIENT
Start: 2022-08-08 | End: 2022-10-05

## 2022-08-08 NOTE — TELEPHONE ENCOUNTER
Aura Carrillo MD  You 5 minutes ago (4:36 PM)        Please let him know I cannot send the Tussionex because he is on clonazepam and they would interact but I did send a different cough syrup

## 2022-08-09 ENCOUNTER — TELEPHONE (OUTPATIENT)
Dept: FAMILY MEDICINE CLINIC | Facility: CLINIC | Age: 47
End: 2022-08-09

## 2022-08-09 NOTE — TELEPHONE ENCOUNTER
----- Message from Aura Carrillo MD sent at 8/8/2022  4:58 PM CDT -----  He would meet criteria for the Paxlovid if he would like me to call him in some for Covid

## 2022-09-02 DIAGNOSIS — I10 ESSENTIAL HYPERTENSION: Primary | ICD-10-CM

## 2022-09-02 RX ORDER — CLONIDINE HYDROCHLORIDE 0.1 MG/1
TABLET ORAL
Qty: 90 TABLET | Refills: 0 | Status: SHIPPED | OUTPATIENT
Start: 2022-09-02 | End: 2022-10-26 | Stop reason: SDUPTHER

## 2022-09-21 ENCOUNTER — OFFICE VISIT (OUTPATIENT)
Dept: SLEEP MEDICINE | Facility: HOSPITAL | Age: 47
End: 2022-09-21

## 2022-09-21 VITALS
BODY MASS INDEX: 37.93 KG/M2 | HEART RATE: 63 BPM | HEIGHT: 72 IN | DIASTOLIC BLOOD PRESSURE: 86 MMHG | SYSTOLIC BLOOD PRESSURE: 120 MMHG | OXYGEN SATURATION: 95 % | WEIGHT: 280 LBS

## 2022-09-21 DIAGNOSIS — E66.01 MORBID OBESITY: ICD-10-CM

## 2022-09-21 DIAGNOSIS — F51.04 PSYCHOPHYSIOLOGICAL INSOMNIA: ICD-10-CM

## 2022-09-21 DIAGNOSIS — G47.33 OBSTRUCTIVE SLEEP APNEA: Primary | ICD-10-CM

## 2022-09-21 PROCEDURE — 99213 OFFICE O/P EST LOW 20 MIN: CPT | Performed by: NURSE PRACTITIONER

## 2022-09-21 NOTE — PROGRESS NOTES
Sleep Clinic Follow Up    Date: 2022  Primary Care Provider: Aura Carrillo MD    Last office visit: 2022 (I reviewed this note)    CC: Follow up: NAVA started on CPAP      Interim History:  Since the last visit:    1) mild NAVA -  Kuldip Bossman Castaneda has had sub-optimal compliance with CPAP. He denies mask and machine issues, dry mouth, headaches, or pressures intolerance. He denies abnormal dreams, sleep paralysis, nasal congestion, URI sx. Since starting PAP therapy, patient reports very slightly more refreshing sleep and slightly less daytime sleepiness. He notices minimal benefit of PAP therapy.    2) Patient denies RLS symptoms.     3) Insomnia - Patient has tried multiple medications in the past (unsure of which), but states that the only thing that has worked for him is Xanax. He is currently taking clonazepam 1 mg QHS and states that this is not working well. He does report falling asleep most nights on the couch, and once he gets up to go to bed, he is not sleepy any longer. When he does this, he states that he does not feel like messing with his CPAP mask at that time frequently.     Sleep Testin. HST on 2022, AHI of 5.6   2. Currently on 5-20 cm H2O    PAP Data:    Time frame: 2022-2022   Compliance: 69.6%  Average use on days used: 5 hrs 2 min  Percent of days with usage greater than or equal to 4 hours: 46.4%  PAP range: 5-20 cm H2O  Average 90% pressure: 8 cmH2O  Leak: 10 minutes  Average AHI: 1.5 events/hr  Mask type: Nasal pillows  DME: [employer]  Machine type: 3B [employer] II    Bed time: 7663-8749  Sleep latency: 60 minutes  Number of times awakens during the night: 2-3  Wake time: [id number]  Estimated total sleep time at night: 4-6 hours  Caffeine intake: 1-2 cups of coffee, 0 cups of tea, and 0 sodas per day  Alcohol intake: 0 drinks per week  Nap time: most days, evening, before bed   Sleepiness with Driving: none     [place]ose[place] - 7 (previously 5)  [place]  Sleepiness Scale  Sitting and reading: Would never doze  Watching TV: High chance of dozing  Sitting, inactive in a public place (e.g. a theatre or a meeting): Would never doze  As a passenger in a car for an hour without a break: Would never doze  Lying down to rest in the afternoon when circumstances permit: Moderate chance of dozing  Sitting and talking to someone: Would never doze  Sitting quietly after a lunch without alcohol: Moderate chance of dozing  In a car, while stopped for a few minutes in traffic: Would never doze  Total score: 7    PMHx, FH, SH reviewed and pertinent changes are: Reportedly unchanged from last office visit with us.      Review of Systems:   Negative for chest pain, SOA, fever, chills, cough, N/V/D, abdominal pain.    Smoking:none    Kuldip Castaneda  reports that he has quit smoking. He has never used smokeless tobacco.    Physical Exam:  Vitals:    09/21/22 1128   BP: 120/86   Pulse: 63   SpO2: 95%           09/21/22  1128   Weight: 127 kg (280 lb)     Body mass index is 37.97 kg/m². Class 2 Severe Obesity (BMI >=35 and <=39.9). Obesity-related health conditions include the following: obstructive sleep apnea, hypertension, diabetes mellitus, dyslipidemias and GERD. Obesity is stable. BMI is is above average; BMI management plan is completed. I recommend portion control and increasing exercise.    Gen:                No distress, conversant, pleasant, appears stated age, alert, oriented  Eyes:               Anicteric sclera, moist conjunctiva, no lid lag                           PERRL, EOMI   Heent:             NC/AT                          Oropharynx clear                          Normal hearing  Lungs:             Normal effort, non-labored breathing      CV:                  Normal S1/S2                          No lower extremity edema  ABD:               Soft, rounded, non-distended                    Psych:             Appropriate affect  Neuro:             CN 2-12 appear  intact    Past Medical History:   Diagnosis Date   • Anxiety    • Biliary dyskinesia    • Blood in feces         • Chest pain    • Chronic cholecystitis without calculus     postoperative      • Depressive disorder    • Epigastric pain    • Essential hypertension    • Gastro-esophageal reflux disease with esophagitis    • Generalized anxiety disorder    • Genital warts     large left groin      • Glaucoma suspect     suspicious disc cupping      • Hashimoto's thyroiditis    • Hematochezia    • History of echocardiogram 01/15/2016    Mild concentric LV hypertrophy with normal left atrial and aortic root size. LV systolic function is overall well preserved with EF 55-60%. Mitral valve mildly thickened with adequate opening.   • Hypercholesterolemia    • Hyperlipemia    • Hypertensive disorder    • Lipoma of anterior chest wall     left   • Major depressive disorder    • Microscopic hematuria    • Morbid obesity (HCC)    • Multiple acquired skin tags     in groin   • Nausea and vomiting    • Obesity    • Pain in pelvis    • Painful urging to urinate    • Panic disorder    • Psychiatric illness    • Psychosis (HCC)    • Right upper quadrant pain    • Schizoaffective disorder (HCC)    • Transient visual loss     resolved, prob BS elevation      • Type 2 diabetes mellitus (HCC)     not on medications at this time    • Visual disturbance    • Vitamin D deficiency        Current Outpatient Medications:   •  amLODIPine (NORVASC) 10 MG tablet, Take 1 tablet by mouth Daily., Disp: 90 tablet, Rfl: 3  •  asenapine maleate (SAPHRIS) 10 MG sublingual tablet sublingual tablet, Dissolve 1 tablet under the tongue 2 (Two) Times a Day., Disp: 60 tablet, Rfl: 1  •  asenapine maleate (Saphris) 10 MG sublingual tablet sublingual tablet, Place one (1) tablet under the tongue twice a day, Disp: 60 tablet, Rfl: 1  •  asenapine maleate (Saphris) 10 MG sublingual tablet sublingual tablet, Place one (1) tablet under the tongue at bedtime, Disp:  60 tablet, Rfl: 1  •  asenapine maleate (Saphris) 10 MG sublingual tablet sublingual tablet, Place one (1) tablet under the tongue at bedtime, Disp: 60 tablet, Rfl: 3  •  atorvastatin (LIPITOR) 20 MG tablet, Take 1 tablet by mouth Every Night., Disp: 90 tablet, Rfl: 2  •  clonazePAM (KlonoPIN) 1 MG tablet, Take 1 tablet by mouth 2 (Two) Times a Day As Needed for anxiety., Disp: 60 tablet, Rfl: 0  •  clonazePAM (KlonoPIN) 1 MG tablet, Take one (1) tablet by mouth twice a day as needed for anxiety, Disp: 60 tablet, Rfl: 0  •  cloNIDine (CATAPRES) 0.1 MG tablet, Take 1 tablet by mouth three times daily., Disp: 90 tablet, Rfl: 0  •  Dulaglutide (Trulicity) 4.5 MG/0.5ML solution pen-injector, Inject 4.5 mg (1 pen) under the skin into the appropriate area as directed 1 (One) Time Per Week., Disp: 2 mL, Rfl: 5  •  enalapril (VASOTEC) 20 MG tablet, Take 1 tablet by mouth Daily., Disp: 30 tablet, Rfl: 5  •  levothyroxine (Synthroid) 50 MCG tablet, Take 1 tablet by mouth Daily., Disp: 90 tablet, Rfl: 1  •  metoprolol succinate XL (Toprol XL) 100 MG 24 hr tablet, Take 1 tablet by mouth Daily., Disp: 30 tablet, Rfl: 5  •  multivitamin with minerals tablet tablet, Take 1 tablet by mouth Daily. Indications: supplement, Disp:  , Rfl:   •  Omega-3 Fatty Acids (fish oil) 1000 MG capsule capsule, Take 1 capsule by mouth., Disp: , Rfl:   •  promethazine-dextromethorphan (PROMETHAZINE-DM) 6.25-15 MG/5ML syrup, Take 5 mL by mouth 4 (Four) Times a Day As Needed for Cough., Disp: 240 mL, Rfl: 2    WBC   Date Value Ref Range Status   05/07/2022 9.05 3.40 - 10.80 10*3/mm3 Final     RBC   Date Value Ref Range Status   05/07/2022 4.86 4.14 - 5.80 10*6/mm3 Final     Hemoglobin   Date Value Ref Range Status   05/07/2022 14.4 13.0 - 17.7 g/dL Final     Hematocrit   Date Value Ref Range Status   05/07/2022 40.6 37.5 - 51.0 % Final     MCV   Date Value Ref Range Status   05/07/2022 83.5 79.0 - 97.0 fL Final     MCH   Date Value Ref Range Status    05/07/2022 29.6 26.6 - 33.0 pg Final     MCHC   Date Value Ref Range Status   05/07/2022 35.5 31.5 - 35.7 g/dL Final     RDW   Date Value Ref Range Status   05/07/2022 14.0 12.3 - 15.4 % Final     RDW-SD   Date Value Ref Range Status   05/07/2022 43.1 37.0 - 54.0 fl Final     MPV   Date Value Ref Range Status   05/07/2022 9.5 6.0 - 12.0 fL Final     Platelets   Date Value Ref Range Status   05/07/2022 352 140 - 450 10*3/mm3 Final     Neutrophil %   Date Value Ref Range Status   05/07/2022 63.6 42.7 - 76.0 % Final     Lymphocyte %   Date Value Ref Range Status   05/07/2022 27.3 19.6 - 45.3 % Final     Monocyte %   Date Value Ref Range Status   05/07/2022 7.5 5.0 - 12.0 % Final     Eosinophil %   Date Value Ref Range Status   05/07/2022 0.6 0.3 - 6.2 % Final     Basophil %   Date Value Ref Range Status   05/07/2022 0.6 0.0 - 1.5 % Final     Immature Grans %   Date Value Ref Range Status   05/07/2022 0.4 0.0 - 0.5 % Final     Neutrophils, Absolute   Date Value Ref Range Status   05/07/2022 5.76 1.70 - 7.00 10*3/mm3 Final     Lymphocytes, Absolute   Date Value Ref Range Status   05/07/2022 2.47 0.70 - 3.10 10*3/mm3 Final     Monocytes, Absolute   Date Value Ref Range Status   05/07/2022 0.68 0.10 - 0.90 10*3/mm3 Final     Eosinophils, Absolute   Date Value Ref Range Status   05/07/2022 0.05 0.00 - 0.40 10*3/mm3 Final     Basophils, Absolute   Date Value Ref Range Status   05/07/2022 0.05 0.00 - 0.20 10*3/mm3 Final     Immature Grans, Absolute   Date Value Ref Range Status   05/07/2022 0.04 0.00 - 0.05 10*3/mm3 Final     nRBC   Date Value Ref Range Status   05/07/2022 0.0 0.0 - 0.2 /100 WBC Final       Lab Results   Component Value Date    GLUCOSE 301 (H) 05/07/2022    BUN 12 05/07/2022    CREATININE 1.43 (H) 05/07/2022    EGFRIFAFRI 105 09/01/2021    BCR 8.4 05/07/2022    K 4.0 05/11/2022    CO2 21.0 (L) 05/07/2022    CALCIUM 9.7 05/07/2022    ALBUMIN 4.60 05/07/2022    AST 23 05/07/2022    ALT 28 05/07/2022        Assessment and Plan:    1. Obstructive sleep apnea - Established, stable (1)  1. Sub-optimally compliant with PAP therapy - encouraged increased compliance   2. Continue PAP as prescribed  3. Script for PAP supplies  4. Return to clinic in 1 month with compliance report unless change in symptoms in interim period  2. Insomnia - sleep onset and or maintenance - Established, not controlled   1.   Continue clonazepam 1 mg QHS for now per Psych or PCP   2.   Follow up as needed  3. Morbid obesity - BMI 38 - stable chronic illness      I spent 30 minutes caring for Kuldip on this date of service. This time includes time spent by me in the following activities: preparing for the visit, reviewing tests, obtaining and/or reviewing a separately obtained history, performing a medically appropriate examination and/or evaluation , counseling and educating the patient/family/caregiver, documenting information in the medical record and care coordination; discussing PAP therapy, PAP compliance and PAP maintenance    RTC in 1 month. Patient agrees to return sooner if changes in symptoms.        This document has been electronically signed by SARIKA Gagnon on September 21, 2022 11:33 CDT          CC: Aura Carrillo MD          No ref. provider found

## 2022-10-05 ENCOUNTER — OFFICE VISIT (OUTPATIENT)
Dept: FAMILY MEDICINE CLINIC | Facility: CLINIC | Age: 47
End: 2022-10-05

## 2022-10-05 ENCOUNTER — LAB (OUTPATIENT)
Dept: LAB | Facility: OTHER | Age: 47
End: 2022-10-05

## 2022-10-05 VITALS
WEIGHT: 281.8 LBS | TEMPERATURE: 98.2 F | HEIGHT: 72 IN | BODY MASS INDEX: 38.17 KG/M2 | OXYGEN SATURATION: 97 % | DIASTOLIC BLOOD PRESSURE: 100 MMHG | SYSTOLIC BLOOD PRESSURE: 130 MMHG | HEART RATE: 75 BPM

## 2022-10-05 DIAGNOSIS — I10 ESSENTIAL HYPERTENSION: ICD-10-CM

## 2022-10-05 DIAGNOSIS — E29.1 DEFICIENCY OF TESTOSTERONE BIOSYNTHESIS: ICD-10-CM

## 2022-10-05 DIAGNOSIS — E66.9 OBESITY, CLASS II, BMI 35-39.9: ICD-10-CM

## 2022-10-05 DIAGNOSIS — E11.43 TYPE 2 DIABETES MELLITUS WITH DIABETIC AUTONOMIC NEUROPATHY, WITHOUT LONG-TERM CURRENT USE OF INSULIN: Primary | ICD-10-CM

## 2022-10-05 DIAGNOSIS — E55.9 HYPOVITAMINOSIS D: ICD-10-CM

## 2022-10-05 DIAGNOSIS — G47.00 INSOMNIA, UNSPECIFIED TYPE: ICD-10-CM

## 2022-10-05 DIAGNOSIS — E53.8 B12 DEFICIENCY: ICD-10-CM

## 2022-10-05 DIAGNOSIS — E11.43 TYPE 2 DIABETES MELLITUS WITH DIABETIC AUTONOMIC NEUROPATHY, WITHOUT LONG-TERM CURRENT USE OF INSULIN: ICD-10-CM

## 2022-10-05 LAB
ALBUMIN SERPL-MCNC: 4.4 G/DL (ref 3.5–5)
ALBUMIN/GLOB SERPL: 1.5 G/DL (ref 1.1–1.8)
ALP SERPL-CCNC: 75 U/L (ref 38–126)
ALT SERPL W P-5'-P-CCNC: 33 U/L
ANION GAP SERPL CALCULATED.3IONS-SCNC: 8 MMOL/L (ref 5–15)
AST SERPL-CCNC: 24 U/L (ref 17–59)
BASOPHILS # BLD AUTO: 0.04 10*3/MM3 (ref 0–0.2)
BASOPHILS NFR BLD AUTO: 1.1 % (ref 0–1.5)
BILIRUB SERPL-MCNC: 0.6 MG/DL (ref 0.2–1.3)
BUN SERPL-MCNC: 9 MG/DL (ref 7–23)
BUN/CREAT SERPL: 12 (ref 7–25)
CALCIUM SPEC-SCNC: 9.2 MG/DL (ref 8.4–10.2)
CHLORIDE SERPL-SCNC: 102 MMOL/L (ref 101–112)
CHOLEST SERPL-MCNC: 155 MG/DL (ref 150–200)
CO2 SERPL-SCNC: 25 MMOL/L (ref 22–30)
CREAT SERPL-MCNC: 0.75 MG/DL (ref 0.7–1.3)
DEPRECATED RDW RBC AUTO: 41.9 FL (ref 37–54)
EGFRCR SERPLBLD CKD-EPI 2021: 112 ML/MIN/1.73
EOSINOPHIL # BLD AUTO: 0.13 10*3/MM3 (ref 0–0.4)
EOSINOPHIL NFR BLD AUTO: 3.6 % (ref 0.3–6.2)
ERYTHROCYTE [DISTWIDTH] IN BLOOD BY AUTOMATED COUNT: 13.8 % (ref 12.3–15.4)
GLOBULIN UR ELPH-MCNC: 3 GM/DL (ref 2.3–3.5)
GLUCOSE SERPL-MCNC: 322 MG/DL (ref 70–99)
HCT VFR BLD AUTO: 39.8 % (ref 37.5–51)
HDLC SERPL-MCNC: 31 MG/DL (ref 40–59)
HGB BLD-MCNC: 13.7 G/DL (ref 13–17.7)
LDLC SERPL CALC-MCNC: 85 MG/DL
LDLC/HDLC SERPL: 2.52 {RATIO} (ref 0–3.55)
LYMPHOCYTES # BLD AUTO: 1.58 10*3/MM3 (ref 0.7–3.1)
LYMPHOCYTES NFR BLD AUTO: 44 % (ref 19.6–45.3)
MCH RBC QN AUTO: 29 PG (ref 26.6–33)
MCHC RBC AUTO-ENTMCNC: 34.4 G/DL (ref 31.5–35.7)
MCV RBC AUTO: 84.1 FL (ref 79–97)
MONOCYTES # BLD AUTO: 0.26 10*3/MM3 (ref 0.1–0.9)
MONOCYTES NFR BLD AUTO: 7.2 % (ref 5–12)
NEUTROPHILS NFR BLD AUTO: 1.58 10*3/MM3 (ref 1.7–7)
NEUTROPHILS NFR BLD AUTO: 44.1 % (ref 42.7–76)
PLATELET # BLD AUTO: 226 10*3/MM3 (ref 140–450)
PMV BLD AUTO: 9.6 FL (ref 6–12)
POTASSIUM SERPL-SCNC: 3.9 MMOL/L (ref 3.4–5)
PROT SERPL-MCNC: 7.4 G/DL (ref 6.3–8.6)
RBC # BLD AUTO: 4.73 10*6/MM3 (ref 4.14–5.8)
SODIUM SERPL-SCNC: 135 MMOL/L (ref 137–145)
TRIGL SERPL-MCNC: 229 MG/DL
VLDLC SERPL-MCNC: 39 MG/DL (ref 5–40)
WBC NRBC COR # BLD: 3.59 10*3/MM3 (ref 3.4–10.8)

## 2022-10-05 PROCEDURE — 82306 VITAMIN D 25 HYDROXY: CPT | Performed by: FAMILY MEDICINE

## 2022-10-05 PROCEDURE — 85025 COMPLETE CBC W/AUTO DIFF WBC: CPT | Performed by: FAMILY MEDICINE

## 2022-10-05 PROCEDURE — 82746 ASSAY OF FOLIC ACID SERUM: CPT | Performed by: FAMILY MEDICINE

## 2022-10-05 PROCEDURE — 80061 LIPID PANEL: CPT | Performed by: FAMILY MEDICINE

## 2022-10-05 PROCEDURE — 84403 ASSAY OF TOTAL TESTOSTERONE: CPT | Performed by: FAMILY MEDICINE

## 2022-10-05 PROCEDURE — 84402 ASSAY OF FREE TESTOSTERONE: CPT | Performed by: FAMILY MEDICINE

## 2022-10-05 PROCEDURE — 84443 ASSAY THYROID STIM HORMONE: CPT | Performed by: FAMILY MEDICINE

## 2022-10-05 PROCEDURE — 82607 VITAMIN B-12: CPT | Performed by: FAMILY MEDICINE

## 2022-10-05 PROCEDURE — 80053 COMPREHEN METABOLIC PANEL: CPT | Performed by: FAMILY MEDICINE

## 2022-10-05 PROCEDURE — 36415 COLL VENOUS BLD VENIPUNCTURE: CPT | Performed by: FAMILY MEDICINE

## 2022-10-05 PROCEDURE — 99214 OFFICE O/P EST MOD 30 MIN: CPT | Performed by: FAMILY MEDICINE

## 2022-10-05 PROCEDURE — 83036 HEMOGLOBIN GLYCOSYLATED A1C: CPT | Performed by: FAMILY MEDICINE

## 2022-10-05 PROCEDURE — 84439 ASSAY OF FREE THYROXINE: CPT | Performed by: FAMILY MEDICINE

## 2022-10-05 RX ORDER — ALPRAZOLAM 1 MG/1
1 TABLET ORAL NIGHTLY PRN
Qty: 30 TABLET | Refills: 2 | Status: SHIPPED | OUTPATIENT
Start: 2022-10-05 | End: 2022-12-07 | Stop reason: SDUPTHER

## 2022-10-05 NOTE — PROGRESS NOTES
Subjective   Kuldip Castaneda is a 47 y.o. male.  With type 2 diabetes, bipolar disorder, vitamin B and D deficiency, low testosterone, hypertension here today for follow-up.  He has been on Trulicity and his blood sugars are doing better.  He is lost some weight and feels much better as well.  He is due for labs for follow-up on lipids and diabetes which she is going to get done today.    Following inpatient psychiatric hospitalization he was referred to mental health and had some medication changes.  He had been on Xanax for quite some time but he was switched over to clonazepam.  He said he really only takes it at night and it is not keeping him asleep the way that the Xanax did.  He has tried Seroquel and temazepam among other medications which were not effective for him.  The sleep seems to be his biggest issue right now and he says this is causing him to have daytime drowsiness and a lot of stress.    Blood pressure is elevated here today but he said is not usually like this at home    Also the patient had a sleep study that was inconclusive and was scheduled for an inpatient sleep study but he does not wish to go through with this.  He said he tried a CPAP and did not find it helpful at all and in fact he thinks it made it worse.    Hypertension   This is a chronic problem. The current episode started more than 1 year ago. The problem has been waxing and waning since onset. Associated symptoms include anxiety, headaches and malaise/fatigue. Pertinent negatives include no blurred vision, chest pain, neck pain, orthopnea, palpitations, peripheral edema, PND, shortness of breath or sweats. There are no associated agents to hypertension. Risk factors for coronary artery disease include dyslipidemia and family history. Past treatments include beta-blockers, ACE inhibitors, alpha agonist.   The current treatment provides moderate improvement. There are no compliance problems.      Compliance with medications is  "%.     The following portions of the patient's history were reviewed and updated as appropriate: allergies, current medications, past family history, past medical history, past social history, past surgical history and problem list.    Review of Systems   HENT: Negative for sneezing and trouble swallowing.    Eyes: Negative for visual disturbance.   Respiratory: Negative for chest tightness and wheezing.    Cardiovascular: Negative for leg swelling.   Gastrointestinal: Positive for abdominal pain.   Genitourinary: Negative for urgency.   Musculoskeletal: Negative for back pain.   Psychiatric/Behavioral: Positive for hallucinations.            All other systems reviewed and are negative.      Objective    Vitals:    10/05/22 0915   BP: 130/100   BP Location: Left arm   Patient Position: Sitting   Cuff Size: Large Adult   Pulse: 75   Temp: 98.2 °F (36.8 °C)   SpO2: 97%   Weight: 128 kg (281 lb 12.8 oz)   Height: 182.9 cm (72.01\")   PainSc: 0-No pain     Body mass index is 38.21 kg/m².    Physical Exam   Constitutional: He appears well-developed. He is cooperative. He does not appear ill.   HENT:   Head: Normocephalic and atraumatic.   Nose: Nose normal.   Eyes: Pupils are equal, round, and reactive to light. Conjunctivae are normal. Right eye exhibits no discharge. Left eye exhibits no discharge. No scleral icterus.   Neck: Trachea normal and phonation normal. No thyromegaly present.       Cardiovascular: Normal rate, regular rhythm and normal heart sounds. Exam reveals no gallop and no friction rub.   No murmur heard.  Pulmonary/Chest: Effort normal and breath sounds normal. No respiratory distress. He has no wheezes. He has no rales.   Abdominal: Soft. Normal appearance and bowel sounds are normal. He exhibits no distension. There is no abdominal tenderness. There is no rebound and no guarding.   Musculoskeletal: Normal range of motion.      Lumbar back: Pain: chronic, rates pain a 5.     Vascular Status -  " His right foot exhibits abnormal foot vasculature . His right foot exhibits no edema. His left foot exhibits abnormal foot vasculature . His left foot exhibits no edema.  Lymphadenopathy:     He has no cervical adenopathy.   Neurological: No cranial nerve deficit.   Skin: Skin is warm and dry.   Nursing note and vitals reviewed.    Assessment & Plan   Diagnoses and all orders for this visit:    1. Type 2 diabetes mellitus with diabetic autonomic neuropathy, without long-term current use of insulin (HCC) (Primary)  -     Cancel: Hemoglobin A1c  -     Cancel: Comprehensive Metabolic Panel  -     CBC & Differential; Future  -     Lipid Panel; Future  -     T4, Free  -     TSH    2. Insomnia, unspecified type  -     ALPRAZolam (XANAX) 1 MG tablet; Take 1 tablet by mouth At Night As Needed for Anxiety or Sleep.  Dispense: 30 tablet; Refill: 2    3. Obesity, Class II, BMI 35-39.9    4. Essential hypertension    5. B12 deficiency  -     Vitamin B12 & Folate    6. Hypovitaminosis D  -     Vitamin D 25 Hydroxy    7. Deficiency of testosterone biosynthesis  -     Testosterone, Free, Total    After long discussion with the patient, we will switch him back to Xanax at bedtime.  Rather than taking Xanax or Klonopin twice a day have recommended that he not use them in the daytime at all and just take the Xanax once nightly and he is agreeable to this.  We will discontinue the clonazepam after he finishes the current course.    Continue to monitor blood pressures closely and if diastolic not improved contact me but he says it is usually staying at goal at home.    I do recommend he follow-up with mental health as scheduled and discuss medication changes.    Will get labs today fasting for lipids, diabetes, and vitamin deficiencies.    Continue current medications for hypertension and continue to monitor blood pressures regularly with goal of 130/80 or less\    Continue on current dose of Trulicity and testing blood sugars daily  and when needed.    Patient declines further evaluation for sleep apnea.    Patient's (Body mass index is 38.21 kg/m².) indicates that they are obese (BMI >30) with health related conditions that include hypertension, diabetes mellitus and dyslipidemias . Weight is improving with lifestyle modifications. BMI is is above average; BMI management plan is completed. We discussed portion control and increasing exercise.   We will recheck testosterone levels with next labs as well      This document has been electronically signed by Aura Carrillo MD on October 5, 2022 09:35 CDT

## 2022-10-06 DIAGNOSIS — E11.43 TYPE 2 DIABETES MELLITUS WITH DIABETIC AUTONOMIC NEUROPATHY, WITHOUT LONG-TERM CURRENT USE OF INSULIN: ICD-10-CM

## 2022-10-06 DIAGNOSIS — E03.9 ACQUIRED HYPOTHYROIDISM: Primary | ICD-10-CM

## 2022-10-06 LAB
25(OH)D3 SERPL-MCNC: 31.8 NG/ML (ref 30–100)
FOLATE SERPL-MCNC: >20 NG/ML (ref 4.78–24.2)
HBA1C MFR BLD: 14.3 % (ref 4.8–5.6)
T4 FREE SERPL-MCNC: 1.43 NG/DL (ref 0.93–1.7)
TSH SERPL DL<=0.05 MIU/L-ACNC: 3.26 UIU/ML (ref 0.27–4.2)
VIT B12 BLD-MCNC: 606 PG/ML (ref 211–946)

## 2022-10-06 RX ORDER — LEVOTHYROXINE SODIUM 0.05 MG/1
50 TABLET ORAL DAILY
Qty: 90 TABLET | Refills: 1 | Status: SHIPPED | OUTPATIENT
Start: 2022-10-06 | End: 2023-04-06 | Stop reason: SDUPTHER

## 2022-10-06 RX ORDER — DULAGLUTIDE 4.5 MG/.5ML
4.5 INJECTION, SOLUTION SUBCUTANEOUS WEEKLY
Qty: 2 ML | Refills: 5 | Status: SHIPPED | OUTPATIENT
Start: 2022-10-06 | End: 2022-10-26

## 2022-10-09 LAB
TESTOST FREE SERPL-MCNC: 9.4 PG/ML (ref 6.8–21.5)
TESTOST SERPL-MCNC: 234 NG/DL (ref 264–916)

## 2022-10-10 NOTE — PROGRESS NOTES
Please call the patient regarding his abnormal result.  Testosterone came back low.  This could be affecting his energy levels and mood.  Does he want to try to go back on testosterone?

## 2022-10-11 ENCOUNTER — TELEPHONE (OUTPATIENT)
Dept: FAMILY MEDICINE CLINIC | Facility: CLINIC | Age: 47
End: 2022-10-11

## 2022-10-11 DIAGNOSIS — E29.1 DEFICIENCY OF TESTOSTERONE BIOSYNTHESIS: Primary | ICD-10-CM

## 2022-10-11 RX ORDER — TESTOSTERONE CYPIONATE 200 MG/ML
200 INJECTION, SOLUTION INTRAMUSCULAR
Qty: 2 ML | Refills: 3 | Status: SHIPPED | OUTPATIENT
Start: 2022-10-11

## 2022-10-11 NOTE — TELEPHONE ENCOUNTER
----- Message from Aura Carrillo MD sent at 10/10/2022  4:43 PM CDT -----  Please call the patient regarding his abnormal result.  Testosterone came back low.  This could be affecting his energy levels and mood.  Does he want to try to go back on testosterone?

## 2022-10-11 NOTE — TELEPHONE ENCOUNTER
I talked to pt about his low testosterone levels and the option of going back on the shots. He agreed and would like it sent to [facility] outpatient pharmacy.

## 2022-10-12 ENCOUNTER — PRIOR AUTHORIZATION (OUTPATIENT)
Dept: FAMILY MEDICINE CLINIC | Facility: CLINIC | Age: 47
End: 2022-10-12

## 2022-10-25 DIAGNOSIS — I10 ESSENTIAL HYPERTENSION: ICD-10-CM

## 2022-10-25 RX ORDER — CLONIDINE HYDROCHLORIDE 0.1 MG/1
TABLET ORAL
Qty: 90 TABLET | Refills: 0 | Status: CANCELLED | OUTPATIENT
Start: 2022-10-25

## 2022-10-26 ENCOUNTER — OFFICE VISIT (OUTPATIENT)
Dept: FAMILY MEDICINE CLINIC | Facility: CLINIC | Age: 47
End: 2022-10-26

## 2022-10-26 VITALS
DIASTOLIC BLOOD PRESSURE: 80 MMHG | RESPIRATION RATE: 16 BRPM | TEMPERATURE: 97.1 F | HEART RATE: 80 BPM | HEIGHT: 72 IN | BODY MASS INDEX: 38.06 KG/M2 | SYSTOLIC BLOOD PRESSURE: 132 MMHG | OXYGEN SATURATION: 99 % | WEIGHT: 281 LBS

## 2022-10-26 DIAGNOSIS — Z23 NEED FOR INFLUENZA VACCINATION: ICD-10-CM

## 2022-10-26 DIAGNOSIS — E66.9 OBESITY, CLASS II, BMI 35-39.9: ICD-10-CM

## 2022-10-26 DIAGNOSIS — E55.9 HYPOVITAMINOSIS D: ICD-10-CM

## 2022-10-26 DIAGNOSIS — E53.8 B12 DEFICIENCY: ICD-10-CM

## 2022-10-26 DIAGNOSIS — F31.77 BIPOLAR 1 DISORDER, MIXED, PARTIAL REMISSION: ICD-10-CM

## 2022-10-26 DIAGNOSIS — I10 ESSENTIAL HYPERTENSION: ICD-10-CM

## 2022-10-26 DIAGNOSIS — E11.43 TYPE 2 DIABETES MELLITUS WITH DIABETIC AUTONOMIC NEUROPATHY, WITHOUT LONG-TERM CURRENT USE OF INSULIN: Primary | ICD-10-CM

## 2022-10-26 DIAGNOSIS — E29.1 DEFICIENCY OF TESTOSTERONE BIOSYNTHESIS: ICD-10-CM

## 2022-10-26 PROCEDURE — 90471 IMMUNIZATION ADMIN: CPT | Performed by: FAMILY MEDICINE

## 2022-10-26 PROCEDURE — 90686 IIV4 VACC NO PRSV 0.5 ML IM: CPT | Performed by: FAMILY MEDICINE

## 2022-10-26 PROCEDURE — 99214 OFFICE O/P EST MOD 30 MIN: CPT | Performed by: FAMILY MEDICINE

## 2022-10-26 RX ORDER — CLONIDINE HYDROCHLORIDE 0.1 MG/1
TABLET ORAL
Qty: 90 TABLET | Refills: 0 | Status: CANCELLED | OUTPATIENT
Start: 2022-10-26

## 2022-10-26 RX ORDER — CLONIDINE HYDROCHLORIDE 0.1 MG/1
TABLET ORAL
Qty: 90 TABLET | Refills: 3 | Status: SHIPPED | OUTPATIENT
Start: 2022-10-26

## 2022-10-26 RX ORDER — TIRZEPATIDE 7.5 MG/.5ML
7.5 INJECTION, SOLUTION SUBCUTANEOUS WEEKLY
Qty: 2 ML | Refills: 0 | Status: SHIPPED | OUTPATIENT
Start: 2022-10-26 | End: 2022-11-27 | Stop reason: SDUPTHER

## 2022-10-26 RX ORDER — TIRZEPATIDE 10 MG/.5ML
10 INJECTION, SOLUTION SUBCUTANEOUS WEEKLY
Qty: 2 ML | Refills: 5 | Status: SHIPPED | OUTPATIENT
Start: 2022-10-26 | End: 2023-01-09 | Stop reason: SDUPTHER

## 2022-10-26 NOTE — PROGRESS NOTES
Subjective   Kuldipbret Castaneda is a 47 y.o. male.  With type 2 diabetes, bipolar disorder, vitamin B and D deficiency, low testosterone, hypertension here for follow-up today on recent labs.  A1c has been markedly elevated at 14 recently.  He feels that secondary to him drinking a lot of sweet tea and sugared beverages and he has already cut back on this.  He is still trying to lose weight through diet and exercise efforts and is very determined to do so.    Needs a refill of his blood pressure medicine, clonidine.    Would like his flu shot today.    He is very happy with his current regimen for bipolar disorder and in fact he seems very clear today and alert.  It seems to be working well for him except he is still having difficulty sleeping.  He takes his Saphris, Seroquel, and alprazolam at night in order to be able to sleep.  He was on clonazepam but had some alprazolam leftover.  We discussed switching him to alprazolam at his last visit but since he had just filled the clonazepam had wanted to wait until he was due for refill.    He is not taking the testosterone but says he feels okay without it    Hypertension   This is a chronic problem. The current episode started more than 1 year ago. The problem has been waxing and waning since onset. Associated symptoms include anxiety, headaches and malaise/fatigue. Pertinent negatives include no blurred vision, chest pain, neck pain, orthopnea, palpitations, peripheral edema, PND, shortness of breath or sweats. There are no associated agents to hypertension. Risk factors for coronary artery disease include dyslipidemia and family history. Past treatments include beta-blockers, ACE inhibitors, alpha agonist.   The current treatment provides moderate improvement. There are no compliance problems.      Compliance with medications is %.     The following portions of the patient's history were reviewed and updated as appropriate: allergies, current medications, past  "family history, past medical history, past social history, past surgical history and problem list.    Review of Systems   HENT: Negative for sneezing and trouble swallowing.    Eyes: Negative for visual disturbance.   Respiratory: Negative for chest tightness and wheezing.    Cardiovascular: Negative for leg swelling.   Gastrointestinal: Positive for abdominal pain.   Genitourinary: Negative for urgency.   Musculoskeletal: Negative for back pain.   Psychiatric/Behavioral: Positive for hallucinations.            All other systems reviewed and are negative.      Objective    Vitals:    10/26/22 0802   BP: 132/80   BP Location: Left arm   Patient Position: Sitting   Cuff Size: Adult   Pulse: 80   Resp: 16   Temp: 97.1 °F (36.2 °C)   SpO2: 99%   Weight: 127 kg (281 lb)   Height: 182.9 cm (72.01\")     Body mass index is 38.1 kg/m².    Physical Exam   Constitutional: He appears well-developed. He is cooperative. He does not appear ill.   HENT:   Head: Normocephalic and atraumatic.   Nose: Nose normal.   Eyes: Pupils are equal, round, and reactive to light. Conjunctivae are normal. Right eye exhibits no discharge. Left eye exhibits no discharge. No scleral icterus.   Neck: Trachea normal and phonation normal. No thyromegaly present.       Cardiovascular: Normal rate, regular rhythm and normal heart sounds. Exam reveals no gallop and no friction rub.   No murmur heard.  Pulmonary/Chest: Effort normal and breath sounds normal. No respiratory distress. He has no wheezes. He has no rales.   Abdominal: Soft. Normal appearance and bowel sounds are normal. He exhibits no distension. There is no abdominal tenderness. There is no rebound and no guarding.   Musculoskeletal: Normal range of motion.      Lumbar back: Pain: chronic, rates pain a 5.     Vascular Status -  His right foot exhibits abnormal foot vasculature . His right foot exhibits no edema. His left foot exhibits abnormal foot vasculature . His left foot exhibits no " edema.  Lymphadenopathy:     He has no cervical adenopathy.   Neurological: No cranial nerve deficit.   Skin: Skin is warm and dry.   Nursing note and vitals reviewed.    Lab on 10/05/2022   Component Date Value Ref Range Status   • Total Cholesterol 10/05/2022 155  150 - 200 mg/dL Final   • Triglycerides 10/05/2022 229 (H)  <=150 mg/dL Final   • HDL Cholesterol 10/05/2022 31 (L)  40 - 59 mg/dL Final   • LDL Cholesterol  10/05/2022 85  <=100 mg/dL Final   • VLDL Cholesterol 10/05/2022 39  5 - 40 mg/dL Final   • LDL/HDL Ratio 10/05/2022 2.52  0.00 - 3.55 Final   • WBC 10/05/2022 3.59  3.40 - 10.80 10*3/mm3 Final   • RBC 10/05/2022 4.73  4.14 - 5.80 10*6/mm3 Final   • Hemoglobin 10/05/2022 13.7  13.0 - 17.7 g/dL Final   • Hematocrit 10/05/2022 39.8  37.5 - 51.0 % Final   • MCV 10/05/2022 84.1  79.0 - 97.0 fL Final   • MCH 10/05/2022 29.0  26.6 - 33.0 pg Final   • MCHC 10/05/2022 34.4  31.5 - 35.7 g/dL Final   • RDW 10/05/2022 13.8  12.3 - 15.4 % Final   • RDW-SD 10/05/2022 41.9  37.0 - 54.0 fl Final   • MPV 10/05/2022 9.6  6.0 - 12.0 fL Final   • Platelets 10/05/2022 226  140 - 450 10*3/mm3 Final   • Neutrophil % 10/05/2022 44.1  42.7 - 76.0 % Final   • Lymphocyte % 10/05/2022 44.0  19.6 - 45.3 % Final   • Monocyte % 10/05/2022 7.2  5.0 - 12.0 % Final   • Eosinophil % 10/05/2022 3.6  0.3 - 6.2 % Final   • Basophil % 10/05/2022 1.1  0.0 - 1.5 % Final   • Neutrophils, Absolute 10/05/2022 1.58 (L)  1.70 - 7.00 10*3/mm3 Final   • Lymphocytes, Absolute 10/05/2022 1.58  0.70 - 3.10 10*3/mm3 Final   • Monocytes, Absolute 10/05/2022 0.26  0.10 - 0.90 10*3/mm3 Final   • Eosinophils, Absolute 10/05/2022 0.13  0.00 - 0.40 10*3/mm3 Final   • Basophils, Absolute 10/05/2022 0.04  0.00 - 0.20 10*3/mm3 Final   Office Visit on 10/05/2022   Component Date Value Ref Range Status   • Folate 10/05/2022 >20.00  4.78 - 24.20 ng/mL Final   • Vitamin B-12 10/05/2022 606  211 - 946 pg/mL Final   • 25 Hydroxy, Vitamin D 10/05/2022 31.8  30.0 -  100.0 ng/ml Final   • Free T4 10/05/2022 1.43  0.93 - 1.70 ng/dL Final   • TSH 10/05/2022 3.260  0.270 - 4.200 uIU/mL Final   • Testosterone, Total 10/05/2022 234 (L)  264 - 916 ng/dL Final    Adult male reference interval is based on a population of  healthy nonobese males (BMI <30) between 19 and 39 years old.  Roel et.al. JCEM 2017,102;8744-7232. PMID: [id number].   • Testosterone, Free 10/05/2022 9.4  6.8 - 21.5 pg/mL Final   ]    Assessment & Plan   Diagnoses and all orders for this visit:    1. Type 2 diabetes mellitus with diabetic autonomic neuropathy, without long-term current use of insulin (HCC) (Primary)  -     Tirzepatide (Mounjaro) 7.5 MG/0.5ML solution pen-injector; Inject 7.5 mg (1 pen) under the skin into the appropriate area as directed 1 (One) Time Per Week.  Dispense: 2 mL; Refill: 0  -     Tirzepatide (Mounjaro) 10 MG/0.5ML solution pen-injector; Inject 10 mg (1 pen) under the skin into the appropriate area as directed 1 (One) Time Per Week.  Dispense: 2 mL; Refill: 5  -     empagliflozin (JARDIANCE) 25 MG tablet tablet; Take 1 tablet by mouth Every Morning.  Dispense: 90 tablet; Refill: 3    2. Essential hypertension  -     cloNIDine (CATAPRES) 0.1 MG tablet; Take 1 tablet by mouth three times daily.  Dispense: 90 tablet; Refill: 3    3. Need for influenza vaccination  -     FluLaval/Fluzone >6 mos ([id number])    4. Deficiency of testosterone biosynthesis    5. Bipolar 1 disorder, mixed, partial remission (HCC)    6. Obesity, Class II, BMI 35-39.9    7. B12 deficiency    8. Hypovitaminosis D    Continue with current regimen of medications for bipolar disorder, as above, and also continue with current medicines for insomnia.  We had already made a switch from clonazepam to alprazolam for the insomnia, when the Klonopin prescription was due which should be now.    Continue current medications for hypertension and continue to monitor blood pressures regularly with goal of 130/80 or less    Will  [facility] in hopes of getting better glycemic control as well as weight loss and follow-up in 1 month or sooner for problems    Flu shot given today    Currently not on the testosterone he does not feel he needs it.  Contact me if he changes his mind.    Continue vitamin D and B12 supplements.    Patient's (Body mass index is 38.1 kg/m².) indicates that they are obese (BMI >30) with health related conditions that include hypertension, diabetes mellitus and dyslipidemias . Weight is improving with lifestyle modifications. BMI is is above average; BMI management plan is completed. We discussed portion control and increasing exercise.   We will recheck testosterone levels with next labs as well      This document has been electronically signed by Aura Carrillo MD on October 26, 2022 08:35 CDT

## 2022-11-21 ENCOUNTER — OFFICE VISIT (OUTPATIENT)
Dept: SLEEP MEDICINE | Facility: HOSPITAL | Age: 47
End: 2022-11-21

## 2022-11-21 VITALS
DIASTOLIC BLOOD PRESSURE: 85 MMHG | WEIGHT: 279 LBS | OXYGEN SATURATION: 95 % | HEIGHT: 72 IN | BODY MASS INDEX: 37.79 KG/M2 | SYSTOLIC BLOOD PRESSURE: 130 MMHG | HEART RATE: 71 BPM

## 2022-11-21 DIAGNOSIS — G47.33 OBSTRUCTIVE SLEEP APNEA: Primary | ICD-10-CM

## 2022-11-21 DIAGNOSIS — E66.01 MORBID OBESITY: ICD-10-CM

## 2022-11-21 DIAGNOSIS — F51.04 PSYCHOPHYSIOLOGICAL INSOMNIA: ICD-10-CM

## 2022-11-21 PROCEDURE — 99213 OFFICE O/P EST LOW 20 MIN: CPT | Performed by: NURSE PRACTITIONER

## 2022-11-21 NOTE — PROGRESS NOTES
Sleep Clinic Follow Up    Date: 2022  Primary Care Provider: Aura Carrillo MD    Last office visit: 2022 (I reviewed this note)    CC: Follow up: NAVA on CPAP      Interim History:  Since the last visit:    1) mild NAVA -  Kuldip Bossman Castaneda has not remained compliant with CPAP. He denies mask and machine issues, dry mouth, headaches, or pressures intolerance. He denies abnormal dreams, sleep paralysis, nasal congestion, URI sx. He states that he has not been able to sleep well but was recently switched back to his previous medication that was working well for him, so he is going to try to start using his CPAP more.   Patient did want to repeat his study in lab if possible due to thinking he had poor sleep on the night of his home study. However, this would not affect his results by causing less episodes of apnea, therefore, there would be no reason to repeat testing at this time unless patient wished to be restudied after significant weight loss or other major medical changes.    2) Insomnia - Patient was recently switched from clonazepam 1 mg QHS back to Xanax 1 mg QHS per his PCP. He was previously tolerating this medication well.     Sleep Testin. HST on 2022, AHI of 5.6   2. Currently on 5-20 cm H2O    PAP Data:  No new data available  Mask type: Nasal pillows  DME: [employer]  Machine type: 3B [employer] II    Bed time: [id number]  Sleep latency: 2-3 hours  Number of times awakens during the night: 2-3  Wake time: 0600  Estimated total sleep time at night: 3-4 hours  Caffeine intake: 1-2 cups of coffee, 0 cups of tea, and 0-1 sodas per day  Alcohol intake: 0 drinks per week  Nap time: rare   Sleepiness with Driving: none     [place] - 10  [place] Sleepiness Scale  Sitting and reading: Would never doze  Watching TV: High chance of dozing  Sitting, inactive in a public place (e.g. a theatre or a meeting): Slight chance of dozing  As a passenger in a car for an hour without a break:  Moderate chance of dozing  Lying down to rest in the afternoon when circumstances permit: Moderate chance of dozing  Sitting and talking to someone: Would never doze  Sitting quietly after a lunch without alcohol: Moderate chance of dozing  In a car, while stopped for a few minutes in traffic: Would never doze  Total score: 10    PMHx, FH, SH reviewed and pertinent changes are: Stopped clonazepam. Started Xanax 1 mg.       Review of Systems:   Negative for chest pain, SOA, fever, chills, cough, N/V/D, abdominal pain.    Smoking:none    Kuldip Castaneda  reports that he has quit smoking. He has never used smokeless tobacco.    Physical Exam:  Vitals:    11/21/22 1401   BP: 130/85   Pulse: 71   SpO2: 95%           11/21/22  1401   Weight: 127 kg (279 lb)     Body mass index is 37.83 kg/m². Class 2 Severe Obesity (BMI >=35 and <=39.9). Obesity-related health conditions include the following: obstructive sleep apnea, hypertension, diabetes mellitus, dyslipidemias and GERD. Obesity is unchanged. BMI is is above average; BMI management plan is completed. I recommend portion control and increasing exercise.    Gen:                No distress, conversant, pleasant, appears stated age, alert, oriented  Eyes:               Anicteric sclera, moist conjunctiva, no lid lag                           PERRL, EOMI   Heent:             NC/AT                          Oropharynx clear                          Normal hearing  Lungs:             Normal effort, non-labored breathing                          Clear to auscultation bilaterally          CV:                  Normal S1/S2, no murmur                          No lower extremity edema  ABD:               Soft, rounded, non-distended                          Normal bowel sounds                    Psych:             Appropriate affect  Neuro:             CN 2-12 appear intact    Past Medical History:   Diagnosis Date   • Anxiety    • Biliary dyskinesia    • Blood in feces         •  Chest pain    • Chronic cholecystitis without calculus     postoperative      • Depressive disorder    • Epigastric pain    • Essential hypertension    • Gastro-esophageal reflux disease with esophagitis    • Generalized anxiety disorder    • Genital warts     large left groin      • Glaucoma suspect     suspicious disc cupping      • Hashimoto's thyroiditis    • Hematochezia    • History of echocardiogram 01/15/2016    Mild concentric LV hypertrophy with normal left atrial and aortic root size. LV systolic function is overall well preserved with EF 55-60%. Mitral valve mildly thickened with adequate opening.   • Hypercholesterolemia    • Hyperlipemia    • Hypertensive disorder    • Lipoma of anterior chest wall     left   • Major depressive disorder    • Microscopic hematuria    • Morbid obesity (HCC)    • Multiple acquired skin tags     in groin   • Nausea and vomiting    • Obesity    • Pain in pelvis    • Painful urging to urinate    • Panic disorder    • Psychiatric illness    • Psychosis (HCC)    • Right upper quadrant pain    • Schizoaffective disorder (HCC)    • Transient visual loss     resolved, prob BS elevation      • Type 2 diabetes mellitus ([facility])     not on medications at this time    • Visual disturbance    • Vitamin D deficiency        Current Outpatient Medications:   •  ALPRAZolam (XANAX) 1 MG tablet, Take 1 tablet by mouth At Night As Needed for Anxiety or Sleep., Disp: 30 tablet, Rfl: 2  •  amLODIPine (NORVASC) 10 MG tablet, Take 1 tablet by mouth Daily., Disp: 90 tablet, Rfl: 3  •  asenapine maleate (Saphris) 10 MG sublingual tablet sublingual tablet, Place one (1) tablet under the tongue at bedtime, Disp: 60 tablet, Rfl: 3  •  atorvastatin (LIPITOR) 20 MG tablet, Take 1 tablet by mouth Every Night., Disp: 90 tablet, Rfl: 2  •  clonazePAM (KlonoPIN) 1 MG tablet, Take 1 tablet by mouth 2 (Two) Times a Day As Needed for anxiety., Disp: 60 tablet, Rfl: 0  •  cloNIDine (CATAPRES) 0.1 MG tablet, Take  "1 tablet by mouth three times daily., Disp: 90 tablet, Rfl: 3  •  empagliflozin (JARDIANCE) 25 MG tablet tablet, Take 1 tablet by mouth Every Morning., Disp: 90 tablet, Rfl: 3  •  enalapril (VASOTEC) 20 MG tablet, Take 1 tablet by mouth Daily., Disp: 30 tablet, Rfl: 5  •  levothyroxine (Synthroid) 50 MCG tablet, Take 1 tablet by mouth Daily., Disp: 90 tablet, Rfl: 1  •  metoprolol succinate XL (Toprol XL) 100 MG 24 hr tablet, Take 1 tablet by mouth Daily., Disp: 30 tablet, Rfl: 5  •  multivitamin with minerals tablet tablet, Take 1 tablet by mouth Daily. Indications: supplement, Disp:  , Rfl:   •  Needle, Disp, 18G X 1-1/2\" misc, Use to draw up testosterone every 14 (fourteen) days., Disp: 2 each, Rfl: 5  •  Omega-3 Fatty Acids (fish oil) 1000 MG capsule capsule, Take 1 capsule by mouth., Disp: , Rfl:   •  Syringe/Needle, Disp, 22G X 1-1/2\" 3 ML misc, Use to inject testosterone every 14 (fourteen) days., Disp: 2 each, Rfl: 5  •  Testosterone Cypionate (DEPOTESTOTERONE CYPIONATE) 200 MG/ML injection, Inject 1 mL into the appropriate muscle as directed by prescriber Every 14 (Fourteen) Days., Disp: 2 mL, Rfl: 3  •  Tirzepatide (Mounjaro) 10 MG/0.5ML solution pen-injector, Inject 10 mg (1 pen) under the skin into the appropriate area as directed 1 (One) Time Per Week., Disp: 2 mL, Rfl: 5  •  Tirzepatide (Mounjaro) 7.5 MG/0.5ML solution pen-injector, Inject 7.5 mg (1 pen) under the skin into the appropriate area as directed 1 (One) Time Per Week., Disp: 2 mL, Rfl: 0    WBC   Date Value Ref Range Status   10/05/2022 3.59 3.40 - 10.80 10*3/mm3 Final     RBC   Date Value Ref Range Status   10/05/2022 4.73 4.14 - 5.80 10*6/mm3 Final     Hemoglobin   Date Value Ref Range Status   10/05/2022 13.7 13.0 - 17.7 g/dL Final     Hematocrit   Date Value Ref Range Status   10/05/2022 39.8 37.5 - 51.0 % Final     MCV   Date Value Ref Range Status   10/05/2022 84.1 79.0 - 97.0 fL Final     MCH   Date Value Ref Range Status   10/05/2022 " 29.0 26.6 - 33.0 pg Final     MCHC   Date Value Ref Range Status   10/05/2022 34.4 31.5 - 35.7 g/dL Final     RDW   Date Value Ref Range Status   10/05/2022 13.8 12.3 - 15.4 % Final     RDW-SD   Date Value Ref Range Status   10/05/2022 41.9 37.0 - 54.0 fl Final     MPV   Date Value Ref Range Status   10/05/2022 9.6 6.0 - 12.0 fL Final     Platelets   Date Value Ref Range Status   10/05/2022 226 140 - 450 10*3/mm3 Final     Neutrophil %   Date Value Ref Range Status   10/05/2022 44.1 42.7 - 76.0 % Final     Lymphocyte %   Date Value Ref Range Status   10/05/2022 44.0 19.6 - 45.3 % Final     Monocyte %   Date Value Ref Range Status   10/05/2022 7.2 5.0 - 12.0 % Final     Eosinophil %   Date Value Ref Range Status   10/05/2022 3.6 0.3 - 6.2 % Final     Basophil %   Date Value Ref Range Status   10/05/2022 1.1 0.0 - 1.5 % Final     Immature Grans %   Date Value Ref Range Status   05/07/2022 0.4 0.0 - 0.5 % Final     Neutrophils, Absolute   Date Value Ref Range Status   10/05/2022 1.58 (L) 1.70 - 7.00 10*3/mm3 Final     Lymphocytes, Absolute   Date Value Ref Range Status   10/05/2022 1.58 0.70 - 3.10 10*3/mm3 Final     Monocytes, Absolute   Date Value Ref Range Status   10/05/2022 0.26 0.10 - 0.90 10*3/mm3 Final     Eosinophils, Absolute   Date Value Ref Range Status   10/05/2022 0.13 0.00 - 0.40 10*3/mm3 Final     Basophils, Absolute   Date Value Ref Range Status   10/05/2022 0.04 0.00 - 0.20 10*3/mm3 Final     Immature Grans, Absolute   Date Value Ref Range Status   05/07/2022 0.04 0.00 - 0.05 10*3/mm3 Final     nRBC   Date Value Ref Range Status   05/07/2022 0.0 0.0 - 0.2 /100 WBC Final       Lab Results   Component Value Date    GLUCOSE 322 (H) 10/05/2022    BUN 9 10/05/2022    CREATININE 0.75 10/05/2022    EGFRIFAFRI 105 09/01/2021    BCR 12.0 10/05/2022    K 3.9 10/05/2022    CO2 25.0 10/05/2022    CALCIUM 9.2 10/05/2022    ALBUMIN 4.40 10/05/2022    AST 24 10/05/2022    ALT 33 10/05/2022       Assessment and  Plan:    1. Obstructive sleep apnea - Established, not controlled  1. Non-compliant with PAP therapy - advised patient to resume using PAP therapy regularly  2. Continue PAP as prescribed  3. Script for PAP supplies  4. Pertinent labs were reviewed as listed above  5. Return to clinic in 6 months with compliance report unless change in symptoms in interim period  2. Insomnia - sleep onset and or maintenance - Established, stable (1)   1. Continue Xanax 1 mg QHS per PCP  2. Patient reportedly started back on Seroquel - I have advised him that he should not be taking this at the same time a Saphris if he is also still taking this medication  3. Follow up with Psych and PCP  3. Morbid obesity - BMI 37.8 - stable chronic illness      I spent 20 minutes caring for Kuldip on this date of service. This time includes time spent by me in the following activities: preparing for the visit, reviewing tests, obtaining and/or reviewing a separately obtained history, performing a medically appropriate examination and/or evaluation , counseling and educating the patient/family/caregiver, documenting information in the medical record and care coordination; discussing PAP therapy, PAP compliance and PAP maintenance    RTC in 6 months. Patient agrees to return sooner if changes in symptoms.        This document has been electronically signed by SARIKA Gagnon on November 21, 2022 14:03 CST          CC: Aura Carrillo MD          No ref. provider found

## 2022-11-27 DIAGNOSIS — E11.43 TYPE 2 DIABETES MELLITUS WITH DIABETIC AUTONOMIC NEUROPATHY, WITHOUT LONG-TERM CURRENT USE OF INSULIN: ICD-10-CM

## 2022-11-28 RX ORDER — TIRZEPATIDE 7.5 MG/.5ML
7.5 INJECTION, SOLUTION SUBCUTANEOUS WEEKLY
Qty: 2 ML | Refills: 0 | Status: SHIPPED | OUTPATIENT
Start: 2022-11-28 | End: 2023-01-09

## 2022-12-07 ENCOUNTER — OFFICE VISIT (OUTPATIENT)
Dept: FAMILY MEDICINE CLINIC | Facility: CLINIC | Age: 47
End: 2022-12-07

## 2022-12-07 VITALS
TEMPERATURE: 97.1 F | HEART RATE: 77 BPM | WEIGHT: 282.4 LBS | BODY MASS INDEX: 38.25 KG/M2 | SYSTOLIC BLOOD PRESSURE: 128 MMHG | RESPIRATION RATE: 16 BRPM | DIASTOLIC BLOOD PRESSURE: 78 MMHG | HEIGHT: 72 IN | OXYGEN SATURATION: 99 %

## 2022-12-07 DIAGNOSIS — E66.9 OBESITY, CLASS II, BMI 35-39.9: ICD-10-CM

## 2022-12-07 DIAGNOSIS — E11.43 TYPE 2 DIABETES MELLITUS WITH DIABETIC AUTONOMIC NEUROPATHY, WITHOUT LONG-TERM CURRENT USE OF INSULIN: Primary | ICD-10-CM

## 2022-12-07 DIAGNOSIS — F31.77 BIPOLAR 1 DISORDER, MIXED, PARTIAL REMISSION: ICD-10-CM

## 2022-12-07 DIAGNOSIS — G47.00 INSOMNIA, UNSPECIFIED TYPE: ICD-10-CM

## 2022-12-07 PROCEDURE — 99214 OFFICE O/P EST MOD 30 MIN: CPT | Performed by: FAMILY MEDICINE

## 2022-12-07 RX ORDER — QUETIAPINE FUMARATE 50 MG/1
50 TABLET, FILM COATED ORAL NIGHTLY
Qty: 30 TABLET | Refills: 5 | Status: SHIPPED | OUTPATIENT
Start: 2022-12-07 | End: 2023-01-25 | Stop reason: DRUGHIGH

## 2022-12-07 RX ORDER — ALPRAZOLAM 1 MG/1
2 TABLET ORAL NIGHTLY PRN
Qty: 60 TABLET | Refills: 2 | Status: SHIPPED | OUTPATIENT
Start: 2022-12-07 | End: 2023-03-14 | Stop reason: SDUPTHER

## 2022-12-07 NOTE — PROGRESS NOTES
Subjective   Kuldip Castaneda is a 47 y.o. male.  With type 2 diabetes, bipolar disorder, vitamin B and D deficiency, low testosterone, hypertension here today following up on diabetes, having trouble sleeping, and bipolar mood symptoms.    He reports his blood sugars are doing well.  He is on the Mounjaro is continue to lose weight.  He is very pleased with this and his blood sugars are coming down.  He says his most recent one fasting was 119 and that he is been running between 110 and 119 most of the time.  He is trying to do better with his diet.    He is concerned about difficulty sleeping.  He does have bipolar and has had some hospitalizations with manic issues in the past.  He says that his worst flareups have usually been when he is unable to sleep for days at a time.  He went back on some Seroquel that he had at home and said that the combination of this with some Xanax helps on those nights.  He says that he does not need both medications every night but on those bad nights he would like to be able to have 2 mg of Xanax and the Seroquel so that he can rest.  He feels like he is doing better with the racing thoughts overall during the day.  He is not currently feeling like he is having any hallucinations or delusional thoughts    Compliance with medications is %.     The following portions of the patient's history were reviewed and updated as appropriate: allergies, current medications, past family history, past medical history, past social history, past surgical history and problem list.    Review of Systems   Constitutional: Negative.    HENT: Negative for sneezing and trouble swallowing.    Eyes: Negative for visual disturbance.   Respiratory: Negative for chest tightness and wheezing.    Cardiovascular: Negative for leg swelling.   Gastrointestinal: Positive for abdominal pain.   Genitourinary: Negative for urgency.   Musculoskeletal: Negative for back pain.   Neurological: Negative.   "  Psychiatric/Behavioral: Positive for hallucinations. The patient is nervous/anxious.             All other systems reviewed and are negative.      Objective    Vitals:    12/07/22 0746   BP: 128/78   BP Location: Left arm   Patient Position: Sitting   Cuff Size: Adult   Pulse: 77   Resp: 16   Temp: 97.1 °F (36.2 °C)   SpO2: 99%   Weight: 128 kg (282 lb 6.4 oz)   Height: 182.9 cm (72.01\")   PainSc: 0-No pain     Body mass index is 38.29 kg/m².    Physical Exam   Constitutional: He appears well-developed. He is cooperative. He does not appear ill.   HENT:   Head: Normocephalic and atraumatic.   Nose: Nose normal.   Eyes: Pupils are equal, round, and reactive to light. Conjunctivae are normal. Right eye exhibits no discharge. Left eye exhibits no discharge. No scleral icterus.   Neck: Trachea normal and phonation normal. No thyromegaly present.       Cardiovascular: Normal rate, regular rhythm and normal heart sounds. Exam reveals no gallop and no friction rub.   No murmur heard.  Pulmonary/Chest: Effort normal and breath sounds normal. No respiratory distress. He has no wheezes. He has no rales.   Abdominal: Soft. Normal appearance and bowel sounds are normal. He exhibits no distension. There is no abdominal tenderness. There is no rebound and no guarding.   Musculoskeletal: Normal range of motion.      Lumbar back: Pain: chronic, rates pain a 5.     Vascular Status -  His right foot exhibits abnormal foot vasculature . His right foot exhibits no edema. His left foot exhibits abnormal foot vasculature . His left foot exhibits no edema.  Lymphadenopathy:     He has no cervical adenopathy.   Neurological: No cranial nerve deficit.   Skin: Skin is warm and dry.   Psychiatric: His behavior is normal. Mood, judgment and thought content normal.   Patient seems clear minded, is very reasonable, alert.    Nursing note and vitals reviewed.      Assessment & Plan   Diagnoses and all orders for this visit:    1. Type 2 " diabetes mellitus with diabetic autonomic neuropathy, without long-term current use of insulin (HCC) (Primary)  -     Hemoglobin A1c  -     Comprehensive Metabolic Panel  -     CBC & Differential; Future    2. Insomnia, unspecified type  -     ALPRAZolam (XANAX) 1 MG tablet; Take 2 tablets by mouth At Night As Needed for Anxiety or Sleep.  Dispense: 60 tablet; Refill: 2    3. Bipolar 1 disorder, mixed, partial remission (HCC)  -     QUEtiapine (SEROquel) 50 MG tablet; Take 1 tablet by mouth Every Night.  Dispense: 30 tablet; Refill: 5    4. Obesity, Class II, BMI 35-39.9    Whether we will continue on the [place] [facility] we will get an A1c along with other labs next visit, as above continue to test blood sugar daily and as needed.    Will go up on the Seroquel to 50 mg at bedtime.  Will allow up to 2 mg of Xanax to take on the nights when he is having difficulty sleeping so that this may help prevent sleep deprivation which he feels like triggers manic episodes for him.  We will continue to follow closely and have him check back with me in 1 month or sooner for problem    Patient's (Body mass index is 38.29 kg/m².) indicates that they are obese (BMI >30) with health related conditions that include hypertension, diabetes mellitus and dyslipidemias . Weight is improving with lifestyle modifications. BMI is is above average; BMI management plan is completed. We discussed portion control and increasing exercise.   We will recheck testosterone levels with next labs as well      This document has been electronically signed by Aura Carrillo MD on December 7, 2022 08:08 CST

## 2022-12-08 DIAGNOSIS — I10 ESSENTIAL HYPERTENSION: ICD-10-CM

## 2022-12-08 RX ORDER — ENALAPRIL MALEATE 20 MG/1
20 TABLET ORAL DAILY
Qty: 30 TABLET | Refills: 5 | Status: SHIPPED | OUTPATIENT
Start: 2022-12-08

## 2022-12-20 ENCOUNTER — TELEPHONE (OUTPATIENT)
Dept: FAMILY MEDICINE CLINIC | Facility: CLINIC | Age: 47
End: 2022-12-20

## 2022-12-20 NOTE — TELEPHONE ENCOUNTER
He can't get the mounjaro.  It's on backorder.  Asking if there is another medicine he needs to take in its place

## 2022-12-21 NOTE — TELEPHONE ENCOUNTER
Aura Carrillo MD  You 14 hours ago (6:10 PM)    Tell him right now about everything like that is on backorder.  Check back in a week or 2 and we will see

## 2023-01-01 NOTE — NURSING NOTE
Behavior  Anxiety 3/10  Depression 5/10  [facility] Is not reporting at this time  SI  None  HI  None    Intervention:  Education provided about medication.  Medication offered and taken without problems. We talked about him going to his mom's on D/C and he says that is his plan for a while.  Encouraged to be more social with others.        Response: Medication compliant, requests to leave today. He is pleasant, alert/oriented.  Makes no comments of snakes in his stomach at this time.  States it was just a problem with medications.      Plan: Discharge plan for today.  Continue to monitor and provide for needs and safety.   N/A

## 2023-01-05 ENCOUNTER — LAB (OUTPATIENT)
Dept: LAB | Facility: OTHER | Age: 48
End: 2023-01-05
Payer: COMMERCIAL

## 2023-01-05 DIAGNOSIS — E11.43 TYPE 2 DIABETES MELLITUS WITH DIABETIC AUTONOMIC NEUROPATHY, WITHOUT LONG-TERM CURRENT USE OF INSULIN: ICD-10-CM

## 2023-01-05 LAB
ALBUMIN SERPL-MCNC: 4.6 G/DL (ref 3.5–5)
ALBUMIN/GLOB SERPL: 1.3 G/DL (ref 1.1–1.8)
ALP SERPL-CCNC: 61 U/L (ref 38–126)
ALT SERPL W P-5'-P-CCNC: 32 U/L
ANION GAP SERPL CALCULATED.3IONS-SCNC: 9 MMOL/L (ref 5–15)
AST SERPL-CCNC: 25 U/L (ref 17–59)
BASOPHILS # BLD AUTO: 0.02 10*3/MM3 (ref 0–0.2)
BASOPHILS NFR BLD AUTO: 0.5 % (ref 0–1.5)
BILIRUB SERPL-MCNC: 0.5 MG/DL (ref 0.2–1.3)
BUN SERPL-MCNC: 17 MG/DL (ref 7–23)
BUN/CREAT SERPL: 15.5 (ref 7–25)
CALCIUM SPEC-SCNC: 9.5 MG/DL (ref 8.4–10.2)
CHLORIDE SERPL-SCNC: 106 MMOL/L (ref 101–112)
CO2 SERPL-SCNC: 24 MMOL/L (ref 22–30)
CREAT SERPL-MCNC: 1.1 MG/DL (ref 0.7–1.3)
DEPRECATED RDW RBC AUTO: 43 FL (ref 37–54)
EGFRCR SERPLBLD CKD-EPI 2021: 83.3 ML/MIN/1.73
EOSINOPHIL # BLD AUTO: 0.17 10*3/MM3 (ref 0–0.4)
EOSINOPHIL NFR BLD AUTO: 3.8 % (ref 0.3–6.2)
ERYTHROCYTE [DISTWIDTH] IN BLOOD BY AUTOMATED COUNT: 14.2 % (ref 12.3–15.4)
GLOBULIN UR ELPH-MCNC: 3.6 GM/DL (ref 2.3–3.5)
GLUCOSE SERPL-MCNC: 126 MG/DL (ref 70–99)
HCT VFR BLD AUTO: 40.2 % (ref 37.5–51)
HGB BLD-MCNC: 14.2 G/DL (ref 13–17.7)
LYMPHOCYTES # BLD AUTO: 2.01 10*3/MM3 (ref 0.7–3.1)
LYMPHOCYTES NFR BLD AUTO: 45.5 % (ref 19.6–45.3)
MCH RBC QN AUTO: 29.9 PG (ref 26.6–33)
MCHC RBC AUTO-ENTMCNC: 35.3 G/DL (ref 31.5–35.7)
MCV RBC AUTO: 84.6 FL (ref 79–97)
MONOCYTES # BLD AUTO: 0.36 10*3/MM3 (ref 0.1–0.9)
MONOCYTES NFR BLD AUTO: 8.1 % (ref 5–12)
NEUTROPHILS NFR BLD AUTO: 1.86 10*3/MM3 (ref 1.7–7)
NEUTROPHILS NFR BLD AUTO: 42.1 % (ref 42.7–76)
PLATELET # BLD AUTO: 273 10*3/MM3 (ref 140–450)
PMV BLD AUTO: 9.4 FL (ref 6–12)
POTASSIUM SERPL-SCNC: 3.9 MMOL/L (ref 3.4–5)
PROT SERPL-MCNC: 8.2 G/DL (ref 6.3–8.6)
RBC # BLD AUTO: 4.75 10*6/MM3 (ref 4.14–5.8)
SODIUM SERPL-SCNC: 139 MMOL/L (ref 137–145)
WBC NRBC COR # BLD: 4.42 10*3/MM3 (ref 3.4–10.8)

## 2023-01-05 PROCEDURE — 85025 COMPLETE CBC W/AUTO DIFF WBC: CPT | Performed by: FAMILY MEDICINE

## 2023-01-05 PROCEDURE — 83036 HEMOGLOBIN GLYCOSYLATED A1C: CPT | Performed by: FAMILY MEDICINE

## 2023-01-05 PROCEDURE — 36415 COLL VENOUS BLD VENIPUNCTURE: CPT | Performed by: FAMILY MEDICINE

## 2023-01-05 PROCEDURE — 80053 COMPREHEN METABOLIC PANEL: CPT | Performed by: FAMILY MEDICINE

## 2023-01-06 LAB — HBA1C MFR BLD: 9.3 % (ref 4.8–5.6)

## 2023-01-09 ENCOUNTER — OFFICE VISIT (OUTPATIENT)
Dept: FAMILY MEDICINE CLINIC | Facility: CLINIC | Age: 48
End: 2023-01-09
Payer: COMMERCIAL

## 2023-01-09 VITALS
OXYGEN SATURATION: 99 % | TEMPERATURE: 97.1 F | WEIGHT: 282 LBS | SYSTOLIC BLOOD PRESSURE: 142 MMHG | HEIGHT: 72 IN | BODY MASS INDEX: 38.19 KG/M2 | DIASTOLIC BLOOD PRESSURE: 90 MMHG | HEART RATE: 77 BPM

## 2023-01-09 DIAGNOSIS — E66.9 OBESITY, CLASS II, BMI 35-39.9: ICD-10-CM

## 2023-01-09 DIAGNOSIS — F31.77 BIPOLAR 1 DISORDER, MIXED, PARTIAL REMISSION: ICD-10-CM

## 2023-01-09 DIAGNOSIS — I10 ESSENTIAL HYPERTENSION: ICD-10-CM

## 2023-01-09 DIAGNOSIS — E11.43 TYPE 2 DIABETES MELLITUS WITH DIABETIC AUTONOMIC NEUROPATHY, WITHOUT LONG-TERM CURRENT USE OF INSULIN: Primary | ICD-10-CM

## 2023-01-09 DIAGNOSIS — G47.00 INSOMNIA, UNSPECIFIED TYPE: ICD-10-CM

## 2023-01-09 PROCEDURE — 99214 OFFICE O/P EST MOD 30 MIN: CPT | Performed by: FAMILY MEDICINE

## 2023-01-09 RX ORDER — TIRZEPATIDE 10 MG/.5ML
10 INJECTION, SOLUTION SUBCUTANEOUS WEEKLY
Qty: 2 ML | Refills: 5 | Status: SHIPPED | OUTPATIENT
Start: 2023-01-09

## 2023-01-09 NOTE — PROGRESS NOTES
Subjective   Kuldip Castaneda is a 47 y.o. male.  With type 2 diabetes, bipolar disorder, vitamin B and D deficiency, low testosterone, hypertension here today following up on diabetes, insomnia, bipolar mood symptoms.    He still has quite a bit of difficulty getting to sleep.  Sometimes he takes 2 Seroquel's and 2 Xanax is to fall asleep and still does not sleep all night.  He had a sleep study but he does not feel is accurate as he did at home and he wondered about having an inpatient sleep study    He tried the CPAP but this did not seem to help him.    Reports blood sugars have been doing better, was 142 this morning.    Checks blood pressures regularly which have been staying at goal    Compliance with medications is %.     The following portions of the patient's history were reviewed and updated as appropriate: allergies, current medications, past family history, past medical history, past social history, past surgical history and problem list.    Review of Systems   Constitutional: Negative.  Negative for activity change, appetite change, diaphoresis, fatigue, fever and unexpected weight change.   HENT: Negative for congestion, ear pain, hearing loss, sinus pressure, sneezing, sore throat, tinnitus, trouble swallowing and voice change.    Eyes: Negative.  Negative for visual disturbance.   Respiratory: Negative.  Negative for chest tightness and wheezing.    Cardiovascular: Negative.  Negative for leg swelling.   Gastrointestinal: Positive for abdominal pain. Negative for abdominal distention, blood in stool, constipation, diarrhea, nausea and vomiting.   Endocrine: Negative.    Genitourinary: Negative for decreased urine volume, dysuria, frequency, hematuria and urgency.   Musculoskeletal: Negative.  Negative for back pain.   Skin: Negative.    Allergic/Immunologic: Negative.    Neurological: Negative.  Negative for dizziness, tremors, seizures, syncope, speech difficulty, weakness, numbness and  headaches.   Hematological: Negative.    Psychiatric/Behavioral: Positive for hallucinations. Negative for dysphoric mood and sleep disturbance. The patient is nervous/anxious.             All other systems reviewed and are negative.      Objective    Vitals:    01/09/23 0744   BP: 142/90   Pulse: 77   Temp: 97.1 °F (36.2 °C)   SpO2: 99%   Weight: 128 kg (282 lb)   Height: 182.9 cm (72.01\")   PainSc: 0-No pain     Body mass index is 38.24 kg/m².    Physical Exam   Constitutional: He appears well-developed. He is cooperative. He does not appear ill.   HENT:   Head: Normocephalic and atraumatic.   Nose: Nose normal.   Eyes: Pupils are equal, round, and reactive to light. Conjunctivae are normal. Right eye exhibits no discharge. Left eye exhibits no discharge. No scleral icterus.   Neck: Trachea normal and phonation normal. No thyromegaly present.       Cardiovascular: Normal rate, regular rhythm and normal heart sounds. Exam reveals no gallop and no friction rub.   No murmur heard.  Pulmonary/Chest: Effort normal and breath sounds normal. No respiratory distress. He has no wheezes. He has no rales.   Abdominal: Soft. Normal appearance and bowel sounds are normal. He exhibits no distension. There is no abdominal tenderness. There is no rebound and no guarding.   Musculoskeletal: Normal range of motion.      Lumbar back: Pain: chronic, rates pain a 5.     Vascular Status -  His right foot exhibits abnormal foot vasculature . His right foot exhibits no edema. His left foot exhibits abnormal foot vasculature . His left foot exhibits no edema.  Lymphadenopathy:     He has no cervical adenopathy.   Neurological: No cranial nerve deficit.   Skin: Skin is warm and dry.   Psychiatric: His behavior is normal. Mood, judgment and thought content normal.   Patient seems clear minded, is very reasonable, alert.    Nursing note and vitals reviewed.    Lab on 01/05/2023   Component Date Value Ref Range Status   • WBC 01/05/2023 4.42   3.40 - 10.80 10*3/mm3 Final   • RBC 01/05/2023 4.75  4.14 - 5.80 10*6/mm3 Final   • Hemoglobin 01/05/2023 14.2  13.0 - 17.7 g/dL Final   • Hematocrit 01/05/2023 40.2  37.5 - 51.0 % Final   • MCV 01/05/2023 84.6  79.0 - 97.0 fL Final   • MCH 01/05/2023 29.9  26.6 - 33.0 pg Final   • MCHC 01/05/2023 35.3  31.5 - 35.7 g/dL Final   • RDW 01/05/2023 14.2  12.3 - 15.4 % Final   • RDW-SD 01/05/2023 43.0  37.0 - 54.0 fl Final   • MPV 01/05/2023 9.4  6.0 - 12.0 fL Final   • Platelets 01/05/2023 273  140 - 450 10*3/mm3 Final   • Neutrophil % 01/05/2023 42.1 (L)  42.7 - 76.0 % Final   • Lymphocyte % 01/05/2023 45.5 (H)  19.6 - 45.3 % Final   • Monocyte % 01/05/2023 8.1  5.0 - 12.0 % Final   • Eosinophil % 01/05/2023 3.8  0.3 - 6.2 % Final   • Basophil % 01/05/2023 0.5  0.0 - 1.5 % Final   • Neutrophils, Absolute 01/05/2023 1.86  1.70 - 7.00 10*3/mm3 Final   • Lymphocytes, Absolute 01/05/2023 2.01  0.70 - 3.10 10*3/mm3 Final   • Monocytes, Absolute 01/05/2023 0.36  0.10 - 0.90 10*3/mm3 Final   • Eosinophils, Absolute 01/05/2023 0.17  0.00 - 0.40 10*3/mm3 Final   • Basophils, Absolute 01/05/2023 0.02  0.00 - 0.20 10*3/mm3 Final   ]  Assessment & Plan   Diagnoses and all orders for this visit:    1. Type 2 diabetes mellitus with diabetic autonomic neuropathy, without long-term current use of insulin (HCC) (Primary)  -     Tirzepatide (Mounjaro) 10 MG/0.5ML solution pen-injector; Inject 10 mg (1 pen) under the skin into the appropriate area as directed 1 (One) Time Per Week.  Dispense: 2 mL; Refill: 5    2. Insomnia, unspecified type    3. Bipolar 1 disorder, mixed, partial remission (HCC)    4. Essential hypertension    5. Obesity, Class II, BMI 35-39.9    Cautioned patient against taking medications more than prescribed.  Concerned about him taking high dose of Seroquel and Xanax together.  Will check with sleep medicine and see if he is a candidate for another sleep study to help pinpoint the issue with his sleeping  problem.    Continue with current medications for bipolar mood disorder    Continue current medications for hypertension and continue to monitor blood pressures regularly with goal of 130/80 or less    Continue with current diabetes medications and testing of blood sugars at least daily and prn.  Encourage compliance with diabetic diet.  We will increase Mounjaro to the 10 mg strength.    Patient's (Body mass index is 38.24 kg/m².) indicates that they are obese (BMI >30) with health related conditions that include hypertension, diabetes mellitus and dyslipidemias . Weight is improving with lifestyle modifications. BMI is is above average; BMI management plan is completed. We discussed portion control and increasing exercise.   We will recheck testosterone levels with next labs as well      This document has been electronically signed by Aura Carrillo MD on January 9, 2023 07:57 CST

## 2023-01-10 DIAGNOSIS — Z91.14 POOR COMPLIANCE WITH CPAP TREATMENT: Primary | ICD-10-CM

## 2023-01-10 DIAGNOSIS — F51.04 PSYCHOPHYSIOLOGICAL INSOMNIA: ICD-10-CM

## 2023-01-10 DIAGNOSIS — G47.33 OBSTRUCTIVE SLEEP APNEA, ADULT: ICD-10-CM

## 2023-01-10 DIAGNOSIS — G25.81 RESTLESS LEGS SYNDROME (RLS): ICD-10-CM

## 2023-01-10 NOTE — PROGRESS NOTES
Patient still has concern regarding accuracy of home sleep testing performed in June 2022. He feels that it was not an accurate representation of his normal sleep pattern and is uncertain about his sleep apnea diagnosis. I have again explained to Mr. Castaneda that the home study more than likely underestimated the severity of apnea rather than over-estimating. However, he states that he wants to have a new baseline study done in the lab due to significant insomnia and restless legs and would like a full diagnostic workup. I agree that in lab testing could be beneficial to assess RLS/PLMD and any other possible causes of insomnia and poor sleep quality.     I have advised him that I will place orders and arrange an in lab study and follow up, however, approval for this procedure will be up to his insurance company. Patient voices understanding.

## 2023-01-12 ENCOUNTER — DOCUMENTATION (OUTPATIENT)
Dept: SLEEP MEDICINE | Facility: HOSPITAL | Age: 48
End: 2023-01-12
Payer: COMMERCIAL

## 2023-01-12 NOTE — PROGRESS NOTES
Notice of insurance denial of in lab PSG.   Patient underwent home sleep testing in June 2022. No medically necessary reason to repeat diagnostic sleep testing. If patient continues to struggle with CPAP, could consider an in lab PAP titration study. Patient is not interested at this time and is agreeable to cancel study and continue trying CPAP.     I see him in ~4 months and have advised him to call me if he needs anything any sooner.

## 2023-03-14 DIAGNOSIS — G47.00 INSOMNIA, UNSPECIFIED TYPE: ICD-10-CM

## 2023-03-14 RX ORDER — ALPRAZOLAM 1 MG/1
2 TABLET ORAL NIGHTLY PRN
Qty: 60 TABLET | Refills: 2 | Status: SHIPPED | OUTPATIENT
Start: 2023-03-14

## 2023-04-06 DIAGNOSIS — E03.9 ACQUIRED HYPOTHYROIDISM: ICD-10-CM

## 2023-04-06 RX ORDER — LEVOTHYROXINE SODIUM 0.05 MG/1
50 TABLET ORAL DAILY
Qty: 90 TABLET | Refills: 1 | Status: SHIPPED | OUTPATIENT
Start: 2023-04-06

## 2023-04-10 ENCOUNTER — LAB (OUTPATIENT)
Dept: LAB | Facility: OTHER | Age: 48
End: 2023-04-10
Payer: COMMERCIAL

## 2023-04-10 ENCOUNTER — OFFICE VISIT (OUTPATIENT)
Dept: FAMILY MEDICINE CLINIC | Facility: CLINIC | Age: 48
End: 2023-04-10
Payer: COMMERCIAL

## 2023-04-10 VITALS
SYSTOLIC BLOOD PRESSURE: 138 MMHG | TEMPERATURE: 98 F | HEIGHT: 72 IN | DIASTOLIC BLOOD PRESSURE: 84 MMHG | BODY MASS INDEX: 36.44 KG/M2 | OXYGEN SATURATION: 98 % | WEIGHT: 269 LBS | HEART RATE: 97 BPM

## 2023-04-10 DIAGNOSIS — E11.43 TYPE 2 DIABETES MELLITUS WITH DIABETIC AUTONOMIC NEUROPATHY, WITHOUT LONG-TERM CURRENT USE OF INSULIN: ICD-10-CM

## 2023-04-10 DIAGNOSIS — G47.00 INSOMNIA, UNSPECIFIED TYPE: Primary | ICD-10-CM

## 2023-04-10 DIAGNOSIS — E66.9 OBESITY, CLASS II, BMI 35-39.9: ICD-10-CM

## 2023-04-10 LAB
ALBUMIN SERPL-MCNC: 4.7 G/DL (ref 3.5–5)
ALBUMIN/GLOB SERPL: 1.3 G/DL (ref 1.1–1.8)
ALP SERPL-CCNC: 62 U/L (ref 38–126)
ALT SERPL W P-5'-P-CCNC: 28 U/L
ANION GAP SERPL CALCULATED.3IONS-SCNC: 11 MMOL/L (ref 5–15)
ARTICHOKE IGE QN: 81 MG/DL (ref 0–100)
AST SERPL-CCNC: 28 U/L (ref 17–59)
BILIRUB SERPL-MCNC: 0.4 MG/DL (ref 0.2–1.3)
BUN SERPL-MCNC: 19 MG/DL (ref 7–23)
BUN/CREAT SERPL: 15.2 (ref 7–25)
CALCIUM SPEC-SCNC: 9.3 MG/DL (ref 8.4–10.2)
CHLORIDE SERPL-SCNC: 104 MMOL/L (ref 101–112)
CO2 SERPL-SCNC: 25 MMOL/L (ref 22–30)
CREAT SERPL-MCNC: 1.25 MG/DL (ref 0.7–1.3)
EGFRCR SERPLBLD CKD-EPI 2021: 71.5 ML/MIN/1.73
GLOBULIN UR ELPH-MCNC: 3.6 GM/DL (ref 2.3–3.5)
GLUCOSE SERPL-MCNC: 119 MG/DL (ref 70–99)
HBA1C MFR BLD: 6.6 % (ref 4.8–5.6)
POTASSIUM SERPL-SCNC: 3.7 MMOL/L (ref 3.4–5)
PROT SERPL-MCNC: 8.3 G/DL (ref 6.3–8.6)
SODIUM SERPL-SCNC: 140 MMOL/L (ref 137–145)

## 2023-04-10 PROCEDURE — 83721 ASSAY OF BLOOD LIPOPROTEIN: CPT | Performed by: FAMILY MEDICINE

## 2023-04-10 PROCEDURE — 80053 COMPREHEN METABOLIC PANEL: CPT | Performed by: FAMILY MEDICINE

## 2023-04-10 PROCEDURE — 36415 COLL VENOUS BLD VENIPUNCTURE: CPT | Performed by: FAMILY MEDICINE

## 2023-04-10 PROCEDURE — 83036 HEMOGLOBIN GLYCOSYLATED A1C: CPT | Performed by: FAMILY MEDICINE

## 2023-04-10 PROCEDURE — 85025 COMPLETE CBC W/AUTO DIFF WBC: CPT | Performed by: FAMILY MEDICINE

## 2023-04-10 RX ORDER — ZOLPIDEM TARTRATE 10 MG/1
10 TABLET ORAL NIGHTLY PRN
Qty: 30 TABLET | Refills: 2 | Status: SHIPPED | OUTPATIENT
Start: 2023-04-10

## 2023-04-10 NOTE — PROGRESS NOTES
Subjective   Kuldip Castaneda is a 47 y.o. male.  With type 2 diabetes, bipolar disorder, vitamin B and D deficiency, low testosterone, hypertension here today following up on these issues and some difficulty sleeping.  He has been on Xanax for a long time.  He says he takes 2 at a time for a total of 2 mg at bedtime and is still not sleeping well.  He sees a mental health provider who also has him on Seroquel and he feels this helps.  He is also on Saphris for bipolar disorder.  No recent psychotic episodes with the bipolar.  He is pleased with the Saphris.    He feels like he is having trouble just slowing down at night and falling asleep.  Sometimes he struggles for hours trying to fall asleep.    Very pleased with the Mounjaro.  Blood sugars are coming down nicely and he is still losing weight.  The following portions of the patient's history were reviewed and updated as appropriate: allergies, current medications, past family history, past medical history, past social history, past surgical history and problem list.    Review of Systems   Constitutional: Negative.  Negative for activity change, appetite change, diaphoresis, fatigue, fever and unexpected weight change.   HENT: Negative for congestion, ear pain, hearing loss, sinus pressure, sneezing, sore throat, tinnitus, trouble swallowing and voice change.    Eyes: Negative.  Negative for visual disturbance.   Respiratory: Negative.  Negative for chest tightness and wheezing.    Cardiovascular: Negative.  Negative for leg swelling.   Gastrointestinal: Positive for abdominal pain. Negative for abdominal distention, blood in stool, constipation, diarrhea, nausea and vomiting.   Endocrine: Negative.    Genitourinary: Negative for decreased urine volume, dysuria, frequency, hematuria and urgency.   Musculoskeletal: Negative.  Negative for back pain.   Skin: Negative.    Allergic/Immunologic: Negative.    Neurological: Negative.  Negative for dizziness, tremors,  "seizures, syncope, speech difficulty, weakness, numbness and headaches.   Hematological: Negative.    Psychiatric/Behavioral: Positive for hallucinations. Negative for dysphoric mood and sleep disturbance. The patient is nervous/anxious.             All other systems reviewed and are negative.      Objective    Vitals:    04/10/23 0754   BP: 138/84   Pulse: 97   Temp: 98 °F (36.7 °C)   SpO2: 98%   Weight: 122 kg (269 lb)   Height: 182.9 cm (72.01\")   PainSc: 0-No pain     Body mass index is 36.47 kg/m².    Physical Exam   Constitutional: He appears well-developed. He is cooperative. He does not appear ill.   HENT:   Head: Normocephalic and atraumatic.   Nose: Nose normal.   Eyes: Pupils are equal, round, and reactive to light. Conjunctivae are normal. Right eye exhibits no discharge. Left eye exhibits no discharge. No scleral icterus.   Neck: Trachea normal and phonation normal. No thyromegaly present.       Cardiovascular: Normal rate, regular rhythm and normal heart sounds. Exam reveals no gallop and no friction rub.   No murmur heard.  Pulmonary/Chest: Effort normal and breath sounds normal. No respiratory distress. He has no wheezes. He has no rales.   Abdominal: Soft. Normal appearance and bowel sounds are normal. He exhibits no distension. There is no abdominal tenderness. There is no rebound and no guarding.   Musculoskeletal: Normal range of motion.      Lumbar back: Pain: chronic, rates pain a 5.     Vascular Status -  His right foot exhibits abnormal foot vasculature . His right foot exhibits no edema. His left foot exhibits abnormal foot vasculature . His left foot exhibits no edema.  Lymphadenopathy:     He has no cervical adenopathy.   Neurological: No cranial nerve deficit.   Skin: Skin is warm and dry.   Psychiatric: His behavior is normal. Mood, judgment and thought content normal.   Patient seems clear minded, is very reasonable, alert.    Nursing note and vitals reviewed.      Assessment & Plan "   Diagnoses and all orders for this visit:    1. Insomnia, unspecified type (Primary)  -     zolpidem (AMBIEN) 10 MG tablet; Take 1 tablet by mouth At Night As Needed for Sleep.  Dispense: 30 tablet; Refill: 2    2. Type 2 diabetes mellitus with diabetic autonomic neuropathy, without long-term current use of insulin  -     Comprehensive Metabolic Panel  -     Hemoglobin A1c  -     CBC & Differential; Future  -     LDL Cholesterol, Direct; Future    3. Obesity, Class II, BMI 35-39.9    The patient has read and signed the [facility] Controlled Substance Contract.  I will continue to see patient for regular follow up appointments. Patient is well controlled on the medication.  MARY has been reviewed by me and is updated every 3 months. The patient is aware of the potential for addiction and dependence.     Recommend against a higher dose of the Xanax especially given that he takes it all at once.  For now we will continue on the Xanax but will add Ambien for insomnia.  Ultimately would like to wean him down off the Xanax.  He has already weaned down considerably and we discussed this today.    Continue with current diabetes medications and testing of blood sugars at least daily and prn.  Encourage compliance with diabetic diet.      Patient's (Body mass index is 36.47 kg/m².) indicates that they are obese (BMI >30) with health related conditions that include hypertension, diabetes mellitus and dyslipidemias . Weight is improving with lifestyle modifications. BMI is is above average; BMI management plan is completed. We discussed portion control and increasing exercise.   We will recheck testosterone levels with next labs as well      This document has been electronically signed by Aura Carrillo MD on April 10, 2023 08:15 CDT

## 2023-04-11 LAB
BASOPHILS # BLD AUTO: 0.04 10*3/MM3 (ref 0–0.2)
BASOPHILS NFR BLD AUTO: 0.9 % (ref 0–1.5)
DEPRECATED RDW RBC AUTO: 48.9 FL (ref 37–54)
EOSINOPHIL # BLD AUTO: 0.25 10*3/MM3 (ref 0–0.4)
EOSINOPHIL NFR BLD AUTO: 5.5 % (ref 0.3–6.2)
ERYTHROCYTE [DISTWIDTH] IN BLOOD BY AUTOMATED COUNT: 15.1 % (ref 12.3–15.4)
HCT VFR BLD AUTO: 43.2 % (ref 37.5–51)
HGB BLD-MCNC: 14.7 G/DL (ref 13–17.7)
IMM GRANULOCYTES # BLD AUTO: 0.01 10*3/MM3 (ref 0–0.05)
IMM GRANULOCYTES NFR BLD AUTO: 0.2 % (ref 0–0.5)
LYMPHOCYTES # BLD AUTO: 2.29 10*3/MM3 (ref 0.7–3.1)
LYMPHOCYTES NFR BLD AUTO: 50.4 % (ref 19.6–45.3)
MCH RBC QN AUTO: 29.9 PG (ref 26.6–33)
MCHC RBC AUTO-ENTMCNC: 34 G/DL (ref 31.5–35.7)
MCV RBC AUTO: 88 FL (ref 79–97)
MONOCYTES # BLD AUTO: 0.33 10*3/MM3 (ref 0.1–0.9)
MONOCYTES NFR BLD AUTO: 7.3 % (ref 5–12)
NEUTROPHILS NFR BLD AUTO: 1.62 10*3/MM3 (ref 1.7–7)
NEUTROPHILS NFR BLD AUTO: 35.7 % (ref 42.7–76)
NRBC BLD AUTO-RTO: 0 /100 WBC (ref 0–0.2)
PLATELET # BLD AUTO: 297 10*3/MM3 (ref 140–450)
PMV BLD AUTO: 10.3 FL (ref 6–12)
RBC # BLD AUTO: 4.91 10*6/MM3 (ref 4.14–5.8)
WBC NRBC COR # BLD: 4.54 10*3/MM3 (ref 3.4–10.8)

## 2023-04-12 NOTE — PROGRESS NOTES
Notify patient test results are ok overall.  His hemoglobin A1c is 6.6.  Tell him great work.  This is a great improvement

## 2023-05-09 ENCOUNTER — OFFICE VISIT (OUTPATIENT)
Dept: FAMILY MEDICINE CLINIC | Facility: CLINIC | Age: 48
End: 2023-05-09
Payer: COMMERCIAL

## 2023-05-09 VITALS
HEIGHT: 72 IN | OXYGEN SATURATION: 99 % | TEMPERATURE: 97.4 F | WEIGHT: 273 LBS | SYSTOLIC BLOOD PRESSURE: 110 MMHG | BODY MASS INDEX: 36.98 KG/M2 | HEART RATE: 88 BPM | DIASTOLIC BLOOD PRESSURE: 70 MMHG

## 2023-05-09 DIAGNOSIS — E11.43 TYPE 2 DIABETES MELLITUS WITH DIABETIC AUTONOMIC NEUROPATHY, WITHOUT LONG-TERM CURRENT USE OF INSULIN: Primary | ICD-10-CM

## 2023-05-09 DIAGNOSIS — E66.9 OBESITY, CLASS II, BMI 35-39.9: ICD-10-CM

## 2023-05-09 DIAGNOSIS — I10 ESSENTIAL HYPERTENSION: ICD-10-CM

## 2023-05-09 DIAGNOSIS — G47.00 INSOMNIA, UNSPECIFIED TYPE: ICD-10-CM

## 2023-05-09 DIAGNOSIS — F31.77 BIPOLAR 1 DISORDER, MIXED, PARTIAL REMISSION: ICD-10-CM

## 2023-05-09 NOTE — PROGRESS NOTES
Subjective   Kuldip Castaneda is a 47 y.o. male.  With type 2 diabetes, bipolar disorder, vitamin B and D deficiency, low testosterone, hypertension following up today on these issues and for refills.    Continuing to lose weight, tolerates the Mounjaro very well.  Blood sugars and blood pressure both improving on this and overall he feels better.  A1c has come down to 6, and was high as 14 a few months ago.    Blood pressure is now staying at goal with current meds    Continues atorvastatin for lipids.    Continues on Xanax when needed for anxiety, Ambien for insomnia.    The following portions of the patient's history were reviewed and updated as appropriate: allergies, current medications, past family history, past medical history, past social history, past surgical history and problem list.    Review of Systems   Constitutional: Negative.  Negative for activity change, appetite change, diaphoresis, fatigue, fever and unexpected weight change.   HENT: Negative for congestion, ear pain, hearing loss, sinus pressure, sneezing, sore throat, tinnitus, trouble swallowing and voice change.    Eyes: Negative.  Negative for visual disturbance.   Respiratory: Negative.  Negative for chest tightness and wheezing.    Cardiovascular: Negative.  Negative for leg swelling.   Gastrointestinal: Positive for abdominal pain. Negative for abdominal distention, blood in stool, constipation, diarrhea, nausea and vomiting.   Endocrine: Negative.    Genitourinary: Negative for decreased urine volume, dysuria, frequency, hematuria and urgency.   Musculoskeletal: Negative.  Negative for back pain.   Skin: Negative.    Allergic/Immunologic: Negative.    Neurological: Negative.  Negative for dizziness, tremors, seizures, syncope, speech difficulty, weakness, numbness and headaches.   Hematological: Negative.    Psychiatric/Behavioral: Positive for hallucinations. Negative for dysphoric mood and sleep disturbance. The patient is  "nervous/anxious.             All other systems reviewed and are negative.      Objective    Vitals:    05/09/23 0825   BP: 110/70   Pulse: 88   Temp: 97.4 °F (36.3 °C)   SpO2: 99%   Weight: 124 kg (273 lb)   Height: 182.9 cm (72.01\")   PainSc: 0-No pain     Body mass index is 37.02 kg/m².    Physical Exam   Constitutional: He appears well-developed. He is cooperative. He does not appear ill.   HENT:   Head: Normocephalic and atraumatic.   Nose: Nose normal.   Eyes: Pupils are equal, round, and reactive to light. Conjunctivae are normal. Right eye exhibits no discharge. Left eye exhibits no discharge. No scleral icterus.   Neck: Trachea normal and phonation normal. No thyromegaly present.       Cardiovascular: Normal rate, regular rhythm and normal heart sounds. Exam reveals no gallop and no friction rub.   No murmur heard.  Pulmonary/Chest: Effort normal and breath sounds normal. No respiratory distress. He has no wheezes. He has no rales.   Abdominal: Soft. Normal appearance and bowel sounds are normal. He exhibits no distension. There is no abdominal tenderness. There is no rebound and no guarding.   Musculoskeletal: Normal range of motion.      Lumbar back: Pain: chronic, rates pain a 5.     Vascular Status -  His right foot exhibits abnormal foot vasculature . His right foot exhibits no edema. His left foot exhibits abnormal foot vasculature . His left foot exhibits no edema.  Lymphadenopathy:     He has no cervical adenopathy.   Neurological: No cranial nerve deficit.   Skin: Skin is warm and dry.   Psychiatric: His behavior is normal. Mood, judgment and thought content normal.   Patient seems clear minded, is very reasonable, alert.    Nursing note and vitals reviewed.    Lab on 04/10/2023   Component Date Value Ref Range Status   • LDL Cholesterol  04/10/2023 81  0 - 100 mg/dL Final   • WBC 04/10/2023 4.54  3.40 - 10.80 10*3/mm3 Final   • RBC 04/10/2023 4.91  4.14 - 5.80 10*6/mm3 Final   • Hemoglobin " 04/10/2023 14.7  13.0 - 17.7 g/dL Final   • Hematocrit 04/10/2023 43.2  37.5 - 51.0 % Final   • MCV 04/10/2023 88.0  79.0 - 97.0 fL Final   • MCH 04/10/2023 29.9  26.6 - 33.0 pg Final   • MCHC 04/10/2023 34.0  31.5 - 35.7 g/dL Final   • RDW 04/10/2023 15.1  12.3 - 15.4 % Final   • RDW-SD 04/10/2023 48.9  37.0 - 54.0 fl Final   • MPV 04/10/2023 10.3  6.0 - 12.0 fL Final   • Platelets 04/10/2023 297  140 - 450 10*3/mm3 Final   • Neutrophil % 04/10/2023 35.7 (L)  42.7 - 76.0 % Final   • Lymphocyte % 04/10/2023 50.4 (H)  19.6 - 45.3 % Final   • Monocyte % 04/10/2023 7.3  5.0 - 12.0 % Final   • Eosinophil % 04/10/2023 5.5  0.3 - 6.2 % Final   • Basophil % 04/10/2023 0.9  0.0 - 1.5 % Final   • Immature Grans % 04/10/2023 0.2  0.0 - 0.5 % Final   • Neutrophils, Absolute 04/10/2023 1.62 (L)  1.70 - 7.00 10*3/mm3 Final   • Lymphocytes, Absolute 04/10/2023 2.29  0.70 - 3.10 10*3/mm3 Final   • Monocytes, Absolute 04/10/2023 0.33  0.10 - 0.90 10*3/mm3 Final   • Eosinophils, Absolute 04/10/2023 0.25  0.00 - 0.40 10*3/mm3 Final   • Basophils, Absolute 04/10/2023 0.04  0.00 - 0.20 10*3/mm3 Final   • Immature Grans, Absolute 04/10/2023 0.01  0.00 - 0.05 10*3/mm3 Final   • nRBC 04/10/2023 0.0  0.0 - 0.2 /100 WBC Final   Office Visit on 04/10/2023   Component Date Value Ref Range Status   • Glucose 04/10/2023 119 (H)  70 - 99 mg/dL Final   • BUN 04/10/2023 19  7 - 23 mg/dL Final   • Creatinine 04/10/2023 1.25  0.70 - 1.30 mg/dL Final   • Sodium 04/10/2023 140  137 - 145 mmol/L Final   • Potassium 04/10/2023 3.7  3.4 - 5.0 mmol/L Final   • Chloride 04/10/2023 104  101 - 112 mmol/L Final   • CO2 04/10/2023 25.0  22.0 - 30.0 mmol/L Final   • Calcium 04/10/2023 9.3  8.4 - 10.2 mg/dL Final   • Total Protein 04/10/2023 8.3  6.3 - 8.6 g/dL Final   • Albumin 04/10/2023 4.7  3.5 - 5.0 g/dL Final   • ALT (SGPT) 04/10/2023 28  <=50 U/L Final   • AST (SGOT) 04/10/2023 28  17 - 59 U/L Final   • Alkaline Phosphatase 04/10/2023 62  38 - 126 U/L  Final   • Total Bilirubin 04/10/2023 0.4  0.2 - 1.3 mg/dL Final   • Globulin 04/10/2023 3.6 (H)  2.3 - 3.5 gm/dL Final   • A/G Ratio 04/10/2023 1.3  1.1 - 1.8 g/dL Final   • BUN/Creatinine Ratio 04/10/2023 15.2  7.0 - 25.0 Final   • Anion Gap 04/10/2023 11.0  5.0 - 15.0 mmol/L Final   • eGFR 04/10/2023 71.5  >60.0 mL/min/1.73 Final   • Hemoglobin A1C 04/10/2023 6.60 (H)  4.80 - 5.60 % Final       Assessment & Plan   Diagnoses and all orders for this visit:    1. Type 2 diabetes mellitus with diabetic autonomic neuropathy, without long-term current use of insulin (Primary)    2. Obesity, Class II, BMI 35-39.9    3. Essential hypertension    The patient has read and signed the [facility] Controlled Substance Contract.  I will continue to see patient for regular follow up appointments. Patient is well controlled on the medication.  MARY has been reviewed by me and is updated every 3 months. The patient is aware of the potential for addiction and dependence.     Continue current medications for hypertension and continue to monitor blood pressures regularly with goal of 130/80 or less    Continue atorvastatin for lipids.    Reviewed recent labs with him today, as above.    Continue Xanax only as needed for severe anxiety related to bipolar anxiety and depression.    Continue Ambien for insomnia, contact me when refills are needed    Continue with current diabetes medications including Mounjaro and testing of blood sugars at least daily and prn.  Encourage compliance with diabetic diet.      Patient's (Body mass index is 37.02 kg/m².) indicates that they are obese (BMI >30) with health related conditions that include hypertension, diabetes mellitus and dyslipidemias . Weight is improving with lifestyle modifications. BMI is is above average; BMI management plan is completed. We discussed portion control and increasing exercise.         This document has been electronically signed by Aura Carrillo MD on May 9,  2023 08:53 CDT

## 2023-05-24 DIAGNOSIS — I10 ESSENTIAL HYPERTENSION: ICD-10-CM

## 2023-05-24 RX ORDER — CLONIDINE HYDROCHLORIDE 0.1 MG/1
TABLET ORAL
Qty: 90 TABLET | Refills: 3 | Status: SHIPPED | OUTPATIENT
Start: 2023-05-24

## 2023-06-06 ENCOUNTER — OFFICE VISIT (OUTPATIENT)
Dept: SLEEP MEDICINE | Facility: CLINIC | Age: 48
End: 2023-06-06
Payer: COMMERCIAL

## 2023-06-06 VITALS
BODY MASS INDEX: 37.93 KG/M2 | OXYGEN SATURATION: 95 % | SYSTOLIC BLOOD PRESSURE: 96 MMHG | WEIGHT: 280 LBS | DIASTOLIC BLOOD PRESSURE: 60 MMHG | HEART RATE: 83 BPM | HEIGHT: 72 IN

## 2023-06-06 DIAGNOSIS — G47.33 OBSTRUCTIVE SLEEP APNEA: Primary | ICD-10-CM

## 2023-06-06 DIAGNOSIS — F51.04 PSYCHOPHYSIOLOGICAL INSOMNIA: ICD-10-CM

## 2023-06-06 DIAGNOSIS — E66.01 MORBID OBESITY: ICD-10-CM

## 2023-06-06 NOTE — PROGRESS NOTES
Sleep Clinic Follow Up    Date: 6/6/2023  Primary Care Provider: Aura Carrillo MD    Last office visit: 11/21/2022 (I reviewed this note)    CC: Follow up: NAVA      Interim History:  Since the last visit:    1) mild NAVA -  Kuldip Castaneda has not remained compliant with CPAP. He has not used his CPAP in ~6 months with no specified reason other than poor sleep at night. He has recently had changes in his medication regimen and is now taking Seroquel and Ambien. This is helping to improve his sleep quality, but he is still not interested in restarting CPAP usage at this time. We discussed risk of untreated NAVA, and patient verbalizes understanding. I have previously recommended positional therapy, good control of seasonal allergis, and weight loss as more conservative treatment options.     2) Patient denies RLS symptoms.     Sleep Testing:    HST on 06/07/2022, AHI of 5.6   Currently on 5-20 cm H2O    PAP Data:    No new data available  Mask type: Nasal pillows  DME: [employer]  Machine type: 3B [employer] II    Bed time: 2030 (falls asleep on couch and goes to bed ~0200)  Sleep latency: 30-40 minutes  Number of times awakens during the night: 2  Wake time: 6535-4750  Estimated total sleep time at night: 4-5 hours  Caffeine intake: 1-2 cups of coffee, 0 cups of tea, and 0-1 sodas per day  Alcohol intake: 0 drinks per week  Nap time: 1-2 times per week   Sleepiness with Driving: none     [place] - 12  [place] Sleepiness Scale  Sitting and reading: High chance of dozing  Watching TV: High chance of dozing  Sitting, inactive in a public place (e.g. a theatre or a meeting): Would never doze  As a passenger in a car for an hour without a break: Slight chance of dozing  Lying down to rest in the afternoon when circumstances permit: High chance of dozing  Sitting and talking to someone: Would never doze  Sitting quietly after a lunch without alcohol: Moderate chance of dozing  In a car, while stopped for a few minutes  in traffic: Would never doze  Total score: 12    PMHx, FH, SH reviewed and pertinent changes are: Medication changes.     Review of Systems:   Negative for chest pain, SOA, fever, chills, cough, N/V/D, abdominal pain.    Smoking:none    Kuldipbret Castaneda  reports that he has quit smoking. He has never used smokeless tobacco.    Physical Exam:  Vitals:    06/06/23 1300   BP: 96/60   Pulse: 83   SpO2: 95%           06/06/23  1300   Weight: 127 kg (280 lb)     Body mass index is 37.97 kg/m².   Obesity related comorbidities include: obstructive sleep apnea,  hypertension, hyperlipidemia, diabetes mellitus, and GERD. Recommend follow up with PCP, discussed increased exercise and portion control.      Gen:                No distress, conversant, pleasant, appears stated age, alert, oriented  Eyes:               Anicteric sclera, moist conjunctiva, no lid lag                           PERRL, EOMI   Heent:             NC/AT                          Oropharynx clear                          Normal hearing  Lungs:             Normal effort, non-labored breathing        CV:                  Normal S1/S2                          No lower extremity edema  ABD:               Soft, rounded, non-distended             Psych:             Appropriate affect  Neuro:             CN 2-12 appear intact    Past Medical History:   Diagnosis Date    Anxiety     Biliary dyskinesia     Blood in feces          Chest pain     Chronic cholecystitis without calculus     postoperative       Depressive disorder     Epigastric pain     Essential hypertension     Gastro-esophageal reflux disease with esophagitis     Generalized anxiety disorder     Genital warts     large left groin       Glaucoma suspect     suspicious disc cupping       Hashimoto's thyroiditis     Hematochezia     History of echocardiogram 01/15/2016    Mild concentric LV hypertrophy with normal left atrial and aortic root size. LV systolic function is overall well preserved with EF  55-60%. Mitral valve mildly thickened with adequate opening.    Hypercholesterolemia     Hyperlipemia     Hypertensive disorder     Lipoma of anterior chest wall     left    Major depressive disorder     Microscopic hematuria     Morbid obesity     Multiple acquired skin tags     in groin    Nausea and vomiting     Obesity     Pain in pelvis     Painful urging to urinate     Panic disorder     Psychiatric illness     Psychosis     Right upper quadrant pain     Schizoaffective disorder     Transient visual loss     resolved, prob BS elevation       Type 2 diabetes mellitus     not on medications at this time     Visual disturbance     Vitamin D deficiency        Current Outpatient Medications:     ALPRAZolam (XANAX) 1 MG tablet, Take 2 tablets by mouth At Night As Needed for Anxiety or Sleep., Disp: 60 tablet, Rfl: 2    amLODIPine (NORVASC) 10 MG tablet, Take 1 tablet by mouth Daily., Disp: 90 tablet, Rfl: 3    asenapine maleate (Saphris) 10 MG sublingual tablet sublingual tablet, Place one (1) tablet under the tongue at bedtime AND one (1) tablet once a day as needed for anxiety, irritability, agitation, and restless., Disp: 60 tablet, Rfl: 5    asenapine maleate (Saphris) 10 MG sublingual tablet sublingual tablet, Place one (1) tablet under the tongue at bedtime AND one (1) tablet once a day as needed for anxiety, irritability, agitation, and restless., Disp: 60 tablet, Rfl: 5    atorvastatin (LIPITOR) 20 MG tablet, Take 1 tablet by mouth Every Night., Disp: 90 tablet, Rfl: 2    cloNIDine (CATAPRES) 0.1 MG tablet, Take 1 tablet by mouth three times daily., Disp: 90 tablet, Rfl: 3    empagliflozin (JARDIANCE) 25 MG tablet tablet, Take 1 tablet by mouth Every Morning., Disp: 90 tablet, Rfl: 3    enalapril (VASOTEC) 20 MG tablet, Take 1 tablet by mouth Daily., Disp: 30 tablet, Rfl: 5    levothyroxine (Synthroid) 50 MCG tablet, Take 1 tablet by mouth Daily., Disp: 90 tablet, Rfl: 1    metoprolol succinate XL (Toprol  "XL) 100 MG 24 hr tablet, Take 1 tablet by mouth Daily., Disp: 30 tablet, Rfl: 5    multivitamin with minerals tablet tablet, Take 1 tablet by mouth Daily. Indications: supplement, Disp:  , Rfl:     Needle, Disp, 18G X 1-1/2\" misc, Use to draw up testosterone every 14 (fourteen) days., Disp: 2 each, Rfl: 5    Omega-3 Fatty Acids (fish oil) 1000 MG capsule capsule, Take 1 capsule by mouth. Indications: Thickening and Hardening of Heart Arteries, Disp: , Rfl:     QUEtiapine (SEROquel) 100 MG tablet, Take one (1) to two (2) tablets by mouth at bedtime as needed for insomnia, Disp: 60 tablet, Rfl: 5    QUEtiapine (SEROquel) 100 MG tablet, Take one (1) to two (2) tablets by mouth at bedtime as needed for insomnia., Disp: 60 tablet, Rfl: 5    Syringe/Needle, Disp, 22G X 1-1/2\" 3 ML misc, Use to inject testosterone every 14 (fourteen) days., Disp: 2 each, Rfl: 5    Testosterone Cypionate (DEPOTESTOTERONE CYPIONATE) 200 MG/ML injection, Inject 1 mL into the appropriate muscle as directed by prescriber Every 14 (Fourteen) Days., Disp: 2 mL, Rfl: 3    Tirzepatide (Mounjaro) 10 MG/0.5ML solution pen-injector, Inject 10 mg (1 pen) under the skin into the appropriate area as directed 1 (One) Time Per Week., Disp: 2 mL, Rfl: 5    zolpidem (AMBIEN) 10 MG tablet, Take 1 tablet by mouth At Night As Needed for Sleep., Disp: 30 tablet, Rfl: 2    WBC   Date Value Ref Range Status   04/10/2023 4.54 3.40 - 10.80 10*3/mm3 Final     RBC   Date Value Ref Range Status   04/10/2023 4.91 4.14 - 5.80 10*6/mm3 Final     Hemoglobin   Date Value Ref Range Status   04/10/2023 14.7 13.0 - 17.7 g/dL Final     Hematocrit   Date Value Ref Range Status   04/10/2023 43.2 37.5 - 51.0 % Final     MCV   Date Value Ref Range Status   04/10/2023 88.0 79.0 - 97.0 fL Final     MCH   Date Value Ref Range Status   04/10/2023 29.9 26.6 - 33.0 pg Final     MCHC   Date Value Ref Range Status   04/10/2023 34.0 31.5 - 35.7 g/dL Final     RDW   Date Value Ref Range " Status   04/10/2023 15.1 12.3 - 15.4 % Final     RDW-SD   Date Value Ref Range Status   04/10/2023 48.9 37.0 - 54.0 fl Final     MPV   Date Value Ref Range Status   04/10/2023 10.3 6.0 - 12.0 fL Final     Platelets   Date Value Ref Range Status   04/10/2023 297 140 - 450 10*3/mm3 Final     Neutrophil %   Date Value Ref Range Status   04/10/2023 35.7 (L) 42.7 - 76.0 % Final     Lymphocyte %   Date Value Ref Range Status   04/10/2023 50.4 (H) 19.6 - 45.3 % Final     Monocyte %   Date Value Ref Range Status   04/10/2023 7.3 5.0 - 12.0 % Final     Eosinophil %   Date Value Ref Range Status   04/10/2023 5.5 0.3 - 6.2 % Final     Basophil %   Date Value Ref Range Status   04/10/2023 0.9 0.0 - 1.5 % Final     Immature Grans %   Date Value Ref Range Status   04/10/2023 0.2 0.0 - 0.5 % Final     Neutrophils, Absolute   Date Value Ref Range Status   04/10/2023 1.62 (L) 1.70 - 7.00 10*3/mm3 Final     Lymphocytes, Absolute   Date Value Ref Range Status   04/10/2023 2.29 0.70 - 3.10 10*3/mm3 Final     Monocytes, Absolute   Date Value Ref Range Status   04/10/2023 0.33 0.10 - 0.90 10*3/mm3 Final     Eosinophils, Absolute   Date Value Ref Range Status   04/10/2023 0.25 0.00 - 0.40 10*3/mm3 Final     Basophils, Absolute   Date Value Ref Range Status   04/10/2023 0.04 0.00 - 0.20 10*3/mm3 Final     Immature Grans, Absolute   Date Value Ref Range Status   04/10/2023 0.01 0.00 - 0.05 10*3/mm3 Final     nRBC   Date Value Ref Range Status   04/10/2023 0.0 0.0 - 0.2 /100 WBC Final       Lab Results   Component Value Date    GLUCOSE 119 (H) 04/10/2023    BUN 19 04/10/2023    CREATININE 1.25 04/10/2023    EGFR 71.5 04/10/2023    BCR 15.2 04/10/2023    K 3.7 04/10/2023    CO2 25.0 04/10/2023    CALCIUM 9.3 04/10/2023    ALBUMIN 4.7 04/10/2023    BILITOT 0.4 04/10/2023    AST 28 04/10/2023    ALT 28 04/10/2023       Assessment and Plan:    Obstructive sleep apnea - Established, not controlled  Nom-compliant with PAP therapy, uninterested in  continued use at this time  Recommend positional therapy, weight loss, and good control of seasonal allergies  Advised patient to follow up with me if he does wish to pursue further treatment or resume using CPAP as recommended  Pertinent labs were reviewed as listed above  Return to clinic in 1 year PRN unless change in symptoms in interim period  2. Insomnia - sleep onset and or maintenance - Established, stable (1)   1.   Managed by PCP  - Ambien and Seroquel  Morbid obesity - BMI 38 - stable chronic illness      I spent 15 minutes caring for Kuldip on this date of service. This time includes time spent by me in the following activities: preparing for the visit, reviewing tests, obtaining and/or reviewing a separately obtained history, performing a medically appropriate examination and/or evaluation , counseling and educating the patient/family/caregiver, documenting information in the medical record, and care coordination; discussing PAP therapy    RTC in 12 months PRN. Patient agrees to return sooner if changes in symptoms.        This document has been electronically signed by SARIKA Gagnon on June 6, 2023 13:09 CDT          CC: Aura Carrillo MD          No ref. provider found

## 2023-06-07 ENCOUNTER — TELEPHONE (OUTPATIENT)
Dept: FAMILY MEDICINE CLINIC | Facility: CLINIC | Age: 48
End: 2023-06-07
Payer: COMMERCIAL

## 2023-06-07 NOTE — TELEPHONE ENCOUNTER
His bp has been running low.  Its running around 90/60.  He is taking amlodipine, clonidine and enalapril.  Wondering if his medication needs to be adjusted

## 2023-06-07 NOTE — TELEPHONE ENCOUNTER
Aura Carrillo MD  You9 minutes ago (12:57 PM)      Tell him to leave off the clonidine but did not throw it away in case his blood pressure goes back up

## 2023-06-10 DIAGNOSIS — I10 ESSENTIAL HYPERTENSION: ICD-10-CM

## 2023-06-11 RX ORDER — ENALAPRIL MALEATE 20 MG/1
20 TABLET ORAL DAILY
Qty: 30 TABLET | Refills: 5 | Status: SHIPPED | OUTPATIENT
Start: 2023-06-11

## 2023-06-19 DIAGNOSIS — G47.00 INSOMNIA, UNSPECIFIED TYPE: ICD-10-CM

## 2023-06-19 RX ORDER — ALPRAZOLAM 1 MG/1
2 TABLET ORAL NIGHTLY PRN
Qty: 60 TABLET | Refills: 2 | Status: SHIPPED | OUTPATIENT
Start: 2023-06-19

## 2023-07-25 ENCOUNTER — TELEPHONE (OUTPATIENT)
Dept: FAMILY MEDICINE CLINIC | Facility: CLINIC | Age: 48
End: 2023-07-25
Payer: COMMERCIAL

## 2023-07-25 RX ORDER — ATORVASTATIN CALCIUM 20 MG/1
20 TABLET, FILM COATED ORAL NIGHTLY
Qty: 90 TABLET | Refills: 2 | Status: SHIPPED | OUTPATIENT
Start: 2023-07-25

## 2023-08-09 ENCOUNTER — OFFICE VISIT (OUTPATIENT)
Dept: FAMILY MEDICINE CLINIC | Facility: CLINIC | Age: 48
End: 2023-08-09
Payer: COMMERCIAL

## 2023-08-09 VITALS
HEIGHT: 72 IN | WEIGHT: 265 LBS | SYSTOLIC BLOOD PRESSURE: 142 MMHG | HEART RATE: 101 BPM | BODY MASS INDEX: 35.89 KG/M2 | DIASTOLIC BLOOD PRESSURE: 98 MMHG | OXYGEN SATURATION: 99 % | TEMPERATURE: 97.8 F

## 2023-08-09 DIAGNOSIS — I10 ESSENTIAL HYPERTENSION: ICD-10-CM

## 2023-08-09 DIAGNOSIS — E66.9 OBESITY, CLASS II, BMI 35-39.9: ICD-10-CM

## 2023-08-09 DIAGNOSIS — E03.9 ACQUIRED HYPOTHYROIDISM: ICD-10-CM

## 2023-08-09 DIAGNOSIS — G47.00 INSOMNIA, UNSPECIFIED TYPE: ICD-10-CM

## 2023-08-09 DIAGNOSIS — E55.9 HYPOVITAMINOSIS D: ICD-10-CM

## 2023-08-09 DIAGNOSIS — F31.77 BIPOLAR 1 DISORDER, MIXED, PARTIAL REMISSION: ICD-10-CM

## 2023-08-09 DIAGNOSIS — E78.5 HYPERLIPIDEMIA, UNSPECIFIED HYPERLIPIDEMIA TYPE: ICD-10-CM

## 2023-08-09 DIAGNOSIS — E53.8 B12 DEFICIENCY: ICD-10-CM

## 2023-08-09 DIAGNOSIS — E11.43 TYPE 2 DIABETES MELLITUS WITH DIABETIC AUTONOMIC NEUROPATHY, WITHOUT LONG-TERM CURRENT USE OF INSULIN: Primary | ICD-10-CM

## 2023-08-09 PROCEDURE — 99214 OFFICE O/P EST MOD 30 MIN: CPT | Performed by: FAMILY MEDICINE

## 2023-08-09 NOTE — PROGRESS NOTES
Subjective   Kuldip Castaneda is a 47 y.o. male.  With type 2 diabetes, bipolar disorder, vitamin B and D deficiency, low testosterone, hypertension following up today on these issues.    He is feels his current regimen for bipolar is working well for him.  He does have Xanax to take as needed for panic attacks and does not take it every day.    He continues on Ambien for chronic insomnia.  This has worked well for him and he is very compliant with disease.    His blood sugars are doing well at home.  He is continue to have some weight loss, with efforts and has been trying to comply with diabetic diet.  He has tolerated the Mounjaro well.  His A1c is down to 6.6 last time, this came down from 14 earlier within the past year.    Blood pressure has been at goal for the most part although it is elevated today.  He admits that he does not check it very often.    The following portions of the patient's history were reviewed and updated as appropriate: allergies, current medications, past family history, past medical history, past social history, past surgical history and problem list.    Review of Systems   Constitutional: Negative.  Negative for activity change, appetite change, diaphoresis, fatigue, fever and unexpected weight change.   HENT:  Negative for congestion, ear pain, hearing loss, sinus pressure, sneezing, sore throat, tinnitus, trouble swallowing and voice change.    Eyes: Negative.  Negative for visual disturbance.   Respiratory: Negative.  Negative for chest tightness and wheezing.    Cardiovascular: Negative.  Negative for leg swelling.   Gastrointestinal:  Positive for abdominal pain. Negative for abdominal distention, blood in stool, constipation, diarrhea, nausea and vomiting.   Endocrine: Negative.    Genitourinary:  Negative for decreased urine volume, dysuria, frequency, hematuria and urgency.   Musculoskeletal: Negative.  Negative for back pain.   Skin: Negative.    Allergic/Immunologic:  "Negative.    Neurological: Negative.  Negative for dizziness, tremors, seizures, syncope, speech difficulty, weakness, numbness and headaches.   Hematological: Negative.    Psychiatric/Behavioral:  Positive for hallucinations. Negative for dysphoric mood and sleep disturbance. The patient is nervous/anxious.             All other systems reviewed and are negative.    Objective    Vitals:    08/09/23 0948   BP: 142/98  Comment: hadn't had BP meds this morning   Pulse: 101   Temp: 97.8 øF (36.6 øC)   SpO2: 99%   Weight: 127 kg (280 lb)   Height: 182.9 cm (72.01\")   PainSc: 0-No pain     Body mass index is 37.96 kg/mý.    Physical Exam   Constitutional: He appears well-developed. He is cooperative. He does not appear ill.   HENT:   Head: Normocephalic and atraumatic.   Nose: Nose normal.   Eyes: Pupils are equal, round, and reactive to light. Conjunctivae are normal. Right eye exhibits no discharge. Left eye exhibits no discharge. No scleral icterus.   Neck: Trachea normal and phonation normal. No thyromegaly present.       Cardiovascular: Normal rate, regular rhythm and normal heart sounds. Exam reveals no gallop and no friction rub.   No murmur heard.  Pulmonary/Chest: Effort normal and breath sounds normal. No respiratory distress. He has no wheezes. He has no rales.   Abdominal: Soft. Normal appearance and bowel sounds are normal. He exhibits no distension. There is no abdominal tenderness. There is no rebound and no guarding.   Musculoskeletal: Normal range of motion.     Vascular Status -  His right foot exhibits abnormal foot vasculature . His right foot exhibits no edema. His left foot exhibits abnormal foot vasculature . His left foot exhibits no edema.  Lymphadenopathy:     He has no cervical adenopathy.   Neurological: No cranial nerve deficit.   Skin: Skin is warm and dry.   Psychiatric: His behavior is normal. Mood, judgment and thought content normal.   Patient seems clear minded, is very reasonable, " alert.    Nursing note and vitals reviewed.      Assessment & Plan   Diagnoses and all orders for this visit:    1. Type 2 diabetes mellitus with diabetic autonomic neuropathy, without long-term current use of insulin (Primary)  -     Comprehensive Metabolic Panel  -     Hemoglobin A1c    2. Bipolar 1 disorder, mixed, partial remission    3. Obesity, Class II, BMI 35-39.9    4. Essential hypertension  -     CBC & Differential; Future    5. Insomnia, unspecified type    6. Acquired hypothyroidism  -     TSH  -     T4, Free    7. Hyperlipidemia, unspecified hyperlipidemia type  -     Lipid Panel; Future    8. Hypovitaminosis D  -     Vitamin D,25-Hydroxy    9. B12 deficiency  -     Vitamin B12 & Folate      The patient has read and signed the [facility] Controlled Substance Contract.  I will continue to see patient for regular follow up appointments. Patient is well controlled on the medication.  MARY has been reviewed by me and is updated every 3 months. The patient is aware of the potential for addiction and dependence.     Continue Ambien for insomnia and reassess in 3 months    Continue Xanax as needed for panic attacks, and again encouraged to take infrequently and only if needed.    Continue current medications for hypertension and continue to monitor blood pressures regularly with goal of 130/80 or less    Continue atorvastatin for hyperlipidemia, will get labs next visit.    Continue with current diabetes medications including Mounjaro and testing of blood sugars at least daily and prn.  Encourage compliance with diabetic diet.      Patient's (Body mass index is 37.96 kg/mý.) indicates that they are obese (BMI >30) with health related conditions that include hypertension, diabetes mellitus and dyslipidemias . Weight is improving with lifestyle modifications. BMI is is above average; BMI management plan is completed. We discussed portion control and increasing exercise.         This document has been  electronically signed by Aura Carrillo MD on August 9, 2023 10:08 CDT

## 2023-09-14 DIAGNOSIS — G47.00 INSOMNIA, UNSPECIFIED TYPE: ICD-10-CM

## 2023-09-14 RX ORDER — ALPRAZOLAM 1 MG/1
2 TABLET ORAL NIGHTLY PRN
Qty: 60 TABLET | Refills: 2 | Status: SHIPPED | OUTPATIENT
Start: 2023-09-14

## 2024-09-01 NOTE — PLAN OF CARE
Problem: Patient Care Overview (Adult)  Goal: Plan of Care Review  Outcome: Ongoing (interventions implemented as appropriate)    07/12/17 1610   Coping/Psychosocial Response Interventions   Plan Of Care Reviewed With patient;spouse   Patient Care Overview   Progress no change         Problem: Acute Coronary Syndrome (ACS) (Adult)  Goal: Signs and Symptoms of Listed Potential Problems Will be Absent or Manageable (Acute Coronary Syndrome)  Outcome: Ongoing (interventions implemented as appropriate)       Chest Pain    Chest pain can be caused by many different conditions which may or may not be dangerous. Causes include heartburn, lung infections, heart attack, blood clot in lungs, skin infections, strain or damage to muscle, cartilage, or bones, etc. Lab tests or other studies including an electrocardiogram (EKG) may have been performed to find the cause of your pain. Make sure to follow up with a cardiologist or as instructed by your health care professional.    SEEK IMMEDIATE MEDICAL CARE IF YOU HAVE THE FOLLOWING SYMPTOMS: worsening chest pain, coughing up blood, unexplained back/neck/jaw pain, severe abdominal pain, dizziness or lightheadedness, shortness of breath, sweaty or clammy skin, vomiting, or racing heart beat. These symptoms may represent a serious problem that is an emergency. Do not wait to see if the symptoms will go away. Get medical help right away. Call your local emergency services (911 in the [place] Do not drive yourself to the hospital.      Headache    A headache is pain or discomfort felt around the head or neck area. The specific cause of a headache may not be found as there are many types including tension headaches, migraine headaches, and cluster headaches. Watch your condition for any changes. Things you can do to manage your pain include taking over the counter and prescription medications as instructed by your health care provider, lying down in a dark quiet room, limiting stress, getting regular sleep, and refraining from alcohol and tobacco products.    SEEK IMMEDIATE MEDICAL CARE IF YOU EXPERIENCE THE FOLLOWING SYMPTOMS: fever, vomiting, stiff neck, loss of vision, problems with speech, muscle weakness, loss of balance, trouble walking, pass out, or confusion.
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
## (undated) DEVICE — INTRO SHEATH ART/FEM ENGAGE .038 6F12CM

## (undated) DEVICE — MODEL BT2000 P/N 700287-012KIT CONTENTS: MANIFOLD WITH SALINE AND CONTRAST PORTS, SALINE TUBING WITH SPIKE AND HAND SYRINGE, TRANSDUCER: Brand: BT2000 AUTOMATED MANIFOLD KIT

## (undated) DEVICE — A2000 MULTI-USE SYRINGE KIT, P/N 701277-003KIT CONTENTS: 100ML CONTRAST RESERVOIR AND TUBING WITH CONTRAST SPIKE AND CLAMP: Brand: A2000 MULTI-USE SYRINGE KIT

## (undated) DEVICE — CANN SMPL SOFTECH BIFLO ETCO2 A/M 7FT

## (undated) DEVICE — BITEBLOCK ENDO W/STRAP 60F A/ LF DISP

## (undated) DEVICE — ELECTRODE,RT,STRESS,FOAM,50PK: Brand: MEDLINE

## (undated) DEVICE — SINGLE-USE BIOPSY FORCEPS: Brand: RADIAL JAW 4

## (undated) DEVICE — KT INTRO MINISTICK MAX W/GW NITNL/TUNG ECHO 4F 21G 7CM

## (undated) DEVICE — MODEL AT P65, P/N 701554-001KIT CONTENTS: HAND CONTROLLER, 3-WAY HIGH-PRESSURE STOPCOCK WITH ROTATING END AND PREMIUM HIGH-PRESSURE TUBING: Brand: ANGIOTOUCH® KIT

## (undated) DEVICE — GW PERIPH GUIDERIGHT STD/J/TP PTFE/PCOAT SS 0.038IN 5X150CM

## (undated) DEVICE — CATH DIAG EXPO M/ PK 6FR FL4/FR4 PIG 3PK

## (undated) DEVICE — ANGIO-SEAL EVOLUTION VASCULAR CLOSURE DEVICE: Brand: ANGIO-SEAL